# Patient Record
Sex: FEMALE | Race: ASIAN | NOT HISPANIC OR LATINO | Employment: FULL TIME | URBAN - METROPOLITAN AREA
[De-identification: names, ages, dates, MRNs, and addresses within clinical notes are randomized per-mention and may not be internally consistent; named-entity substitution may affect disease eponyms.]

---

## 2017-03-31 ENCOUNTER — APPOINTMENT (OUTPATIENT)
Dept: ULTRASOUND IMAGING | Facility: CLINIC | Age: 37
End: 2017-03-31
Attending: EMERGENCY MEDICINE
Payer: COMMERCIAL

## 2017-03-31 ENCOUNTER — HOSPITAL ENCOUNTER (OUTPATIENT)
Facility: CLINIC | Age: 37
Discharge: HOME | End: 2017-04-01
Attending: EMERGENCY MEDICINE | Admitting: OBSTETRICS & GYNECOLOGY
Payer: COMMERCIAL

## 2017-03-31 ENCOUNTER — ANESTHESIA (OUTPATIENT)
Dept: SURGERY | Facility: CLINIC | Age: 37
End: 2017-03-31
Payer: COMMERCIAL

## 2017-03-31 ENCOUNTER — APPOINTMENT (OUTPATIENT)
Dept: ULTRASOUND IMAGING | Facility: CLINIC | Age: 37
End: 2017-03-31
Attending: OBSTETRICS & GYNECOLOGY
Payer: COMMERCIAL

## 2017-03-31 ENCOUNTER — ANESTHESIA EVENT (OUTPATIENT)
Dept: SURGERY | Facility: CLINIC | Age: 37
End: 2017-03-31
Payer: COMMERCIAL

## 2017-03-31 DIAGNOSIS — Z98.890 S/P DILATION AND CURETTAGE: Primary | ICD-10-CM

## 2017-03-31 LAB
ABO + RH BLD: NORMAL
ABO + RH BLD: NORMAL
ANISOCYTOSIS BLD QL SMEAR: SLIGHT
BASOPHILS # BLD AUTO: 0.1 10E9/L (ref 0–0.2)
BASOPHILS NFR BLD AUTO: 0.6 %
BLD GP AB SCN SERPL QL: NORMAL
BLD PROD TYP BPU: NORMAL
BLD PROD TYP BPU: NORMAL
BLD UNIT ID BPU: 0
BLD UNIT ID BPU: 0
BLOOD BANK CMNT PATIENT-IMP: NORMAL
BLOOD PRODUCT CODE: NORMAL
BLOOD PRODUCT CODE: NORMAL
BPU ID: NORMAL
BPU ID: NORMAL
DIFFERENTIAL METHOD BLD: ABNORMAL
EOSINOPHIL # BLD AUTO: 0.5 10E9/L (ref 0–0.7)
EOSINOPHIL NFR BLD AUTO: 5.1 %
ERYTHROCYTE [DISTWIDTH] IN BLOOD BY AUTOMATED COUNT: 17.5 % (ref 10–15)
HCG SERPL QL: NEGATIVE
HCT VFR BLD AUTO: 29.8 % (ref 35–47)
HGB BLD-MCNC: 10.2 G/DL (ref 11.7–15.7)
HGB BLD-MCNC: 6.9 G/DL (ref 11.7–15.7)
HGB BLD-MCNC: 9.7 G/DL (ref 11.7–15.7)
IMM GRANULOCYTES # BLD: 0 10E9/L (ref 0–0.4)
IMM GRANULOCYTES NFR BLD: 0.4 %
LYMPHOCYTES # BLD AUTO: 1.6 10E9/L (ref 0.8–5.3)
LYMPHOCYTES NFR BLD AUTO: 17.2 %
MCH RBC QN AUTO: 18.7 PG (ref 26.5–33)
MCHC RBC AUTO-ENTMCNC: 32.6 G/DL (ref 31.5–36.5)
MCV RBC AUTO: 57 FL (ref 78–100)
MICROCYTES BLD QL SMEAR: PRESENT
MONOCYTES # BLD AUTO: 0.5 10E9/L (ref 0–1.3)
MONOCYTES NFR BLD AUTO: 5.7 %
NEUTROPHILS # BLD AUTO: 6.6 10E9/L (ref 1.6–8.3)
NEUTROPHILS NFR BLD AUTO: 71 %
NRBC # BLD AUTO: 0 10*3/UL
NRBC BLD AUTO-RTO: 0 /100
NUM BPU REQUESTED: 2
PLATELET # BLD AUTO: 224 10E9/L (ref 150–450)
PLATELET # BLD EST: NORMAL 10*3/UL
RBC # BLD AUTO: 5.19 10E12/L (ref 3.8–5.2)
SPECIMEN EXP DATE BLD: NORMAL
TARGETS BLD QL SMEAR: SLIGHT
TRANSFUSION STATUS PATIENT QL: NORMAL
WBC # BLD AUTO: 9.2 10E9/L (ref 4–11)

## 2017-03-31 PROCEDURE — 00000159 ZZHCL STATISTIC H-SEND OUTS PREP: Performed by: OBSTETRICS & GYNECOLOGY

## 2017-03-31 PROCEDURE — 37000009 ZZH ANESTHESIA TECHNICAL FEE, EACH ADDTL 15 MIN: Performed by: OBSTETRICS & GYNECOLOGY

## 2017-03-31 PROCEDURE — 25000128 H RX IP 250 OP 636: Performed by: EMERGENCY MEDICINE

## 2017-03-31 PROCEDURE — 40000935 ZZH STATISTIC OUTPATIENT (NON-OBS) EVE

## 2017-03-31 PROCEDURE — 36000052 ZZH SURGERY LEVEL 2 EA 15 ADDTL MIN: Performed by: OBSTETRICS & GYNECOLOGY

## 2017-03-31 PROCEDURE — 27210794 ZZH OR GENERAL SUPPLY STERILE: Performed by: OBSTETRICS & GYNECOLOGY

## 2017-03-31 PROCEDURE — 36000050 ZZH SURGERY LEVEL 2 1ST 30 MIN: Performed by: OBSTETRICS & GYNECOLOGY

## 2017-03-31 PROCEDURE — 85018 HEMOGLOBIN: CPT | Performed by: ANESTHESIOLOGY

## 2017-03-31 PROCEDURE — 25000132 ZZH RX MED GY IP 250 OP 250 PS 637: Performed by: OBSTETRICS & GYNECOLOGY

## 2017-03-31 PROCEDURE — 36430 TRANSFUSION BLD/BLD COMPNT: CPT

## 2017-03-31 PROCEDURE — 37000008 ZZH ANESTHESIA TECHNICAL FEE, 1ST 30 MIN: Performed by: OBSTETRICS & GYNECOLOGY

## 2017-03-31 PROCEDURE — 40000306 ZZH STATISTIC PRE PROC ASSESS II: Performed by: OBSTETRICS & GYNECOLOGY

## 2017-03-31 PROCEDURE — 88305 TISSUE EXAM BY PATHOLOGIST: CPT | Mod: 26 | Performed by: OBSTETRICS & GYNECOLOGY

## 2017-03-31 PROCEDURE — 36415 COLL VENOUS BLD VENIPUNCTURE: CPT | Performed by: OBSTETRICS & GYNECOLOGY

## 2017-03-31 PROCEDURE — 71000012 ZZH RECOVERY PHASE 1 LEVEL 1 FIRST HR: Performed by: OBSTETRICS & GYNECOLOGY

## 2017-03-31 PROCEDURE — 99285 EMERGENCY DEPT VISIT HI MDM: CPT | Mod: 25

## 2017-03-31 PROCEDURE — 84703 CHORIONIC GONADOTROPIN ASSAY: CPT | Performed by: EMERGENCY MEDICINE

## 2017-03-31 PROCEDURE — 86901 BLOOD TYPING SEROLOGIC RH(D): CPT | Performed by: OBSTETRICS & GYNECOLOGY

## 2017-03-31 PROCEDURE — 85025 COMPLETE CBC W/AUTO DIFF WBC: CPT | Performed by: EMERGENCY MEDICINE

## 2017-03-31 PROCEDURE — 86900 BLOOD TYPING SEROLOGIC ABO: CPT | Performed by: OBSTETRICS & GYNECOLOGY

## 2017-03-31 PROCEDURE — 96374 THER/PROPH/DIAG INJ IV PUSH: CPT

## 2017-03-31 PROCEDURE — P9016 RBC LEUKOCYTES REDUCED: HCPCS | Performed by: OBSTETRICS & GYNECOLOGY

## 2017-03-31 PROCEDURE — 86923 COMPATIBILITY TEST ELECTRIC: CPT | Performed by: OBSTETRICS & GYNECOLOGY

## 2017-03-31 PROCEDURE — 25800025 ZZH RX 258: Performed by: ANESTHESIOLOGY

## 2017-03-31 PROCEDURE — 96361 HYDRATE IV INFUSION ADD-ON: CPT

## 2017-03-31 PROCEDURE — 85018 HEMOGLOBIN: CPT | Performed by: OBSTETRICS & GYNECOLOGY

## 2017-03-31 PROCEDURE — 40000864 US INTRAOPERATIVE: Mod: TC

## 2017-03-31 PROCEDURE — 96375 TX/PRO/DX INJ NEW DRUG ADDON: CPT

## 2017-03-31 PROCEDURE — 40000936 ZZH STATISTIC OUTPATIENT (NON-OBS) NIGHT

## 2017-03-31 PROCEDURE — 25000125 ZZHC RX 250: Performed by: NURSE ANESTHETIST, CERTIFIED REGISTERED

## 2017-03-31 PROCEDURE — 25000566 ZZH SEVOFLURANE, EA 15 MIN: Performed by: OBSTETRICS & GYNECOLOGY

## 2017-03-31 PROCEDURE — 25000128 H RX IP 250 OP 636: Performed by: NURSE ANESTHETIST, CERTIFIED REGISTERED

## 2017-03-31 PROCEDURE — 76830 TRANSVAGINAL US NON-OB: CPT

## 2017-03-31 PROCEDURE — 25000125 ZZHC RX 250: Performed by: OBSTETRICS & GYNECOLOGY

## 2017-03-31 PROCEDURE — 25000132 ZZH RX MED GY IP 250 OP 250 PS 637: Performed by: NURSE ANESTHETIST, CERTIFIED REGISTERED

## 2017-03-31 PROCEDURE — 86850 RBC ANTIBODY SCREEN: CPT | Performed by: OBSTETRICS & GYNECOLOGY

## 2017-03-31 PROCEDURE — 88305 TISSUE EXAM BY PATHOLOGIST: CPT | Performed by: OBSTETRICS & GYNECOLOGY

## 2017-03-31 PROCEDURE — 25000132 ZZH RX MED GY IP 250 OP 250 PS 637: Performed by: ANESTHESIOLOGY

## 2017-03-31 PROCEDURE — 96409 CHEMO IV PUSH SNGL DRUG: CPT

## 2017-03-31 RX ORDER — HYDROMORPHONE HYDROCHLORIDE 1 MG/ML
.3-.5 INJECTION, SOLUTION INTRAMUSCULAR; INTRAVENOUS; SUBCUTANEOUS EVERY 10 MIN PRN
Status: DISCONTINUED | OUTPATIENT
Start: 2017-03-31 | End: 2017-03-31 | Stop reason: HOSPADM

## 2017-03-31 RX ORDER — PHENAZOPYRIDINE HYDROCHLORIDE 200 MG/1
200 TABLET, FILM COATED ORAL ONCE
Status: DISCONTINUED | OUTPATIENT
Start: 2017-03-31 | End: 2017-04-01 | Stop reason: HOSPADM

## 2017-03-31 RX ORDER — NALOXONE HYDROCHLORIDE 0.4 MG/ML
.1-.4 INJECTION, SOLUTION INTRAMUSCULAR; INTRAVENOUS; SUBCUTANEOUS
Status: DISCONTINUED | OUTPATIENT
Start: 2017-03-31 | End: 2017-03-31 | Stop reason: HOSPADM

## 2017-03-31 RX ORDER — KETOROLAC TROMETHAMINE 30 MG/ML
30 INJECTION, SOLUTION INTRAMUSCULAR; INTRAVENOUS ONCE
Status: DISCONTINUED | OUTPATIENT
Start: 2017-03-31 | End: 2017-03-31

## 2017-03-31 RX ORDER — LABETALOL HYDROCHLORIDE 5 MG/ML
10 INJECTION, SOLUTION INTRAVENOUS EVERY 5 MIN PRN
Status: DISCONTINUED | OUTPATIENT
Start: 2017-03-31 | End: 2017-03-31 | Stop reason: HOSPADM

## 2017-03-31 RX ORDER — SODIUM CHLORIDE 9 MG/ML
1000 INJECTION, SOLUTION INTRAVENOUS CONTINUOUS
Status: DISCONTINUED | OUTPATIENT
Start: 2017-03-31 | End: 2017-03-31

## 2017-03-31 RX ORDER — FENTANYL CITRATE 50 UG/ML
25-50 INJECTION, SOLUTION INTRAMUSCULAR; INTRAVENOUS
Status: DISCONTINUED | OUTPATIENT
Start: 2017-03-31 | End: 2017-03-31 | Stop reason: HOSPADM

## 2017-03-31 RX ORDER — OXYTOCIN 10 [USP'U]/ML
INJECTION, SOLUTION INTRAMUSCULAR; INTRAVENOUS PRN
Status: DISCONTINUED | OUTPATIENT
Start: 2017-03-31 | End: 2017-03-31

## 2017-03-31 RX ORDER — MEPERIDINE HYDROCHLORIDE 25 MG/ML
12.5 INJECTION INTRAMUSCULAR; INTRAVENOUS; SUBCUTANEOUS
Status: DISCONTINUED | OUTPATIENT
Start: 2017-03-31 | End: 2017-03-31 | Stop reason: HOSPADM

## 2017-03-31 RX ORDER — SODIUM CHLORIDE, SODIUM LACTATE, POTASSIUM CHLORIDE, CALCIUM CHLORIDE 600; 310; 30; 20 MG/100ML; MG/100ML; MG/100ML; MG/100ML
INJECTION, SOLUTION INTRAVENOUS CONTINUOUS
Status: DISCONTINUED | OUTPATIENT
Start: 2017-03-31 | End: 2017-03-31 | Stop reason: HOSPADM

## 2017-03-31 RX ORDER — ONDANSETRON 4 MG/1
4 TABLET, ORALLY DISINTEGRATING ORAL EVERY 30 MIN PRN
Status: DISCONTINUED | OUTPATIENT
Start: 2017-03-31 | End: 2017-03-31 | Stop reason: HOSPADM

## 2017-03-31 RX ORDER — ONDANSETRON 2 MG/ML
4 INJECTION INTRAMUSCULAR; INTRAVENOUS EVERY 30 MIN PRN
Status: DISCONTINUED | OUTPATIENT
Start: 2017-03-31 | End: 2017-03-31 | Stop reason: HOSPADM

## 2017-03-31 RX ORDER — LIDOCAINE 40 MG/G
CREAM TOPICAL
Status: DISCONTINUED | OUTPATIENT
Start: 2017-03-31 | End: 2017-03-31 | Stop reason: HOSPADM

## 2017-03-31 RX ORDER — ONDANSETRON 2 MG/ML
4 INJECTION INTRAMUSCULAR; INTRAVENOUS EVERY 6 HOURS PRN
Status: DISCONTINUED | OUTPATIENT
Start: 2017-03-31 | End: 2017-04-01 | Stop reason: HOSPADM

## 2017-03-31 RX ORDER — NALOXONE HYDROCHLORIDE 0.4 MG/ML
.1-.4 INJECTION, SOLUTION INTRAMUSCULAR; INTRAVENOUS; SUBCUTANEOUS
Status: DISCONTINUED | OUTPATIENT
Start: 2017-03-31 | End: 2017-04-01 | Stop reason: HOSPADM

## 2017-03-31 RX ORDER — ACETAMINOPHEN 500 MG
1000 TABLET ORAL ONCE
Status: COMPLETED | OUTPATIENT
Start: 2017-03-31 | End: 2017-03-31

## 2017-03-31 RX ORDER — DOXYCYCLINE 100 MG/10ML
100 INJECTION, POWDER, LYOPHILIZED, FOR SOLUTION INTRAVENOUS
Status: COMPLETED | OUTPATIENT
Start: 2017-03-31 | End: 2017-03-31

## 2017-03-31 RX ORDER — METHYLERGONOVINE MALEATE 0.2 MG/1
TABLET ORAL PRN
Status: DISCONTINUED | OUTPATIENT
Start: 2017-03-31 | End: 2017-03-31

## 2017-03-31 RX ORDER — ACETAMINOPHEN 325 MG/1
650 TABLET ORAL EVERY 6 HOURS PRN
Status: DISCONTINUED | OUTPATIENT
Start: 2017-03-31 | End: 2017-04-01 | Stop reason: HOSPADM

## 2017-03-31 RX ORDER — GLYCOPYRROLATE 0.2 MG/ML
INJECTION, SOLUTION INTRAMUSCULAR; INTRAVENOUS PRN
Status: DISCONTINUED | OUTPATIENT
Start: 2017-03-31 | End: 2017-03-31

## 2017-03-31 RX ORDER — METHYLERGONOVINE MALEATE 0.2 MG/1
200 TABLET ORAL 3 TIMES DAILY
Status: DISCONTINUED | OUTPATIENT
Start: 2017-03-31 | End: 2017-04-01 | Stop reason: HOSPADM

## 2017-03-31 RX ORDER — IBUPROFEN 600 MG/1
600 TABLET, FILM COATED ORAL EVERY 6 HOURS PRN
Status: DISCONTINUED | OUTPATIENT
Start: 2017-03-31 | End: 2017-04-01 | Stop reason: HOSPADM

## 2017-03-31 RX ORDER — KETOROLAC TROMETHAMINE 15 MG/ML
15 INJECTION, SOLUTION INTRAMUSCULAR; INTRAVENOUS ONCE
Status: COMPLETED | OUTPATIENT
Start: 2017-03-31 | End: 2017-03-31

## 2017-03-31 RX ORDER — FENTANYL CITRATE 50 UG/ML
INJECTION, SOLUTION INTRAMUSCULAR; INTRAVENOUS PRN
Status: DISCONTINUED | OUTPATIENT
Start: 2017-03-31 | End: 2017-03-31

## 2017-03-31 RX ORDER — LIDOCAINE HYDROCHLORIDE 10 MG/ML
INJECTION, SOLUTION INFILTRATION; PERINEURAL PRN
Status: DISCONTINUED | OUTPATIENT
Start: 2017-03-31 | End: 2017-03-31

## 2017-03-31 RX ORDER — PROPOFOL 10 MG/ML
INJECTION, EMULSION INTRAVENOUS PRN
Status: DISCONTINUED | OUTPATIENT
Start: 2017-03-31 | End: 2017-03-31

## 2017-03-31 RX ORDER — EPHEDRINE SULFATE 50 MG/ML
INJECTION, SOLUTION INTRAMUSCULAR; INTRAVENOUS; SUBCUTANEOUS PRN
Status: DISCONTINUED | OUTPATIENT
Start: 2017-03-31 | End: 2017-03-31

## 2017-03-31 RX ORDER — ONDANSETRON 4 MG/1
4 TABLET, ORALLY DISINTEGRATING ORAL EVERY 6 HOURS PRN
Status: DISCONTINUED | OUTPATIENT
Start: 2017-03-31 | End: 2017-04-01 | Stop reason: HOSPADM

## 2017-03-31 RX ADMIN — Medication 5 MG: at 14:53

## 2017-03-31 RX ADMIN — Medication 5 MG: at 14:45

## 2017-03-31 RX ADMIN — PROPOFOL 150 MG: 10 INJECTION, EMULSION INTRAVENOUS at 14:29

## 2017-03-31 RX ADMIN — DOXYCYCLINE HYCLATE 100 MG: 100 INJECTION, POWDER, LYOPHILIZED, FOR SOLUTION INTRAVENOUS at 14:25

## 2017-03-31 RX ADMIN — MIDAZOLAM HYDROCHLORIDE 1 MG: 1 INJECTION, SOLUTION INTRAMUSCULAR; INTRAVENOUS at 14:25

## 2017-03-31 RX ADMIN — CONJUGATED ESTROGENS 25 MG: 25 INJECTION, POWDER, LYOPHILIZED, FOR SOLUTION INTRAMUSCULAR; INTRAVENOUS at 12:20

## 2017-03-31 RX ADMIN — PHENYLEPHRINE HYDROCHLORIDE 100 MCG: 10 INJECTION, SOLUTION INTRAMUSCULAR; INTRAVENOUS; SUBCUTANEOUS at 14:53

## 2017-03-31 RX ADMIN — SODIUM CHLORIDE, POTASSIUM CHLORIDE, SODIUM LACTATE AND CALCIUM CHLORIDE: 600; 310; 30; 20 INJECTION, SOLUTION INTRAVENOUS at 15:14

## 2017-03-31 RX ADMIN — PHENYLEPHRINE HYDROCHLORIDE 150 MCG: 10 INJECTION, SOLUTION INTRAMUSCULAR; INTRAVENOUS; SUBCUTANEOUS at 14:49

## 2017-03-31 RX ADMIN — METHYLERGONOVINE MALEATE 200 MCG: 0.2 TABLET ORAL at 14:51

## 2017-03-31 RX ADMIN — METHYLERGONOVINE MALEATE 200 MCG: 0.2 TABLET ORAL at 20:07

## 2017-03-31 RX ADMIN — SODIUM CHLORIDE, POTASSIUM CHLORIDE, SODIUM LACTATE AND CALCIUM CHLORIDE: 600; 310; 30; 20 INJECTION, SOLUTION INTRAVENOUS at 14:25

## 2017-03-31 RX ADMIN — OXYTOCIN 10 UNITS: 10 INJECTION, SOLUTION INTRAMUSCULAR; INTRAVENOUS at 14:55

## 2017-03-31 RX ADMIN — SODIUM CHLORIDE 1000 ML: 9 INJECTION, SOLUTION INTRAVENOUS at 11:40

## 2017-03-31 RX ADMIN — FENTANYL CITRATE 50 MCG: 50 INJECTION, SOLUTION INTRAMUSCULAR; INTRAVENOUS at 14:43

## 2017-03-31 RX ADMIN — PHENYLEPHRINE HYDROCHLORIDE 50 MCG: 10 INJECTION, SOLUTION INTRAMUSCULAR; INTRAVENOUS; SUBCUTANEOUS at 14:45

## 2017-03-31 RX ADMIN — MIDAZOLAM HYDROCHLORIDE 1 MG: 1 INJECTION, SOLUTION INTRAMUSCULAR; INTRAVENOUS at 15:05

## 2017-03-31 RX ADMIN — GLYCOPYRROLATE 0.2 MG: 0.2 INJECTION, SOLUTION INTRAMUSCULAR; INTRAVENOUS at 14:43

## 2017-03-31 RX ADMIN — LIDOCAINE HYDROCHLORIDE 50 MG: 10 INJECTION, SOLUTION INFILTRATION; PERINEURAL at 14:29

## 2017-03-31 RX ADMIN — PHENYLEPHRINE HYDROCHLORIDE 100 MCG: 10 INJECTION, SOLUTION INTRAMUSCULAR; INTRAVENOUS; SUBCUTANEOUS at 14:57

## 2017-03-31 RX ADMIN — PHENYLEPHRINE HYDROCHLORIDE 100 MCG: 10 INJECTION, SOLUTION INTRAMUSCULAR; INTRAVENOUS; SUBCUTANEOUS at 14:54

## 2017-03-31 RX ADMIN — SUCCINYLCHOLINE CHLORIDE 60 MG: 20 INJECTION, SOLUTION INTRAMUSCULAR; INTRAVENOUS at 14:29

## 2017-03-31 RX ADMIN — PHENYLEPHRINE HYDROCHLORIDE 100 MCG: 10 INJECTION, SOLUTION INTRAMUSCULAR; INTRAVENOUS; SUBCUTANEOUS at 15:05

## 2017-03-31 RX ADMIN — KETOROLAC TROMETHAMINE 15 MG: 15 INJECTION, SOLUTION INTRAMUSCULAR; INTRAVENOUS at 11:39

## 2017-03-31 RX ADMIN — PHENYLEPHRINE HYDROCHLORIDE 100 MCG: 10 INJECTION, SOLUTION INTRAMUSCULAR; INTRAVENOUS; SUBCUTANEOUS at 14:50

## 2017-03-31 RX ADMIN — ACETAMINOPHEN 1000 MG: 500 TABLET, FILM COATED ORAL at 13:25

## 2017-03-31 ASSESSMENT — ENCOUNTER SYMPTOMS: LIGHT-HEADEDNESS: 1

## 2017-03-31 NOTE — H&P
GYN Admission Note    HPI:  Pt is a 37 year old No obstetric history on file. with LMP who presented to the ER c/o heavy vaginal bleeding.  Her symptoms began last night and has increased, passing clots and filling a pad in less than an hour..  She was seen in our office in December and had an ultrasound that showed a missed ab/blighted ovum.  At that time she was not bleeding.  She began having light spotting in January which continued until last night when she began bleeding heavily.    OBHx:      GynHx: No significant    Med Hx: History reviewed. No pertinent past medical history.    Surg Hx:  No past surgical history on file.    Meds:  none    Allergies: No Known Allergies    Soc Hx:   Social History     Social History     Marital status: Single     Spouse name: N/A     Number of children: N/A     Years of education: N/A     Occupational History     Not on file.     Social History Main Topics     Smoking status: Not on file     Smokeless tobacco: Not on file     Alcohol use Not on file     Drug use: Not on file     Sexual activity: Not on file     Other Topics Concern     Not on file     Social History Narrative     No narrative on file       Fam Hx: No family history on file.    PE:    VS:  BP (!) 162/93  Pulse 89  Temp 97.4  F (36.3  C) (Oral)  Resp 16  SpO2 100%  Gen:  A&O, NAD  Lungs:  CTAB  CV:  RRR  Abd:  Soft nontender  Pelvic: clots in vagina, cervix closed    Labs:  QUalitative HCG negative, hgb=9.4    Imaging: uterus filled with clots, debris, c/w retained POC, complex cyst ovary    A/P:  37 year old No obstetric history on file. with presumed retained POC  1. Suction D and C now.  Surgical procedure dicussed, Risk and benefits discussed.  2. Type and screen for blood type  3. Followup ovarian cyst as outpatient    Cary Ascencio  3/31/2017  12:47 PM

## 2017-03-31 NOTE — PROGRESS NOTES
ROOM #226    Living Situation (if not independent, order SW consult): with family  Facility name:    Activity level at baseline: Ind  Activity level on admit: SBA    Is patient a falls risk? Yes  Reason for falls risk: Other- post op  Falls armband on? YES  Within Arm's Reach? Yes   Bed alarm turned on?   Not applicable, calling appropriately  Personal alarm in place and turned on?   Not applicable,    Patient registered to observation; given Patient Bill of Rights; given the opportunity to ask questions about observation status and their plan of care.  Patient has been oriented to the observation room, bathroom and call light is in place.    : Olayinka hampton.  See demographics.

## 2017-03-31 NOTE — PLAN OF CARE
Problem: Discharge Planning  Goal: Discharge Planning (Adult, OB, Behavioral, Peds)  PRIMARY DIAGNOSIS/PROCEDURE: D & C, Acute Blood Loss  OUTPATIENT/OBSERVATION GOALS TO BE MET BEFORE DISCHARGE:     1. Stable vital signs Yes  2. Tolerating diet:Yes,full liquid  3. Pain controlled with oral pain medications:  Denies pain  4. Positive bowel sounds:  Yes  5. Voiding without difficulty:  Yes  6. Able to ambulate:  Yes  7. Provider specific discharge goals met:  No       A&O x 4.  Denies pain.  Received 2 units PRBC.  Recheck hgb at 6.9.  Denies dizziness/lightheadedness.  Up with SBA.  Voiding appropriately.  Scant drainage on peripad.  Tolerating full liquid diet.  VSS on RA.

## 2017-03-31 NOTE — BRIEF OP NOTE
[facility] Brief Operative Note    Pre-operative diagnosis: incomplete AB   Post-operative diagnosis same   Procedure: Procedure(s):  DILATION AND CURETTAGE SUCTION with ultrasound guidance  - Wound Class: II-Clean Contaminated   Surgeon(s): Surgeon(s) and Role:     * Cary Ascencio MD - Assiting     * Lise Stack MD - Primary   Estimated blood loss: 300 mL intraop, 300cc clot and blood in underwear upon arrival to OR    Specimens:   ID Type Source Tests Collected by Time Destination   A : Products of Conception  Products of Conception Placenta/ Fragments/ Fetus from Miscarriage or Termination SURGICAL PATHOLOGY EXAM Cary Ascencio MD 3/31/2017  3:21 PM       Findings: 8wk size uterus, initial return of small white calcific fragments and dark black tissue followed by brisk uterine bleeding. IV pitocin and IM methergine given with resolution. U/S guidance called to OR and cavity still appeared full of tissue/clot. Sharp and suction curettage performed under u/s guidance and slowly several large pieces of white/black necrotic tissue removed and uterine cavity then normal on u/s. Intraop hgb drawn at start of case before EBL was 6.9cc.  Two units PRBC ordered.  IVF:  1000cc LR  UOP 40cc  Anesthesia: [place] Felipe MD

## 2017-03-31 NOTE — ED NOTES
Patient reports heavy vaginal bleeding since yesterday. Patient states that she had a miscarriage in November, and since then has had a light amount of vaginal bleeding. Heavy bleeding, which patient reports is soaking more than one pad an hour, started last night. She reports some pelvic cramping, rates 5/10. ABCDs intact. Skin is pink and dry.

## 2017-03-31 NOTE — ANESTHESIA CARE TRANSFER NOTE
Patient: Mey Kim    Procedure(s):  DILATION AND CURETTAGE SUCTION with ultrasound guidance  - Wound Class: II-Clean Contaminated    Diagnosis: incomplete AB  Diagnosis Additional Information: No value filed.    Anesthesia Type:   General, ETT     Note:  Airway :Blow-by  Patient transferred to:PACU  Comments: Pt transferred to recovery with BBO2, spontaneous respirations, adequate ventilation/oxygenatioin during transfer, report shared.  2PRBC spike per anesthesia in recovery      Vitals: (Last set prior to Anesthesia Care Transfer)    CRNA VITALS  3/31/2017 1505 - 3/31/2017 1550      3/31/2017             Pulse: 86    SpO2: 100 %    Resp Rate (observed): 9                Electronically Signed By: EDIN Camilo CRNA  March 31, 2017  3:50 PM

## 2017-03-31 NOTE — PROGRESS NOTES
2nd unit of PRBC complete.  Was started in OR per report.  VSS.  No transfusion reaction.  Will recheck hgb at 2200.

## 2017-03-31 NOTE — IP AVS SNAPSHOT
[facility] Observation Department    [address]    [facility] [address]    Phone:  [phone]                                       After Visit Summary   3/31/2017    Mey Kim    MRN: [id number]           After Visit Summary Signature Page     I have received my discharge instructions, and my questions have been answered. I have discussed any challenges I see with this plan with the nurse or doctor.    ..........................................................................................................................................  Patient/Patient Representative Signature      ..........................................................................................................................................  Patient Representative Print Name and Relationship to Patient    ..................................................               ................................................  Date                                            Time    ..........................................................................................................................................  Reviewed by Signature/Title    ...................................................              ..............................................  Date                                                            Time

## 2017-03-31 NOTE — ED PROVIDER NOTES
History     Chief Complaint:    Vaginal Bleeding      HPI   Mey Kim is a 37 year old female who presents with vaginal bleeding. The patient had a spontaneous  in 2016 and has had continuous light vaginal bleeding since then. She has not followed with OBGYN. Yesterday she had onset of fluctuating heavy vaginal bleeding with clots, soaking through multiple pads. She has associated abdominal cramping and light headedness. No other vaginal discharge. No urinary symptoms. No recent trauma. She is not currently on birth control and was last using the Nuvaring last July. She was last seen by OBGYN last November.     Allergies:  No known drug allergies.      Medications:    The patient is not currently taking any prescribed medications.      Past Medical History:    History reviewed.  No significant past medical history.      Past Surgical History:    History reviewed. No pertinent past surgical history.     Family History:    History reviewed. No pertinent family history.     Social History:  Marital Status: single  Presents to the ED alone    Review of Systems   Genitourinary: Positive for vaginal bleeding. Negative for vaginal discharge.   Neurological: Positive for light-headedness.   All other systems reviewed and are negative.      Physical Exam   First Vitals:  BP: (!) 140/102  Pulse: 89  Temp: 97.4  F (36.3  C)  Resp: 16  SpO2: 100 %    Physical Exam  Constitutional: Patient is well appearing. No distress.  Head: Atraumatic.  Mouth/Throat: Oropharynx is clear and moist. No oropharyngeal exudate.  Eyes: Conjunctivae and EOM are normal. No scleral icterus.  Neck: Normal range of motion. Neck supple.   Cardiovascular: Normal rate, regular rhythm, normal heart sounds and intact distal pulses.   Pulmonary/Chest: Breath sounds normal. No respiratory distress.  Abdominal: Soft. Bowel sounds are normal. No distension. No tenderness. No rebound or guarding.   Musculoskeletal: Normal range of  motion. No edema or tenderness.   Neurological: Alert and orientated to person, place, and time. No observable focal neuro deficit  Skin: Warm and dry. No rash noted. Not diaphoretic.      Emergency Department Course     Imaging:  US Pelvic Complete w Transvaginal  Preliminary Result  IMPRESSION:  1. Prominently enlarged and heterogeneous endometrium measuring a  maximal thickness of 4 cm. This potentially represents retained  products of conception. A neoplastic etiology remains in the  differential.  2. A complex cystic lesion at the left ovary is identified, measuring  4.9 cm. It has internal lobulated nodularity and some peripheral  nodularity. This is considered an indeterminant complex cystic mass,  including an ovarian neoplasm. Recommend close clinical and imaging  follow-up. Recommend short interval repeat ultrasound in 6-8 weeks.  Hemorrhagic cyst with some contracted thrombus internally is also a  possibility.  3. Small free pelvic fluid.     Radiographic findings were communicated with the patient who voiced understanding of the findings.    Laboratory:  CBC:  WBC 9.2, HGB 9.7 (L),   HCG Qual serum: Negative     Interventions:  Normal Saline 1000 mL IV   Toradol 15 mg IV  Premarin 25 mg IV    Emergency Department Course:  Nursing notes and vitals reviewed.  I performed an exam of the patient as documented above.    The above workup was undertaken.    Today's H & H is in a range normal for patient by Care Everywhere.   (3229) I consulted with Dr. Stack at [facility] regarding the patient.   The patient was taken to the OR by OBGYN to undergo D&C.      Impression & Plan      Medical Decision Making:  reatined products need D&C obgyn consulted and took to or.    All questions and concerns were answered. The patient was recommended to follow up with her primary physician and return with any new or worsening symptoms.      Diagnosis:  Vaginal Bleeding with retained products of conception      Disposition:  Sent to OR with OBGYN.     Ruby Lopez  3/31/2017   [facility] EMERGENCY DEPARTMENT    I, Ruby Lopez, am serving as a scribe on 3/31/2017 at 9:09 AM to personally document services performed by Dr. Cook based on my observations and the provider's statements to me.       Ed Cook MD  03/31/17 1500

## 2017-03-31 NOTE — ANESTHESIA POSTPROCEDURE EVALUATION
Patient: Mey Kim    Procedure(s):  DILATION AND CURETTAGE SUCTION with ultrasound guidance  - Wound Class: II-Clean Contaminated    Diagnosis:incomplete AB  Diagnosis Additional Information: incomplete AB    Anesthesia Type:  General, ETT    Note:  Anesthesia Post Evaluation    Patient location during evaluation: PACU  Patient participation: Able to fully participate in evaluation  Level of consciousness: awake and alert  Pain management: adequate  Airway patency: patent  Cardiovascular status: acceptable  Respiratory status: acceptable  Hydration status: acceptable  PONV: none     Anesthetic complications: None          Last vitals:  Vitals:    03/31/17 1610 03/31/17 1615 03/31/17 1620   BP: 120/58 115/49 124/71   Pulse:      Resp: 16 20 16   Temp:      SpO2: 100% 100% 100%         Electronically Signed By: Agustin Garcia MD  March 31, 2017  4:28 PM

## 2017-03-31 NOTE — ANESTHESIA PREPROCEDURE EVALUATION
Anesthesia Evaluation     . Pt has had prior anesthetic. Type: General    No history of anesthetic complications          ROS/MED HX    ENT/Pulmonary:  - neg pulmonary ROS     Neurologic:  - neg neurologic ROS     Cardiovascular:  - neg cardiovascular ROS       METS/Exercise Tolerance:     Hematologic:  - neg hematologic  ROS       Musculoskeletal:  - neg musculoskeletal ROS       GI/Hepatic:  - neg GI/hepatic ROS       Renal/Genitourinary:     (+) Other Renal/ Genitourinary, missed       Endo:  - neg endo ROS       Psychiatric:  - neg psychiatric ROS       Infectious Disease:  - neg infectious disease ROS       Malignancy:      - no malignancy   Other:    - neg other ROS                 Physical Exam  Normal systems: cardiovascular, pulmonary and dental    Airway   Mallampati: II  TM distance: >3 FB  Neck ROM: full    Dental     Cardiovascular       Pulmonary                     Anesthesia Plan      History & Physical Review  History and physical reviewed and following examination; no interval change.    ASA Status:  1 .    NPO Status:  > 8 hours    Plan for General and ETT with Intravenous and Propofol induction. Maintenance will be Balanced.    PONV prophylaxis:  Ondansetron (or other 5HT-3) and Dexamethasone or Solumedrol       Postoperative Care  Postoperative pain management:  IV analgesics and Oral pain medications.      Consents  Anesthetic plan, risks, benefits and alternatives discussed with:  Patient or representative and Patient..                          .

## 2017-03-31 NOTE — IP AVS SNAPSHOT
MRN:[id number]                      After Visit Summary   3/31/2017    Mey Kim    MRN: [id number]           Thank you!     Thank you for choosing [facility] for your care. Our goal is always to provide you with excellent care. Hearing back from our patients is one way we can continue to improve our services. Please take a few minutes to complete the written survey that you may receive in the mail after you visit. If you would like to speak to someone directly about your visit please contact Patient Relations at [phone]. Thank you!          Patient Information     Date Of Birth          1980        About your hospital stay     You were admitted on:  March 31, 2017 You last received care in the:  [facility] Observation Department    You were discharged on:  April 1, 2017        Reason for your hospital stay       POD#1 s/p emergency vacuum D&C under u/s guidance for incomplete AB (8wk blighted ovum diagnosed in 12/16 with no follow up until heavy bleeding developed and ER visit yesterday).  Received 2 u PRBC with stable hgb.                  Who to Call     For medical emergencies, please call 911.  For non-urgent questions about your medical care, please call your primary care provider or clinic, None  For questions related to your surgery, please call your surgery clinic        Attending Provider     Provider Specialty    Ed Cook MD Emergency Medicine    Lise Stack MD OB/Gyn       Primary Care Provider    Physician No Ref-Primary       No address on file         When to contact your care team       Call if vaginal bleeding > 1 maxi pad soaked per hour or if temp > 101.                  After Care Instructions     Activity       Your activity upon discharge: pelvic rest with nothing in vagina until post op visit. No sex, tampons or douching.            Diet       Follow this diet upon discharge: Regular                  Follow-up  "Appointments     Follow-up and recommended labs and tests        [facility] ob[facility] in 2wks for post op visit and to discuss management for left ovarian cyst seen on hospital u/s in ER.                             Pending Results     Date and Time Order Name Status Description    3/31/2017 1522 Surgical pathology exam In process             Statement of Approval     Ordered          17 0949  I have reviewed and agree with all the recommendations and orders detailed in this document.  EFFECTIVE NOW     Approved and electronically signed by:  Lise Stack MD             Admission Information     Date & Time Provider Department Dept. Phone    3/31/2017 Lise Stack MD [facility] Observation Department [phone]      Your Vitals Were     Blood Pressure Pulse Temperature Respirations Pulse Oximetry       118/42 (BP Location: Right arm) 89 97.7  F (36.5  C) (Oral) 14 100%       MyChart Information     [email or web address] lets you send messages to your doctor, view your test results, renew your prescriptions, schedule appointments and more. To sign up, go to [email or web address] . Click on \"Log in\" on the left side of the screen, which will take you to the Welcome page. Then click on \"Sign up Now\" on the right side of the page.     You will be asked to enter the access code listed below, as well as some personal information. Please follow the directions to create your username and password.     Your access code is: [email or web address]  Expires: 2017  8:33 AM     Your access code will  in 90 days. If you need help or a new code, please call your [facility] clinic or [phone].        Care EveryWhere ID     This is your Care EveryWhere ID. This could be used by other organizations to access your [facility] medical records  [id number]           Review of your medicines      START taking        Dose / Directions    methylergonovine 0.2 MG tablet   Commonly known as:  METHERGINE        Dose:  0.2 " mg   Take 1 tablet (200 mcg) by mouth 3 times daily for 8 doses   Quantity:  8 tablet   Refills:  0            Where to get your medicines      These medications were sent to [facility] [place], [place] - [address] [address]  [address] [facility] [address]     Phone:  [phone]     methylergonovine 0.2 MG tablet                Protect others around you: Learn how to safely use, store and throw away your medicines at www.disposemymeds.org.             Medication List: This is a list of all your medications and when to take them. Check marks below indicate your daily home schedule. Keep this list as a reference.      Medications           Morning Afternoon Evening Bedtime As Needed    methylergonovine 0.2 MG tablet   Commonly known as:  METHERGINE   Take 1 tablet (200 mcg) by mouth 3 times daily for 8 doses   Last time this was given:  200 mcg on 4/1/2017  8:55 AM

## 2017-03-31 NOTE — PROGRESS NOTES
I received sign out from my partner Dr. Ascencio about this pt's course to date. She had blighted ovum 8wk size gestational sac in our office on 12/6/16. She chose expectant management but did not follow up. She never bled heavily or passed tissue. She had light bleeding beginning on and off since January that got heavy last night. She came into the ER today and was found to be filling a pad in < 1 hr with clots, hgb 9.7, HCG negative, u/s with 4cm heterogeneous intracavitary material that likely represents incomplete Ab/POC.  She also has a complex ovarian cyst that we discussed.     I reviewed the diagnosis and emergent indications for procedure with the pt and her daughter in detail including the R/B/alternatives and anticipated post op recovery from vacuum curettage. Possible risk of heavy bleeding requiring blood transfusion discussed. Absolute need for follow up in our office 10-14 days post op discussed.  Pt states she has our office # and will make an appt.  All questions answered and consent form signed by both of us.    MD Matteo

## 2017-04-01 VITALS
HEART RATE: 89 BPM | SYSTOLIC BLOOD PRESSURE: 118 MMHG | DIASTOLIC BLOOD PRESSURE: 42 MMHG | OXYGEN SATURATION: 100 % | TEMPERATURE: 97.7 F | RESPIRATION RATE: 14 BRPM

## 2017-04-01 PROCEDURE — 25000132 ZZH RX MED GY IP 250 OP 250 PS 637: Performed by: OBSTETRICS & GYNECOLOGY

## 2017-04-01 PROCEDURE — 40000934 ZZH STATISTIC OUTPATIENT (NON-OBS) DAY

## 2017-04-01 RX ORDER — DOCUSATE SODIUM 100 MG/1
100 CAPSULE, LIQUID FILLED ORAL 2 TIMES DAILY
Status: DISCONTINUED | OUTPATIENT
Start: 2017-04-01 | End: 2017-04-01 | Stop reason: HOSPADM

## 2017-04-01 RX ORDER — METHYLERGONOVINE MALEATE 0.2 MG/1
0.2 TABLET ORAL 3 TIMES DAILY
Qty: 8 TABLET | Refills: 0 | Status: SHIPPED | OUTPATIENT
Start: 2017-04-01 | End: 2017-04-04

## 2017-04-01 RX ADMIN — METHYLERGONOVINE MALEATE 200 MCG: 0.2 TABLET ORAL at 08:55

## 2017-04-01 RX ADMIN — DOCUSATE SODIUM 100 MG: 100 CAPSULE, LIQUID FILLED ORAL at 08:55

## 2017-04-01 NOTE — PLAN OF CARE
Problem: Discharge Planning  Goal: Discharge Planning (Adult, OB, Behavioral, Peds)  Outcome: Improving  PRIMARY DIAGNOSIS/PROCEDURE: D & C, Acute Blood Loss  OUTPATIENT/OBSERVATION GOALS TO BE MET BEFORE DISCHARGE:      1. Stable vital signs Yes  2. Tolerating diet:Yes  3. Pain controlled with oral pain medications: Denies pain  4. Positive bowel sounds: Yes  5. Voiding without difficulty: Yes  6. Able to ambulate: Yes  7. Provider specific discharge goals met: Yes    Pt alert and oriented x4. Denies any pain. Pt reports minimal bleeding. Pt has met post-surgical goals.

## 2017-04-01 NOTE — OP NOTE
DATE OF PROCEDURE:  03/31/2017      PATIENT IDENTIFICATION:  Mey Kim is a 37-year-old, para 2-0-2-2 female who came into the emergency room this morning with heavy vaginal bleeding on and off since yesterday, passing clots.  She has a history of a blighted ovum and was seen in our office for 1 visit on 12/6/2016.  At that time, an ultrasound showed an 8-week gestational sac with the yolk sac present and no fetal pole.  She elected for expectant management over medical management or surgical management and went home.  However, she never did follow up in our office after that.  Today in the ER, she tells me that she never had heavy bleeding after that miscarriage, but began having light bleeding in January and has had light bleeding since until yesterday at which time her bleeding became heavy overnight passing clots and filling a pad in less than an hour.  In the emergency room, her hemoglobin was 9.7 at 9:00 a.m. and her blood type is O positive.  An ultrasound was performed, which showed a 4 cm endometrial thickness heterogeneous with solid areas.  The right ovary was normal.  The left ovary had a complex 4.9 cm complex cystic lesion with a nodule which could be a hemorrhagic cyst versus trace free pelvic fluid.  The patient was evaluated in the emergency room by my partner, Dr. Ascencio and the decision was made that the patient should undergo an emergent vacuum D&C this afternoon for heavy bleeding, anemia and probable retained products of conception from an incomplete miscarriage.  Dr. Ascencio discussed the procedure with the patient and then signed the patient out to me.  I went to the preoperative holding area, and met the patient and discussed the procedure and the indications for procedure in detail with her including the risks, benefits and alternatives to vacuum curettage.  All of her questions were answered.  We specifically discussed the risk of possible heavy bleeding requiring blood transfusion.   Just after the consent form was signed, the patient became lightheaded and nauseous and had a large emesis.  At that time she was evaluated by anesthesia and taken back to the operating room.  I asked for a stat hemoglobin be drawn prior to starting the case and it was.      PREOPERATIVE DIAGNOSIS:  Incomplete miscarriage.      POSTOPERATIVE DIAGNOSIS:  Incomplete miscarriage.      PROCEDURE:  Vacuum dilation and curettage under ultrasound guidance.      SURGEON:  Lise Stack MD      ASSISTANT:   Cary Ascencio MD      ESTIMATED BLOOD LOSS:  600 mL total, 300 mL was intraoperative blood loss, another 300 mL was clot and blood the patient had in her underwear upon arrival into the operating room.      FINDINGS:  There is an 8 week size uterus and with initial vacuum procedure, there was return of small white calcific fragments and dark black tissue fragments followed by very brisk uterine bleeding, IV Pitocin and intramuscular Methergine were given with improvement.  I called for Radiology for guidance as I felt that that quantity of tissue removed with suction did not nearly represent the 4 cm of thickened uterine cavity seen on previous ultrasound.  Upon arrival of the ultrasound tech and under ultrasound guidance, the cavity still appeared distended and full of tissue and clot.  Under direct visualization I continued to perform a sharp curettage of the entire uterine cavity and suction curettage, then slowly several large pieces of white and black necrotic tissue were removed and the uterine cavity then appeared normal on ultrasound.  Intraoperative hemoglobin drawn at the start of the case before estimated blood loss was back at 6.9, 2 units of packed red blood cells ordered.      IV FLUIDS:  1000 mL lactated Ringer's.      URINE OUTPUT:  40 mL.      ANESTHESIA:  General endotracheal.      DESCRIPTION OF PROCEDURE:  After informed consent was obtained, the patient was taken to the operating room where general  endotracheal anesthetic was placed, she was positioned in the dorsal lithotomy position with the Yellofin stirrups.  Underwear and pad were removed which were saturated with blood and approximately 300 mL of clot.  The patient was prepped and draped in the usual sterile fashion.  I performed a timeout, an exam under anesthesia was then performed.  The patient was receiving IV doxycycline.  Speculum was placed into the vagina to visualize the cervix.  The anterior lip was grasped with a single-tooth tenaculum.  The cervix was then serially dilated to a size 8 with Hegar dilators.  A size 8 curved suction curet was placed through the cervix into the uterine cavity.  Two passes were performed with return of small fragments of hard white fragments of tissue and black necrotic-appearing tissue.  After this, there was very brisk uterine bleeding.  Intravenous Pitocin was administered as well as intramuscular Methergine.  I performed a sharp curettage of the entire uterine cavity and felt that the tissue that had been removed did not represent 4 cm of endometrial thickness, which was seen on previous ultrasound.  Therefore, I called for Radiology to have ultrasound come to the operating room, which they did and intraoperative ultrasound did in fact show that the cavity was still full of tissue and clot.  Therefore, under direct ultrasound visualization, a sharp curettage was performed of the entire uterine lining and I called for my partner, Dr. Ascencio to come in for an intraoperative consultation with her.  The cervix was further dilated to a size 10 Hegar dilator and the size 10 curved suction curet was then used and scant tissue was removed.  The uterine bleeding at this point was minimal.  Dr. Ascencio then gowned and gloved and performed a sharp curettage under ultrasound guidance and felt there was some tissue in the left cornua, which she was able to remove with 2 passes of the suction curet.  Intraoperative ultrasound  then showed that the uterine cavity appeared normal.  At this point, the hemoglobin was drawn at the start of the case came back at 6.9, and I gave an order to turn her type and screen into a type and cross for 2 units and to transfuse them.  At this point, the procedure was terminated.  The tenaculum was removed from the anterior lip of the cervix.  The tenaculum sites were noted to be hemostatic.  Uterine bleeding was scant.  Speculum was removed from the vagina.  There were no complications.  Sponge, needle and instrument counts were reported to me as correct at the completion of the case.  The patient was extubated and transferred to the recovery room in satisfactory condition where she will get her 2 units of packed red blood cells.         ORIN CLAY MD             D: 2017 15:52   T: 2017 21:54   MT: [id number]      Name:     OMAYRA ECHEVARRIA   MRN:      [id number]        Account:        [id number]   :      1980           Procedure Date: 2017      Document: [id number]       cc: Orin Clay MD

## 2017-04-01 NOTE — PROGRESS NOTES
Gynecology Service Post-operative Progress Note    S:  Cramping pain has been under good control with ibuprofen and tylenol which she has at home. Vaginal bleeding is light to scant.  Ambulating in hallways. Voiding spontaneously, Passing flatus, no BM yet.  Tolerating regular diet, no nausea or vomiting.  Plans discharge to home today.  Findings at surgery yesterday reviewed in detail. Discussed presence of complex left ovarian cyst seen on u/s that will need outpt follow up in our office. Questions answered. Pt denies s/sx of anemia.    O:  Vitals:    03/31/17 2038 04/01/17 0014 04/01/17 0436 04/01/17 0817   BP: 138/88 139/65 109/53 118/42   BP Location:   Right arm Right arm   Pulse:       Resp: 16 20 16 14   Temp: 97.6  F (36.4  C) 97.6  F (36.4  C) 97.8  F (36.6  C) 97.7  F (36.5  C)   TempSrc: Oral Oral Oral Oral   SpO2: 100% 99% 100% 100%     Gen - NAD  CV - RRR  Pulm - CTA bilaterally, no wheezes or crackles  Abd - soft, nontender, nondistended  Ext -  no edema, nontender      Labs:  Hemoglobin   Date Value Ref Range Status   03/31/2017 10.2 (L) 11.7 - 15.7 g/dL Final   03/31/2017 6.9 (LL) 11.7 - 15.7 g/dL Final     Comment:     This result has been called to HELDER TEJEDA(OR) by Sourav Ford on 03 31 2017 at   1512,   and has been read back.     03/31/2017 9.7 (L) 11.7 - 15.7 g/dL Final       A/P:  Mey Kim is a 37 year old who is POD#1 s/p emergency vacuum curettage under u/s guidance for incomplete AB (dx with 8wk blighted ovum in 12/16 with no follow up until heavy bleeding and ER visit yesterday). Path pending,  - Pain: continue oral meds as tolerated. Ok for ibuprofen and tylenol at home.   - Diet: continue regular diet as tolerated  - : voiding spontaneoulsy  - GI: normal bowel function, started stool softener per pt request this morning,  - Heme: Hgb stable after 2 u PRBC transfusion yesterday (ER hgb 9.7, preop hgb 6.9 and EBL was additional 300cc) for acute blood loss anemia. Erx for  methergine 0.2mg TID for 2 more days.  - Disposition: plan discharge to home today. RTC in 2 wks for post op visit and to discuss complex left ovarian cyst. Pt has our office # and states she will call to make follow up appt. Discussed need for contraception and she will abstain or use condoms until her visit.    Lise Stack MD  [facility]  [phone]

## 2017-04-01 NOTE — PLAN OF CARE
Problem: Discharge Planning  Goal: Discharge Planning (Adult, OB, Behavioral, Peds)  Outcome: Improving  PRIMARY DIAGNOSIS/PROCEDURE: D & C, Acute Blood Loss  OUTPATIENT/OBSERVATION GOALS TO BE MET BEFORE DISCHARGE:      1. Stable vital signs Yes  2. Tolerating diet:Yes  3. Pain controlled with oral pain medications: Denies pain  4. Positive bowel sounds: Yes  5. Voiding without difficulty: Yes  6. Able to ambulate: Yes  7. Provider specific discharge goals met: Yes

## 2017-04-01 NOTE — PHARMACY-ADMISSION MEDICATION HISTORY
Admission medication history interview status for this patient is complete. See [facility] admission navigator for allergy information, prior to admission medications and immunization status.     Medication history interview source(s):Patient  Medication history resources (including written lists, pill bottles, clinic record):[facility] list  Primary pharmacy:    Changes made to PTA medication list:  Added: none  Deleted: none  Changed: none    Actions taken by pharmacist (provider contacted, etc):None     Additional medication history information:None    Medication reconciliation/reorder completed by provider prior to medication history? No    For patients on insulin therapy: NO   Lantus/levemir/NPH/Mix 70/30 dose: _____ in AM/PM or twice daily   Sliding scale Novolog Y/N   If Yes, do you have a baseline novolog pre-meal dose: ______units with meals   Patients eat three meals a day: Y/N   Any Barriers to therapy: cost of medications/comfortable with giving injections (if applicable)/ comfortable and confident with current diabetes regimen     Prior to Admission medications    Not on File

## 2017-04-01 NOTE — PLAN OF CARE
Problem: Discharge Planning  Goal: Discharge Planning (Adult, OB, Behavioral, Peds)  Outcome: Improving  PRIMARY DIAGNOSIS/PROCEDURE: D & C, Acute Blood Loss  OUTPATIENT/OBSERVATION GOALS TO BE MET BEFORE DISCHARGE:      1. Stable vital signs Yes  2. Tolerating diet:Yes  3. Pain controlled with oral pain medications: Denies pain  4. Positive bowel sounds: Yes  5. Voiding without difficulty: Yes  6. Able to ambulate: Yes  7. Provider specific discharge goals met: Yes  Alert and oriented. Family at bedside. Denies pain. Scant vaginal bleeding

## 2017-04-03 LAB — COPATH REPORT: NORMAL

## 2018-06-02 ENCOUNTER — APPOINTMENT (OUTPATIENT)
Dept: MRI IMAGING | Facility: CLINIC | Age: 38
End: 2018-06-02
Attending: EMERGENCY MEDICINE
Payer: COMMERCIAL

## 2018-06-02 ENCOUNTER — APPOINTMENT (OUTPATIENT)
Dept: CT IMAGING | Facility: CLINIC | Age: 38
DRG: 065 | End: 2018-06-02
Attending: INTERNAL MEDICINE
Payer: COMMERCIAL

## 2018-06-02 ENCOUNTER — HOSPITAL ENCOUNTER (INPATIENT)
Facility: CLINIC | Age: 38
LOS: 2 days | Discharge: HOME | DRG: 065 | End: 2018-06-04
Attending: INTERNAL MEDICINE | Admitting: INTERNAL MEDICINE
Payer: COMMERCIAL

## 2018-06-02 ENCOUNTER — HOSPITAL ENCOUNTER (EMERGENCY)
Facility: CLINIC | Age: 38
Discharge: ANOTHER HOSPITAL | End: 2018-06-02
Attending: EMERGENCY MEDICINE | Admitting: EMERGENCY MEDICINE
Payer: COMMERCIAL

## 2018-06-02 VITALS
TEMPERATURE: 98.7 F | WEIGHT: 120 LBS | HEIGHT: 60 IN | RESPIRATION RATE: 18 BRPM | HEART RATE: 77 BPM | BODY MASS INDEX: 23.56 KG/M2 | SYSTOLIC BLOOD PRESSURE: 178 MMHG | DIASTOLIC BLOOD PRESSURE: 96 MMHG | OXYGEN SATURATION: 100 %

## 2018-06-02 DIAGNOSIS — I63.9 ACUTE ISCHEMIC STROKE (H): ICD-10-CM

## 2018-06-02 DIAGNOSIS — I10 BENIGN ESSENTIAL HYPERTENSION: ICD-10-CM

## 2018-06-02 DIAGNOSIS — I10 HYPERTENSION, UNSPECIFIED TYPE: ICD-10-CM

## 2018-06-02 DIAGNOSIS — I63.81 LACUNAR INFARCT, ACUTE (H): ICD-10-CM

## 2018-06-02 DIAGNOSIS — Z98.890 S/P DILATION AND CURETTAGE: Primary | ICD-10-CM

## 2018-06-02 LAB
ANION GAP SERPL CALCULATED.3IONS-SCNC: 8 MMOL/L (ref 3–14)
ANISOCYTOSIS BLD QL SMEAR: SLIGHT
APTT PPP: 30 SEC (ref 22–37)
B-HCG SERPL-ACNC: <1 IU/L (ref 0–5)
BASOPHILS # BLD AUTO: 0.1 10E9/L (ref 0–0.2)
BASOPHILS NFR BLD AUTO: 1.1 %
BUN SERPL-MCNC: 10 MG/DL (ref 7–30)
CALCIUM SERPL-MCNC: 8.1 MG/DL (ref 8.5–10.1)
CHLORIDE SERPL-SCNC: 109 MMOL/L (ref 94–109)
CHOLEST SERPL-MCNC: 162 MG/DL
CO2 SERPL-SCNC: 26 MMOL/L (ref 20–32)
CREAT SERPL-MCNC: 0.79 MG/DL (ref 0.52–1.04)
DIFFERENTIAL METHOD BLD: ABNORMAL
EOSINOPHIL # BLD AUTO: 1.2 10E9/L (ref 0–0.7)
EOSINOPHIL NFR BLD AUTO: 15.8 %
ERYTHROCYTE [DISTWIDTH] IN BLOOD BY AUTOMATED COUNT: 17.2 % (ref 10–15)
ERYTHROCYTE [SEDIMENTATION RATE] IN BLOOD BY WESTERGREN METHOD: 7 MM/H (ref 0–20)
ERYTHROCYTE [SEDIMENTATION RATE] IN BLOOD BY WESTERGREN METHOD: 7 MM/H (ref 0–20)
GFR SERPL CREATININE-BSD FRML MDRD: 82 ML/MIN/1.7M2
GLUCOSE SERPL-MCNC: 98 MG/DL (ref 70–99)
HBA1C MFR BLD: 4.3 % (ref 0–5.6)
HCT VFR BLD AUTO: 33.5 % (ref 35–47)
HDLC SERPL-MCNC: 58 MG/DL
HGB BLD-MCNC: 11.2 G/DL (ref 11.7–15.7)
IMM GRANULOCYTES # BLD: 0 10E9/L (ref 0–0.4)
IMM GRANULOCYTES NFR BLD: 0.3 %
INR PPP: 1.08 (ref 0.86–1.14)
LDLC SERPL CALC-MCNC: 84 MG/DL
LYMPHOCYTES # BLD AUTO: 1.1 10E9/L (ref 0.8–5.3)
LYMPHOCYTES NFR BLD AUTO: 14.3 %
MCH RBC QN AUTO: 19.4 PG (ref 26.5–33)
MCHC RBC AUTO-ENTMCNC: 33.4 G/DL (ref 31.5–36.5)
MCV RBC AUTO: 58 FL (ref 78–100)
MONOCYTES # BLD AUTO: 0.4 10E9/L (ref 0–1.3)
MONOCYTES NFR BLD AUTO: 5.2 %
NEUTROPHILS # BLD AUTO: 4.8 10E9/L (ref 1.6–8.3)
NEUTROPHILS NFR BLD AUTO: 63.3 %
NONHDLC SERPL-MCNC: 104 MG/DL
NRBC # BLD AUTO: 0 10*3/UL
NRBC BLD AUTO-RTO: 0 /100
PLATELET # BLD AUTO: 199 10E9/L (ref 150–450)
PLATELET # BLD EST: ABNORMAL 10*3/UL
POTASSIUM SERPL-SCNC: 3.5 MMOL/L (ref 3.4–5.3)
RBC # BLD AUTO: 5.77 10E12/L (ref 3.8–5.2)
SODIUM SERPL-SCNC: 143 MMOL/L (ref 133–144)
TARGETS BLD QL SMEAR: SLIGHT
TRIGL SERPL-MCNC: 101 MG/DL
TROPONIN I SERPL-MCNC: 0.02 UG/L (ref 0–0.04)
WBC # BLD AUTO: 7.6 10E9/L (ref 4–11)

## 2018-06-02 PROCEDURE — 83036 HEMOGLOBIN GLYCOSYLATED A1C: CPT | Performed by: PSYCHIATRY & NEUROLOGY

## 2018-06-02 PROCEDURE — 70553 MRI BRAIN STEM W/O & W/DYE: CPT

## 2018-06-02 PROCEDURE — 84484 ASSAY OF TROPONIN QUANT: CPT | Performed by: PSYCHIATRY & NEUROLOGY

## 2018-06-02 PROCEDURE — 93005 ELECTROCARDIOGRAM TRACING: CPT

## 2018-06-02 PROCEDURE — 00000401 ZZHCL STATISTIC THROMBIN TIME NC: Performed by: PSYCHIATRY & NEUROLOGY

## 2018-06-02 PROCEDURE — 85652 RBC SED RATE AUTOMATED: CPT | Performed by: EMERGENCY MEDICINE

## 2018-06-02 PROCEDURE — 85652 RBC SED RATE AUTOMATED: CPT | Performed by: PSYCHIATRY & NEUROLOGY

## 2018-06-02 PROCEDURE — 85306 CLOT INHIBIT PROT S FREE: CPT | Performed by: PSYCHIATRY & NEUROLOGY

## 2018-06-02 PROCEDURE — 85613 RUSSELL VIPER VENOM DILUTED: CPT | Performed by: PSYCHIATRY & NEUROLOGY

## 2018-06-02 PROCEDURE — 25000128 H RX IP 250 OP 636: Performed by: INTERNAL MEDICINE

## 2018-06-02 PROCEDURE — 25000128 H RX IP 250 OP 636: Performed by: EMERGENCY MEDICINE

## 2018-06-02 PROCEDURE — 00000146 ZZHCL STATISTIC GLUCOSE BY METER IP

## 2018-06-02 PROCEDURE — A9585 GADOBUTROL INJECTION: HCPCS | Performed by: EMERGENCY MEDICINE

## 2018-06-02 PROCEDURE — 99285 EMERGENCY DEPT VISIT HI MDM: CPT | Mod: 25

## 2018-06-02 PROCEDURE — 99223 1ST HOSP IP/OBS HIGH 75: CPT | Mod: AI | Performed by: INTERNAL MEDICINE

## 2018-06-02 PROCEDURE — 80061 LIPID PANEL: CPT | Performed by: PSYCHIATRY & NEUROLOGY

## 2018-06-02 PROCEDURE — 72141 MRI NECK SPINE W/O DYE: CPT

## 2018-06-02 PROCEDURE — 80048 BASIC METABOLIC PNL TOTAL CA: CPT | Performed by: EMERGENCY MEDICINE

## 2018-06-02 PROCEDURE — 25000132 ZZH RX MED GY IP 250 OP 250 PS 637: Performed by: EMERGENCY MEDICINE

## 2018-06-02 PROCEDURE — 81241 F5 GENE: CPT | Performed by: PSYCHIATRY & NEUROLOGY

## 2018-06-02 PROCEDURE — 81240 F2 GENE: CPT | Performed by: PSYCHIATRY & NEUROLOGY

## 2018-06-02 PROCEDURE — 86146 BETA-2 GLYCOPROTEIN ANTIBODY: CPT | Performed by: PSYCHIATRY & NEUROLOGY

## 2018-06-02 PROCEDURE — 85025 COMPLETE CBC W/AUTO DIFF WBC: CPT | Performed by: EMERGENCY MEDICINE

## 2018-06-02 PROCEDURE — 84702 CHORIONIC GONADOTROPIN TEST: CPT | Performed by: INTERNAL MEDICINE

## 2018-06-02 PROCEDURE — 12000000 ZZH R&B MED SURG/OB

## 2018-06-02 PROCEDURE — 25000132 ZZH RX MED GY IP 250 OP 250 PS 637: Performed by: INTERNAL MEDICINE

## 2018-06-02 PROCEDURE — 36415 COLL VENOUS BLD VENIPUNCTURE: CPT | Performed by: PSYCHIATRY & NEUROLOGY

## 2018-06-02 PROCEDURE — 86147 CARDIOLIPIN ANTIBODY EA IG: CPT | Performed by: PSYCHIATRY & NEUROLOGY

## 2018-06-02 PROCEDURE — 85730 THROMBOPLASTIN TIME PARTIAL: CPT | Performed by: PSYCHIATRY & NEUROLOGY

## 2018-06-02 PROCEDURE — 85300 ANTITHROMBIN III ACTIVITY: CPT | Performed by: PSYCHIATRY & NEUROLOGY

## 2018-06-02 PROCEDURE — 84702 CHORIONIC GONADOTROPIN TEST: CPT | Performed by: PSYCHIATRY & NEUROLOGY

## 2018-06-02 PROCEDURE — 70498 CT ANGIOGRAPHY NECK: CPT

## 2018-06-02 PROCEDURE — 85303 CLOT INHIBIT PROT C ACTIVITY: CPT | Performed by: PSYCHIATRY & NEUROLOGY

## 2018-06-02 PROCEDURE — 99223 1ST HOSP IP/OBS HIGH 75: CPT | Performed by: PSYCHIATRY & NEUROLOGY

## 2018-06-02 PROCEDURE — 85610 PROTHROMBIN TIME: CPT | Performed by: PSYCHIATRY & NEUROLOGY

## 2018-06-02 RX ORDER — ONDANSETRON 4 MG/1
4 TABLET, ORALLY DISINTEGRATING ORAL EVERY 6 HOURS PRN
Status: DISCONTINUED | OUTPATIENT
Start: 2018-06-02 | End: 2018-06-04 | Stop reason: HOSPADM

## 2018-06-02 RX ORDER — SODIUM CHLORIDE 9 MG/ML
INJECTION, SOLUTION INTRAVENOUS CONTINUOUS
Status: DISCONTINUED | OUTPATIENT
Start: 2018-06-02 | End: 2018-06-04 | Stop reason: HOSPADM

## 2018-06-02 RX ORDER — LABETALOL HYDROCHLORIDE 5 MG/ML
10-40 INJECTION, SOLUTION INTRAVENOUS EVERY 10 MIN PRN
Status: DISCONTINUED | OUTPATIENT
Start: 2018-06-02 | End: 2018-06-04 | Stop reason: HOSPADM

## 2018-06-02 RX ORDER — POTASSIUM CHLORIDE 7.45 MG/ML
10 INJECTION INTRAVENOUS
Status: DISCONTINUED | OUTPATIENT
Start: 2018-06-02 | End: 2018-06-04 | Stop reason: HOSPADM

## 2018-06-02 RX ORDER — POTASSIUM CL/LIDO/0.9 % NACL 10MEQ/0.1L
10 INTRAVENOUS SOLUTION, PIGGYBACK (ML) INTRAVENOUS
Status: DISCONTINUED | OUTPATIENT
Start: 2018-06-02 | End: 2018-06-04 | Stop reason: HOSPADM

## 2018-06-02 RX ORDER — POTASSIUM CHLORIDE 29.8 MG/ML
20 INJECTION INTRAVENOUS
Status: DISCONTINUED | OUTPATIENT
Start: 2018-06-02 | End: 2018-06-04 | Stop reason: HOSPADM

## 2018-06-02 RX ORDER — POTASSIUM CHLORIDE 1.5 G/1.58G
20-40 POWDER, FOR SOLUTION ORAL
Status: DISCONTINUED | OUTPATIENT
Start: 2018-06-02 | End: 2018-06-04 | Stop reason: HOSPADM

## 2018-06-02 RX ORDER — ASPIRIN 81 MG/1
81 TABLET ORAL DAILY
Status: DISCONTINUED | OUTPATIENT
Start: 2018-06-03 | End: 2018-06-04 | Stop reason: HOSPADM

## 2018-06-02 RX ORDER — ACETAMINOPHEN 650 MG/1
650 SUPPOSITORY RECTAL EVERY 4 HOURS PRN
Status: DISCONTINUED | OUTPATIENT
Start: 2018-06-02 | End: 2018-06-04 | Stop reason: HOSPADM

## 2018-06-02 RX ORDER — ACETAMINOPHEN 325 MG/1
650 TABLET ORAL EVERY 4 HOURS PRN
Status: DISCONTINUED | OUTPATIENT
Start: 2018-06-02 | End: 2018-06-04 | Stop reason: HOSPADM

## 2018-06-02 RX ORDER — LIDOCAINE 40 MG/G
CREAM TOPICAL
Status: DISCONTINUED | OUTPATIENT
Start: 2018-06-02 | End: 2018-06-04 | Stop reason: HOSPADM

## 2018-06-02 RX ORDER — ONDANSETRON 2 MG/ML
4 INJECTION INTRAMUSCULAR; INTRAVENOUS EVERY 6 HOURS PRN
Status: DISCONTINUED | OUTPATIENT
Start: 2018-06-02 | End: 2018-06-04 | Stop reason: HOSPADM

## 2018-06-02 RX ORDER — GADOBUTROL 604.72 MG/ML
7.5 INJECTION INTRAVENOUS ONCE
Status: COMPLETED | OUTPATIENT
Start: 2018-06-02 | End: 2018-06-02

## 2018-06-02 RX ORDER — ROSUVASTATIN CALCIUM 20 MG/1
20 TABLET, COATED ORAL DAILY
Status: DISCONTINUED | OUTPATIENT
Start: 2018-06-02 | End: 2018-06-04 | Stop reason: HOSPADM

## 2018-06-02 RX ORDER — HYDRALAZINE HYDROCHLORIDE 20 MG/ML
10-20 INJECTION INTRAMUSCULAR; INTRAVENOUS
Status: DISCONTINUED | OUTPATIENT
Start: 2018-06-02 | End: 2018-06-04 | Stop reason: HOSPADM

## 2018-06-02 RX ORDER — ASPIRIN 325 MG
325 TABLET ORAL ONCE
Status: COMPLETED | OUTPATIENT
Start: 2018-06-02 | End: 2018-06-02

## 2018-06-02 RX ORDER — POTASSIUM CHLORIDE 1500 MG/1
20-40 TABLET, EXTENDED RELEASE ORAL
Status: DISCONTINUED | OUTPATIENT
Start: 2018-06-02 | End: 2018-06-04 | Stop reason: HOSPADM

## 2018-06-02 RX ORDER — AMOXICILLIN 250 MG
1 CAPSULE ORAL 2 TIMES DAILY PRN
Status: DISCONTINUED | OUTPATIENT
Start: 2018-06-02 | End: 2018-06-04 | Stop reason: HOSPADM

## 2018-06-02 RX ORDER — IOPAMIDOL 755 MG/ML
70 INJECTION, SOLUTION INTRAVASCULAR ONCE
Status: COMPLETED | OUTPATIENT
Start: 2018-06-02 | End: 2018-06-02

## 2018-06-02 RX ORDER — AMOXICILLIN 250 MG
2 CAPSULE ORAL 2 TIMES DAILY PRN
Status: DISCONTINUED | OUTPATIENT
Start: 2018-06-02 | End: 2018-06-04 | Stop reason: HOSPADM

## 2018-06-02 RX ORDER — IOPAMIDOL 755 MG/ML
70 INJECTION, SOLUTION INTRAVASCULAR ONCE
Status: DISCONTINUED | OUTPATIENT
Start: 2018-06-02 | End: 2018-06-02

## 2018-06-02 RX ORDER — NALOXONE HYDROCHLORIDE 0.4 MG/ML
.1-.4 INJECTION, SOLUTION INTRAMUSCULAR; INTRAVENOUS; SUBCUTANEOUS
Status: DISCONTINUED | OUTPATIENT
Start: 2018-06-02 | End: 2018-06-04 | Stop reason: HOSPADM

## 2018-06-02 RX ADMIN — GADOBUTROL 5 ML: 604.72 INJECTION INTRAVENOUS at 16:05

## 2018-06-02 RX ADMIN — IOPAMIDOL 70 ML: 755 INJECTION, SOLUTION INTRAVENOUS at 21:46

## 2018-06-02 RX ADMIN — ASPIRIN 325 MG ORAL TABLET 325 MG: 325 PILL ORAL at 16:47

## 2018-06-02 RX ADMIN — SODIUM CHLORIDE: 9 INJECTION, SOLUTION INTRAVENOUS at 21:59

## 2018-06-02 RX ADMIN — ROSUVASTATIN CALCIUM 20 MG: 20 TABLET, FILM COATED ORAL at 21:16

## 2018-06-02 RX ADMIN — ACETAMINOPHEN 650 MG: 325 TABLET, FILM COATED ORAL at 22:05

## 2018-06-02 ASSESSMENT — ENCOUNTER SYMPTOMS
MYALGIAS: 0
ARTHRALGIAS: 0
ABDOMINAL PAIN: 0
WEAKNESS: 1

## 2018-06-02 ASSESSMENT — VISUAL ACUITY: OU: NORMAL ACUITY

## 2018-06-02 ASSESSMENT — ACTIVITIES OF DAILY LIVING (ADL): ADLS_ACUITY_SCORE: 13

## 2018-06-02 NOTE — IP AVS SNAPSHOT
MRN:[id number]                      After Visit Summary   6/2/2018    Mey Kim    MRN: [id number]           Thank you!     Thank you for choosing [facility] for your care. Our goal is always to provide you with excellent care. Hearing back from our patients is one way we can continue to improve our services. Please take a few minutes to complete the written survey that you may receive in the mail after you visit with us. Thank you!        Patient Information     Date Of Birth          1980        Designated Caregiver       Most Recent Value    Caregiver    Will someone help with your care after discharge? yes    Name of designated caregiver see chart    Phone number of caregiver see chart    Caregiver address see chart      About your hospital stay     You were admitted on:  June 2, 2018 You last received care in the:  [place] [facility] [address] [facility]    You were discharged on:  June 4, 2018        Reason for your hospital stay       stroke                  Who to Call     For medical emergencies, please call 911.  For non-urgent questions about your medical care, please call your primary care provider or clinic, [phone]          Attending Provider     Provider Specialty    Nolvia Andino MD Internal Medicine       Primary Care Provider Office Phone # Fax #    Eribertomarkiekirandain BRETT Martines [phone]      After Care Instructions     Activity       Your activity upon discharge: activity as tolerated            Diet       Follow this diet upon discharge:       Combination Diet Low Saturated Fat Na <2400mg Diet                  Follow-up Appointments     Follow-up and recommended labs and tests        You have a Neurology Follow Up Appointment:  Thursday July 5. 1:45 pm  [facility]   [facility] [address] [facility], [address]   [place] [address]   [phone]            Follow-up and recommended labs and tests        Follow up with primary  "care provider, Aidee Martines, within 7 days to evaluate medication change.  No follow up labs or test are needed.                  Your next 10 appointments already scheduled     Jun 11, 2018  1:00 PM CDT   Office Visit with Aidee Martines MD   [facility]    [address]  [facility] [address]   [phone]           Bring a current list of meds and any records pertaining to this visit. For Physicals, please bring immunization records and any forms needing to be filled out. Please arrive 10 minutes early to complete paperwork.              Additional Services     Physical Therapy Referral       *This therapy referral will be filtered to a centralized scheduling office at [facility] and the patient will receive a call to schedule an appointment at a [facility] location most convenient for them. *     [facility] provides Physical Therapy evaluation and treatment and many specialty services across the [facility] system.  If requesting a specialty program, please choose from the list below.    If you have not heard from the scheduling office within 2 business days, please call [phone] for all locations, with the exception of [place], please call [phone] and [facility], please call [phone]  Treatment: Evaluation & Treatment  Special Instructions/Modalities: none  Special Programs: None    Please be aware that coverage of these services is subject to the terms and limitations of your health insurance plan.  Call member services at your health plan with any benefit or coverage questions.      **Note to Provider:  If you are referring outside of [facility] for the therapy appointment, please list the name of the location in the \"special instructions\" above, print the referral and give to the patient to schedule the appointment.                  Further instructions from your care team     "      Your risk factors for stroke or TIA (transient ischemic attack):    Your Risk Factors Your Results Normal Ranges   High blood pressure BP Readings from Last 1 Encounters:   06/04/18 138/82    Less than 120/80   Cholesterol              Total Lab Results   Component Value Date    CHOL 144 06/03/2018      Less than 150    Triglycerides   Lab Results   Component Value Date    TRIG 81 06/03/2018    Less than 150   LDL Lab Results   Component Value Date    LDL 70 06/03/2018       Less than 70   HDL Lab Results   Component Value Date    HDL 58 06/03/2018            Greater than 40 (men)  Greater than 50 (women)   Diabetes   Recent Labs  Lab 06/03/18  0410   *    Fasting blood glucose    Smoking/tobacco use  Quit smoking and tobacco   Overweight  Lose 1-2 pounds a week   Lack of exercise  30 minutes moderate activity each day   Other risk factors include carotid (neck) artery disease, atrial fibrillation and stress. You may be on new medicine to treat high blood pressure, cholesterol, diabetes or atrial fibrillation.    Understanding Stroke Booklet given to patient. Please refer to booklet for further information.    Stroke warning signs and symptoms - CALL 911 right away for:  - Sudden numbness or weakness in the face, arm or leg (often on one side of the body).  - Sudden confusion or trouble understanding what is going on.  - Sudden blurred or decreased vision in one or both eyes.  - Sudden trouble speaking, loss of balance, dizziness or problems with coordination.  - Sudden, severe headache for no reason.  - Fainting or seizures.  - Symptoms may go away then come back suddenly.      Pending Results     Date and Time Order Name Status Description    6/2/2018 1951 EKG 12-lead, tracing only Preliminary     6/2/2018 1858 Factor 2 and 5 mutation analysis In process     6/2/2018 1858 Protein S Antigen Free In process     6/2/2018 1858 Lupus Anticoagulant Panel In process     6/2/2018 1858 Protein C  "chromogenic In process     2018 1858 Factor 5 leiden mutation analysis In process     2018 1858 F2 prothrombin [id number] Mut Anal In process             Statement of Approval     Ordered          18 1305  I have reviewed and agree with all the recommendations and orders detailed in this document.  EFFECTIVE NOW     Approved and electronically signed by:  Mila Jones MD             Admission Information     Date & Time Provider Department Dept. Phone    2018 Nolvia Andino MD [place] [facility] [address] [facility] [phone]      Your Vitals Were     Blood Pressure Pulse Temperature Respirations Weight Pulse Oximetry    138/82 (BP Location: Left arm) 75 98.1  F (36.7  C) (Oral) 16 57.2 kg (126 lb 3.2 oz) 99%    BMI (Body Mass Index)                   24.65 kg/m2           MyChart Information     [employer] lets you send messages to your doctor, view your test results, renew your prescriptions, schedule appointments and more. To sign up, go to [email or web address] . Click on \"Log in\" on the left side of the screen, which will take you to the Welcome page. Then click on \"Sign up Now\" on the right side of the page.     You will be asked to enter the access code listed below, as well as some personal information. Please follow the directions to create your username and password.     Your access code is: [email or web address]  Expires: 2018  9:47 AM     Your access code will  in 90 days. If you need help or a new code, please call your [place] clinic or [phone].        Care EveryWhere ID     This is your Care EveryWhere ID. This could be used by other organizations to access your [place] medical records  [id number]        Equal Access to Services     ED PAULINO : Maria Elena Roberson, bronwyn pavon, milad lee. So [facility] [phone].    ATENCIÓN: Si habla español, tiene a miguel disposición servicios " dulce de asistencia lingüística. Carrie cruz [phone].    We comply with applicable federal civil rights laws and [place] laws. We do not discriminate on the basis of race, color, national origin, age, disability, sex, sexual orientation, or gender identity.               Review of your medicines      START taking        Dose / Directions    aspirin 81 MG EC tablet   Used for:  Acute ischemic stroke (H)        Dose:  81 mg   Start taking on:  6/5/2018   Take 1 tablet (81 mg) by mouth daily   Quantity:  90 tablet   Refills:  3       lisinopril 2.5 MG tablet   Commonly known as:  PRINIVIL/Zestril   Used for:  Benign essential hypertension        Dose:  2.5 mg   Start taking on:  6/5/2018   Take 1 tablet (2.5 mg) by mouth daily   Quantity:  30 tablet   Refills:  3       order for DME   Used for:  S/P dilation and curettage        Equipment being ordered: Walker Wheels () and Walker () Treatment Diagnosis: Impaired gait.   Quantity:  1 each   Refills:  0       rosuvastatin 20 MG tablet   Commonly known as:  CRESTOR   Used for:  Acute ischemic stroke (H)        Dose:  20 mg   Take 1 tablet (20 mg) by mouth daily   Quantity:  30 tablet   Refills:  3         CONTINUE these medicines which have NOT CHANGED        Dose / Directions    ACETAMINOPHEN PO        Dose:  500 mg   Take 500 mg by mouth every 6 hours as needed for pain   Refills:  0       hypromellose 0.5 % Soln ophthalmic solution   Commonly known as:  ARTIFICIAL TEARS        Dose:  1 drop   1 drop every hour as needed for dry eyes   Refills:  0         STOP taking     IBUPROFEN PO                Where to get your medicines      These medications were sent to [facility] - [address]  [address] [place] [address] [address]     Phone:  [phone]     aspirin 81 MG EC tablet    lisinopril 2.5 MG tablet    rosuvastatin 20 MG tablet         Some of these will need a paper prescription and others can be bought  over the counter. Ask your nurse if you have questions.     Bring a paper prescription for each of these medications     order for DME                Protect others around you: Learn how to safely use, store and throw away your medicines at www.disposemymeds.org.             Medication List: This is a list of all your medications and when to take them. Check marks below indicate your daily home schedule. Keep this list as a reference.      Medications           Morning Afternoon Evening Bedtime As Needed    ACETAMINOPHEN PO   Take 500 mg by mouth every 6 hours as needed for pain   Last time this was given:  650 mg on 6/4/2018  3:48 AM                                aspirin 81 MG EC tablet   Take 1 tablet (81 mg) by mouth daily   Start taking on:  6/5/2018   Last time this was given:  81 mg on 6/4/2018  8:50 AM                                hypromellose 0.5 % Soln ophthalmic solution   Commonly known as:  ARTIFICIAL TEARS   1 drop every hour as needed for dry eyes                                lisinopril 2.5 MG tablet   Commonly known as:  PRINIVIL/Zestril   Take 1 tablet (2.5 mg) by mouth daily   Start taking on:  6/5/2018   Last time this was given:  2.5 mg on 6/4/2018 10:05 AM                                order for DME   Equipment being ordered: Walker Wheels () and Walker () Treatment Diagnosis: Impaired gait.                                rosuvastatin 20 MG tablet   Commonly known as:  CRESTOR   Take 1 tablet (20 mg) by mouth daily   Last time this was given:  20 mg on 6/3/2018  7:26 PM                                          More Information        Lisinopril Oral tablet  What is this medicine?  LISINOPRIL (lyse IN oh pril) is an ACE inhibitor. This medicine is used to treat high blood pressure and heart failure. It is also used to protect the heart immediately after a heart attack.  This medicine may be used for other purposes; ask your health care provider or pharmacist if you have  questions.  What should I tell my health care provider before I take this medicine?  They need to know if you have any of these conditions:    diabetes    heart or blood vessel disease    immune system disease like lupus or scleroderma    kidney disease    low blood pressure    previous swelling of the tongue, face, or lips with difficulty breathing, difficulty swallowing, hoarseness, or tightening of the throat    an unusual or allergic reaction to lisinopril, other ACE inhibitors, insect venom, foods, dyes, or preservatives    pregnant or trying to get pregnant    breast-feeding  How should I use this medicine?  Take this medicine by mouth with a glass of water. Follow the directions on your prescription label. You may take this medicine with or without food. Take your medicine at regular intervals. Do not stop taking this medicine except on the advice of your doctor or health care professional.  Talk to your pediatrician regarding the use of this medicine in children. Special care may be needed. While this drug may be prescribed for children as young as 6 years of age for selected conditions, precautions do apply.  Overdosage: If you think you have taken too much of this medicine contact a poison control center or emergency room at once.  NOTE: This medicine is only for you. Do not share this medicine with others.  What if I miss a dose?  If you miss a dose, take it as soon as you can. If it is almost time for your next dose, take only that dose. Do not take double or extra doses.  What may interact with this medicine?    diuretics    lithium    NSAIDs, medicines for pain and inflammation, like ibuprofen or naproxen    over-the-counter herbal supplements like hawthorn    potassium salts or potassium supplements    salt substitutes  This list may not describe all possible interactions. Give your health care provider a list of all the medicines, herbs, non-prescription drugs, or dietary supplements you use. Also  tell them if you smoke, drink alcohol, or use illegal drugs. Some items may interact with your medicine.  What should I watch for while using this medicine?  Visit your doctor or health care professional for regular check ups. Check your blood pressure as directed. Ask your doctor what your blood pressure should be, and when you should contact him or her. Call your doctor or health care professional if you notice an irregular or fast heart beat.  Women should inform their doctor if they wish to become pregnant or think they might be pregnant. There is a potential for serious side effects to an unborn child. Talk to your health care professional or pharmacist for more information.  Check with your doctor or health care professional if you get an attack of severe diarrhea, nausea and vomiting, or if you sweat a lot. The loss of too much body fluid can make it dangerous for you to take this medicine.  You may get drowsy or dizzy. Do not drive, use machinery, or do anything that needs mental alertness until you know how this drug affects you. Do not stand or sit up quickly, especially if you are an older patient. This reduces the risk of dizzy or fainting spells. Alcohol can make you more drowsy and dizzy. Avoid alcoholic drinks.  Avoid salt substitutes unless you are told otherwise by your doctor or health care professional.  Do not treat yourself for coughs, colds, or pain while you are taking this medicine without asking your doctor or health care professional for advice. Some ingredients may increase your blood pressure.  What side effects may I notice from receiving this medicine?  Side effects that you should report to your doctor or health care professional as soon as possible:    abdominal pain with or without nausea or vomiting    allergic reactions like skin rash or hives, swelling of the hands, feet, face, lips, throat, or tongue    dark urine    difficulty breathing    dizzy, lightheaded or fainting  spell    fever or sore throat    irregular heart beat, chest pain    pain or difficulty passing urine    redness, blistering, peeling or loosening of the skin, including inside the mouth    unusually weak    yellowing of the eyes or skin  Side effects that usually do not require medical attention (report to your doctor or health care professional if they continue or are bothersome):    change in taste    cough    decreased sexual function or desire    headache    sun sensitivity    tiredness  This list may not describe all possible side effects. Call your doctor for medical advice about side effects. You may report side effects to FDA at 8-993-FDA-8592.  Where should I keep my medicine?  Keep out of the reach of children.  Store at room temperature between 15 and 30 degrees C (59 and 86 degrees F). Protect from moisture. Keep container tightly closed. Throw away any unused medicine after the expiration date.  NOTE:This sheet is a summary. It may not cover all possible information. If you have questions about this medicine, talk to your doctor, pharmacist, or health care provider. Copyright  2016 [employer]

## 2018-06-02 NOTE — ED PROVIDER NOTES
History     Chief Complaint:  One-sided Weakness    HPI   Mey Kim is a 38 year old female with a history of hypertension who presents with one-sided weakness. The patient notes that starting two days ago she began feeling tingling and weakness in her left arm and leg. She states that her left arm is slow and clumsy, and she has difficulty grasping objects. She notes that she has reduced strength in her left leg, and feels as though she has to swing it and that her left foot is dragging when she walks. The symptoms have remained the same since they began, and she denies having similar symptoms in the past, vision problems, any pain, and neck or head injuries. She has no history of heart problems and no family history of stroke.    Allergies:  No known drug allergies    Medications:    The patient is currently on no regular medications.    Past Medical History:    Hypertension    Past Surgical History:    D & C    Family History:    History reviewed. No pertinent family history.     Social History:  Smoking status: Current every day smoker  Marital Status:  Single [1]    Review of Systems   Cardiovascular: Negative for chest pain.   Gastrointestinal: Negative for abdominal pain.   Musculoskeletal: Negative for arthralgias and myalgias.   Neurological: Positive for weakness (left side).        Left sided tingling   All other systems reviewed and are negative.    Physical Exam     Patient Vitals for the past 24 hrs:   BP Temp Temp src Pulse Resp SpO2 Height Weight   06/02/18 1630 - - - - - 100 % - -   06/02/18 1624 - - - - - 100 % - -   06/02/18 1500 (!) 154/98 - - - - - - -   06/02/18 1400 161/81 - - - - 100 % - -   06/02/18 1341 - - - - - 100 % - -   06/02/18 1340 (!) 158/97 - - - - 100 % - -   06/02/18 1222 (!) 172/93 98.7  F (37.1  C) Temporal 77 18 100 % 1.524 m (5') 54.4 kg (120 lb)     Physical Exam  Vital signs and nursing notes reviewed.     Constitutional: laying on gurney appears  comfortable  HENT: Oropharynx is clear and moist  Eyes: Conjunctivae are normal bilaterally. Pupils equal, normal EOM's  Neck: normal range of motion  Cardiovascular: Normal rate, regular rhythm, normal heart sounds. Normal distal pulses  Pulmonary/Chest: Effort normal and breath sounds normal. No respiratory distress.   Abdominal: Soft. Bowel sounds are normal. No tenderness to palpation. No rebound or guarding.   Musculoskeletal: No joint swelling or edema.   Neurological: Alert and oriented.  Normal  strength, no drift in left arm or leg, denies paresthesias to light touch, no facial motor weakness.  Subtle weakness of left upper and lower weakness with strength testing.  Mild left upper extremity limb ataxia.  Normal speech  Skin: Skin is warm and dry. No rash noted.   Psych: normal affect      Emergency Department Course   ECG (13:14:44):  Rate 61 bpm. NE interval 154. QRS duration 86. QT/QTc 466/469. P-R-T axes 9 67 36. Normal sinus rhythm. Minimal voltage criteria for LVH, may be normal variant. Borderline ECG.  Interpreted at 1314 by Gabo Givens MD.    Imaging:  Radiographic findings were communicated with the patient who voiced understanding of the findings.    MR Brain w/o and w Contrast  Study is consistent with an acute lacunar type infarct in  the right corona radiata extending into the region of the posterior  limb of the right internal capsule.  As read by Radiology.      Cervical Spine MRI w/o Contrast  1. Right-sided disc protrusions at C4-5 and C5-6. The right-sided disc  at C5-6 extends to the level of the right C5-6 neural foramen. This  could affect the exiting right C6 nerve root.  2. No left-sided disc herniations are seen.  As read by Radiology.    Laboratory:  CBC: HGB 11.2 (L), o/w WNL (WBC 7.6, )   BMP: Calcium 8.1 (L), o/w WNL (Creatinine 0.79)  Erythrocyte sedimentation rate: SED Rate 7    Interventions:  1647: Aspirin 325 mg PO    Emergency Department Course:  Past  medical records, nursing notes, and vitals reviewed.  1252: I performed an exam of the patient and obtained history, as documented above.  IV inserted and blood drawn.  The patient was sent for a brain MRI while in the emergency department, findings above.  EKG obtained, results above.    1614: I rechecked the patient. Explained findings to the patient.    1619: I spoke to Dr. Lara of stroke neurology regarding the MRI findings. He recommended transfer to [facility] and giving aspirin.  Continuous cardiac monitoring initiated.    1633: I spoke to Dr. Andino of the hospitalist service at [facility] who accepts the patient for admission.      Findings and plan explained to the patient.    Patient will be transferred to [facility] via EMS. Discussed the case with Dr. Andino, who will admit the patient to a monitored bed for further monitoring, evaluation, and treatment.      Impression & Plan    CMS Diagnoses: The patient has stroke symptoms:           ED Stroke specific documentation           NIHSS PDF          Protocol PDF     Patient last known well time: 5/31, evening  ED Provider first to bedside at: 1252  MR Results received at: 1624  Patient was not treated with TPA due to the following reason(s):  Out of the treatment time window    National Institutes of Health Stroke Scale (Baseline)  Time Performed: 1252      Score    Level of consciousness: (0)   Alert, keenly responsive    LOC questions: (0)   Answers both questions correctly    LOC commands: (0)   Performs both tasks correctly    Best gaze: (0)   Normal    Visual: (0)   No visual loss    Facial palsy: (0)   Normal symmetrical movements    Motor arm (left): (0)   No drift    Motor arm (right): (0)   No drift    Motor leg (left): (0)   No drift    Motor leg (right): (0)   No drift    Limb ataxia: (1)   Present in one limb    Sensory: (0)   Normal- no sensory loss    Best language: (0)   Normal- no aphasia    Dysarthria:  (0)   Normal    Extinction and inattention: (0)   No abnormality        Total Score:  1        Stroke Mimics were considered (including migraine headache, seizure disorder, hypoglycemia (or hyperglycemia), head or spinal trauma, CNS infection, Toxin ingestion and shock state (e.g. sepsis) .    Medical Decision Making:  Mey Kim is a 38 year old female who presents with two days of symptoms of left leg and left arm weakness. She describes symptoms where the left leg is heavy and it is somewhat difficult to walk as well as problems with gripping and grabbing things in her left upper extremity. She has no associated pain in her head and neck or vision disturbance. On examination she had very subtle findings of left-sided weakness but mostly slight ataxia of the left upper extremity compared to the right. She has no confusion or disorientation. Based on the duration of the symptoms she did not meet criteria for TPA as stated above. MRI of the brain and the neck were obtained due to the localizing findings and persistent symptoms she has experienced over the past two days. This did show findings of an acute right-sided lacunar stroke, there is no hemorrhage or mass noted. Cervical spine did not show any suggestion of anything that would cause left-side symptoms. I reviewed the results with the patient, there is still unclear etiology what would have triggered stroke at the patient's young age. There is consideration of coagulopathy, ventricle or septal defect which is the most likely cause. She does not have any risk factors other than hypertension for stroke. I discussed this with Dr. Lara who is on call for stroke neurology. She will be transferred to [facility] for further evaluation and treatment due to her young age versus admitting here to [facility] at this time. I spoke with the hospitalist service who agrees to accept the patient. Dr. Lara recommended starting her on aspirin and didn't  recommend anticoagulation at this time pending further testing. Patient agrees with plan to transfer and was transferred.     Diagnosis:    ICD-10-CM   1. Lacunar infarct, acute (H) I63.9   2. Hypertension, unspecified type I10       Disposition:  Transferred to [facility] in the care of Dr. Andino.    Sixto Roach  6/2/2018   [facility] EMERGENCY DEPARTMENT    I, Sixto Roach, am serving as a scribe at 12:52 PM on 6/2/2018 to document services personally performed by Gabo Givens MD based on my observations and the provider's statements to me.        Gabo Givens MD  06/02/18 1807       Gabo Givens MD  06/02/18 1809

## 2018-06-02 NOTE — ED TRIAGE NOTES
Patient states that on Thursday evening she noticed weakness in left arm and leg. Neuros intact, able to answer questions appropriately. States that she is having a hard time moving her arm, gripping on to things and walking is difficult. Denies pain.

## 2018-06-02 NOTE — IP AVS SNAPSHOT
[address] [facility]    Formerly named [facility] [address]    [place] [address]    Phone:  [phone]                                       After Visit Summary   6/2/2018    Mey Kim    MRN: [id number]           After Visit Summary Signature Page     I have received my discharge instructions, and my questions have been answered. I have discussed any challenges I see with this plan with the nurse or doctor.    ..........................................................................................................................................  Patient/Patient Representative Signature      ..........................................................................................................................................  Patient Representative Print Name and Relationship to Patient    ..................................................               ................................................  Date                                            Time    ..........................................................................................................................................  Reviewed by Signature/Title    ...................................................              ..............................................  Date                                                            Time

## 2018-06-03 ENCOUNTER — APPOINTMENT (OUTPATIENT)
Dept: OCCUPATIONAL THERAPY | Facility: CLINIC | Age: 38
DRG: 065 | End: 2018-06-03
Attending: INTERNAL MEDICINE
Payer: COMMERCIAL

## 2018-06-03 ENCOUNTER — APPOINTMENT (OUTPATIENT)
Dept: PHYSICAL THERAPY | Facility: CLINIC | Age: 38
DRG: 065 | End: 2018-06-03
Attending: INTERNAL MEDICINE
Payer: COMMERCIAL

## 2018-06-03 ENCOUNTER — APPOINTMENT (OUTPATIENT)
Dept: CT IMAGING | Facility: CLINIC | Age: 38
DRG: 065 | End: 2018-06-03
Attending: INTERNAL MEDICINE
Payer: COMMERCIAL

## 2018-06-03 ENCOUNTER — APPOINTMENT (OUTPATIENT)
Dept: CARDIOLOGY | Facility: CLINIC | Age: 38
DRG: 065 | End: 2018-06-03
Attending: INTERNAL MEDICINE
Payer: COMMERCIAL

## 2018-06-03 LAB
ALBUMIN SERPL-MCNC: 3.1 G/DL (ref 3.4–5)
ALP SERPL-CCNC: 44 U/L (ref 40–150)
ALT SERPL W P-5'-P-CCNC: 16 U/L (ref 0–50)
ANION GAP SERPL CALCULATED.3IONS-SCNC: 7 MMOL/L (ref 3–14)
AST SERPL W P-5'-P-CCNC: 12 U/L (ref 0–45)
BILIRUB DIRECT SERPL-MCNC: <0.1 MG/DL (ref 0–0.2)
BILIRUB SERPL-MCNC: 0.3 MG/DL (ref 0.2–1.3)
BUN SERPL-MCNC: 8 MG/DL (ref 7–30)
CALCIUM SERPL-MCNC: 7.7 MG/DL (ref 8.5–10.1)
CHLORIDE SERPL-SCNC: 109 MMOL/L (ref 94–109)
CHOLEST SERPL-MCNC: 144 MG/DL
CO2 SERPL-SCNC: 24 MMOL/L (ref 20–32)
CREAT SERPL-MCNC: 0.76 MG/DL (ref 0.52–1.04)
ERYTHROCYTE [DISTWIDTH] IN BLOOD BY AUTOMATED COUNT: 16.6 % (ref 10–15)
FOLATE SERPL-MCNC: 18.5 NG/ML
GFR SERPL CREATININE-BSD FRML MDRD: 85 ML/MIN/1.7M2
GLUCOSE BLDC GLUCOMTR-MCNC: 94 MG/DL (ref 70–99)
GLUCOSE BLDC GLUCOMTR-MCNC: 97 MG/DL (ref 70–99)
GLUCOSE SERPL-MCNC: 102 MG/DL (ref 70–99)
HCT VFR BLD AUTO: 31.1 % (ref 35–47)
HDLC SERPL-MCNC: 58 MG/DL
HGB BLD-MCNC: 10.8 G/DL (ref 11.7–15.7)
IRON SATN MFR SERPL: 15 % (ref 15–46)
IRON SERPL-MCNC: 48 UG/DL (ref 35–180)
LDLC SERPL CALC-MCNC: 70 MG/DL
MCH RBC QN AUTO: 19.4 PG (ref 26.5–33)
MCHC RBC AUTO-ENTMCNC: 34.7 G/DL (ref 31.5–36.5)
MCV RBC AUTO: 56 FL (ref 78–100)
NONHDLC SERPL-MCNC: 86 MG/DL
PLATELET # BLD AUTO: 216 10E9/L (ref 150–450)
POTASSIUM SERPL-SCNC: 3.7 MMOL/L (ref 3.4–5.3)
PROT SERPL-MCNC: 6.3 G/DL (ref 6.8–8.8)
RBC # BLD AUTO: 5.57 10E12/L (ref 3.8–5.2)
SODIUM SERPL-SCNC: 140 MMOL/L (ref 133–144)
TIBC SERPL-MCNC: 314 UG/DL (ref 240–430)
TRIGL SERPL-MCNC: 81 MG/DL
TROPONIN I SERPL-MCNC: 0.02 UG/L (ref 0–0.04)
VIT B12 SERPL-MCNC: 278 PG/ML (ref 193–986)
WBC # BLD AUTO: 8.9 10E9/L (ref 4–11)

## 2018-06-03 PROCEDURE — 25000132 ZZH RX MED GY IP 250 OP 250 PS 637: Performed by: INTERNAL MEDICINE

## 2018-06-03 PROCEDURE — 40000895 ZZH STATISTIC SLP IP EVAL DEFER: Performed by: SPEECH-LANGUAGE PATHOLOGIST

## 2018-06-03 PROCEDURE — 83550 IRON BINDING TEST: CPT | Performed by: INTERNAL MEDICINE

## 2018-06-03 PROCEDURE — 40000133 ZZH STATISTIC OT WARD VISIT: Performed by: OCCUPATIONAL THERAPIST

## 2018-06-03 PROCEDURE — 93306 TTE W/DOPPLER COMPLETE: CPT | Mod: 26 | Performed by: INTERNAL MEDICINE

## 2018-06-03 PROCEDURE — 25000125 ZZHC RX 250: Performed by: INTERNAL MEDICINE

## 2018-06-03 PROCEDURE — 82607 VITAMIN B-12: CPT | Performed by: INTERNAL MEDICINE

## 2018-06-03 PROCEDURE — 97165 OT EVAL LOW COMPLEX 30 MIN: CPT | Mod: GO

## 2018-06-03 PROCEDURE — 25000128 H RX IP 250 OP 636: Performed by: INTERNAL MEDICINE

## 2018-06-03 PROCEDURE — 86038 ANTINUCLEAR ANTIBODIES: CPT | Performed by: INTERNAL MEDICINE

## 2018-06-03 PROCEDURE — 71275 CT ANGIOGRAPHY CHEST: CPT

## 2018-06-03 PROCEDURE — 40000133 ZZH STATISTIC OT WARD VISIT

## 2018-06-03 PROCEDURE — 97116 GAIT TRAINING THERAPY: CPT | Mod: GP | Performed by: PHYSICAL THERAPIST

## 2018-06-03 PROCEDURE — 85027 COMPLETE CBC AUTOMATED: CPT | Performed by: INTERNAL MEDICINE

## 2018-06-03 PROCEDURE — 80048 BASIC METABOLIC PNL TOTAL CA: CPT | Performed by: INTERNAL MEDICINE

## 2018-06-03 PROCEDURE — 86235 NUCLEAR ANTIGEN ANTIBODY: CPT | Performed by: INTERNAL MEDICINE

## 2018-06-03 PROCEDURE — 00000146 ZZHCL STATISTIC GLUCOSE BY METER IP

## 2018-06-03 PROCEDURE — 80061 LIPID PANEL: CPT | Performed by: INTERNAL MEDICINE

## 2018-06-03 PROCEDURE — 93005 ELECTROCARDIOGRAM TRACING: CPT

## 2018-06-03 PROCEDURE — 93010 ELECTROCARDIOGRAM REPORT: CPT | Performed by: INTERNAL MEDICINE

## 2018-06-03 PROCEDURE — 97161 PT EVAL LOW COMPLEX 20 MIN: CPT | Mod: GP | Performed by: PHYSICAL THERAPIST

## 2018-06-03 PROCEDURE — 99232 SBSQ HOSP IP/OBS MODERATE 35: CPT | Performed by: PSYCHIATRY & NEUROLOGY

## 2018-06-03 PROCEDURE — 80076 HEPATIC FUNCTION PANEL: CPT | Performed by: INTERNAL MEDICINE

## 2018-06-03 PROCEDURE — 40000193 ZZH STATISTIC PT WARD VISIT: Performed by: PHYSICAL THERAPIST

## 2018-06-03 PROCEDURE — 12000000 ZZH R&B MED SURG/OB

## 2018-06-03 PROCEDURE — 97535 SELF CARE MNGMENT TRAINING: CPT | Mod: GO

## 2018-06-03 PROCEDURE — 84484 ASSAY OF TROPONIN QUANT: CPT | Performed by: INTERNAL MEDICINE

## 2018-06-03 PROCEDURE — 40000264 ECHO COMPLETE BUBBLE

## 2018-06-03 PROCEDURE — 82746 ASSAY OF FOLIC ACID SERUM: CPT | Performed by: INTERNAL MEDICINE

## 2018-06-03 PROCEDURE — 97530 THERAPEUTIC ACTIVITIES: CPT | Mod: GP | Performed by: PHYSICAL THERAPIST

## 2018-06-03 PROCEDURE — 99407 BEHAV CHNG SMOKING > 10 MIN: CPT | Performed by: OCCUPATIONAL THERAPIST

## 2018-06-03 PROCEDURE — 36415 COLL VENOUS BLD VENIPUNCTURE: CPT | Performed by: INTERNAL MEDICINE

## 2018-06-03 PROCEDURE — 83540 ASSAY OF IRON: CPT | Performed by: INTERNAL MEDICINE

## 2018-06-03 PROCEDURE — 99232 SBSQ HOSP IP/OBS MODERATE 35: CPT | Performed by: INTERNAL MEDICINE

## 2018-06-03 RX ORDER — IOPAMIDOL 755 MG/ML
80 INJECTION, SOLUTION INTRAVASCULAR ONCE
Status: COMPLETED | OUTPATIENT
Start: 2018-06-03 | End: 2018-06-03

## 2018-06-03 RX ADMIN — SODIUM CHLORIDE: 9 INJECTION, SOLUTION INTRAVENOUS at 07:47

## 2018-06-03 RX ADMIN — ROSUVASTATIN CALCIUM 20 MG: 20 TABLET, FILM COATED ORAL at 19:26

## 2018-06-03 RX ADMIN — ASPIRIN 81 MG: 81 TABLET, COATED ORAL at 11:53

## 2018-06-03 RX ADMIN — IOPAMIDOL 80 ML: 755 INJECTION, SOLUTION INTRAVENOUS at 12:58

## 2018-06-03 RX ADMIN — SODIUM CHLORIDE 80 ML: 9 INJECTION, SOLUTION INTRAVENOUS at 12:58

## 2018-06-03 RX ADMIN — SODIUM CHLORIDE: 9 INJECTION, SOLUTION INTRAVENOUS at 19:05

## 2018-06-03 ASSESSMENT — ACTIVITIES OF DAILY LIVING (ADL)
ADLS_ACUITY_SCORE: 9
ADLS_ACUITY_SCORE: 9
ADLS_ACUITY_SCORE: 8
ADLS_ACUITY_SCORE: 9
PREVIOUS_RESPONSIBILITIES: MEAL PREP;HOUSEKEEPING;LAUNDRY;SHOPPING;YARDWORK;FINANCES;DRIVING

## 2018-06-03 ASSESSMENT — VISUAL ACUITY
OU: NORMAL ACUITY

## 2018-06-03 NOTE — PLAN OF CARE
Problem: Patient Care Overview  Goal: Plan of Care/Patient Progress Review  Outcome: No Change  A&Ox4. Transferred from [facility] at 1840. NIH score 6. Slurred speech, L facial droop, L extension of tongue. LUE/LLE drift. Slight LUE weakness. Ataxic with LUE for finger to nose and LLE for heel to shin. N/T LUE/LLE. NS in RUE at 100 ml/hr. Denies headahce. VSS on RA. Tele NSR.  Passed nursing bedside swallow, ordered by Dr. Lara. Speech will still need to see her in the morning. Cardiac diet. Up with SBA. Denies pain. Plan pending CTA results.

## 2018-06-03 NOTE — CONSULTS
[facility]      Neurology Stroke Consult    Patient Name: Mey Kim  : 1980 MRN#: [id number]    STROKE DATA    Stroke Code:  Stroke code not activated.  Time patient seen:  2018 1900  Last known normal (pt's baseline):  18    TPA treatment:  Not given due to outside the time window.     National Institutes of Health Stroke Scale (at presentation)  NIHSS done at:  time patient seen      Score    Level of consciousness:  (0)   Alert, keenly responsive     LOC questions:  (0)   Answers both questions correctly    LOC commands:  (0)   Performs both tasks correctly    Best gaze:  (0)   Normal    Visual:  (0)   No visual loss    Facial palsy:  (0)   Normal symmetrical movements    Motor arm (left):  (0)   No drift    Motor arm (right):  (0)   No drift    Motor leg (left):  (0)   No drift    Motor leg (right):  (0)   No drift    Limb ataxia:  (2)   Present in two limbs    Sensory:  (1)   Mild to moderate sensory loss    Best language:  (0)   Normal- no aphasia    Dysarthria:  (0)   Normal    Extinction and inattention:  (0)   No abnormality        NIHSS Total Score:  3           ASSESSMENT & RECOMMENDATIONS     The patient was seen for left sided weakness, ataxia, and numbness.  The differential diagnosis includes stroke.     Impression:   38-year-old woman presenting to the hospital from home with an acute onset of left-sided weakness/imbalance.  MRI demonstrated a right thalamocapsular infarct, suspect anterior choroidal artery.  With the anterior choroidal artery expect more weakness in the lateral street arteries may be those that are actually infected.  Etiology is not clear at this point in time.  She will have an echocardiogram, CT/CTA to examine head and neck blood vessels and have a hypercoagulable, panel sent.    Recommendations:  Acute Ischemic Stroke (without tPA) Plan  - Admit to Neurology  - Neurochecks  - Permissive HTN; labetalol PRN for SBP > 220  - Euthermia,  Euglycemia  - Daily aspirin for secondary stroke prevention  - Statin  - MRI/MRA Stroke Protocol  - TTE with Bubble Study  - Telemetry, EKG  - Bedside Glucose Monitoring  - A1c, Lipid Panel, Troponin x 3  - PT/OT/SLP  - PM&R  - Stroke Education  - Depression Screen  - Apnea Screen  - hypercoaguable panel, A1c, lipid panel    Prophylaxis          For VTE Prevention:  - pneumatic compression device     The patient will be managed by the hospitalist team and  followed by the Stroke Consult service.    GLADIS Kim is a 38 year old female with acute onset of left sided weakness and ataxia.  This past Thursday she was out smoking a cigarette when she came back into the house and noted that her left side started to become extremely weak.  She went to bed that evening and had difficulty sleeping.  During the night she noted that she had difficulty moving her left arm and left leg.  She will the next day and noted some difficulty with walking and some increased difficulty in keeping her left leg from dragging.  She presented to the hospital with these complaints today.    Pertinent Past Medical/Surgical History  Past Medical History:   Diagnosis Date     Hypertension        Past Surgical History:   Procedure Laterality Date     DILATION AND CURETTAGE SUCTION WITH ULTRASOUND GUIDANCE N/A 3/31/2017    Procedure: DILATION AND CURETTAGE SUCTION WITH ULTRASOUND GUIDANCE;  Surgeon: Cary Ascencio MD;  Location:  OR       Medications: None.    Allergies: None.    Family History: Mother's healthy with no acute medical issues.  Father is  from liver and lung cancer.  She has one brother and one sister both of whom are healthy.  She has 2 children both of whom are healthy with no medical issues.  There is no family history of stroke, coronary disease, or any issues with bleeding or clotting.    Social History: Current smoker, occasional alcohol, occasional marijuana, no other illicit substances (cocaine,  methamphetamine).    Tobacco use: Current smoker    ROS:  The 10 point Review of Systems is negative other than noted in the HPI.    PHYSICAL EXAMINATION  Vital Signs:  B/P: 179/108,  T: 98.7,  P: Data Unavailable,  R: 16    General:  patient lying in bed without any acute distress    HEENT:  normocephalic/atraumatic  Cardio:  RRR  Pulmonary:  no respiratory distress  Abdomen:  soft, non-tender, non-distended  Extremities:  no edema  Skin:  intact     Neurologic  Mental Status:  fully alert, attentive and oriented, follows commands, speech clear and fluent  Cranial Nerves:  visual fields intact, PERRL, EOMI with normal smooth pursuit, facial sensation intact and symmetric, facial movements symmetric, hearing not formally tested but intact to conversation, palate elevation symmetric and uvula midline, no dysarthria, shoulder shrug strong bilaterally, tongue protrusion midline  Motor:  no abnormal movements, normal tone throughout, normal muscle bulk, Very subtle decreased strength of left upper extremity to extension, left lower extremity with very subtle hip flexor weakness.  Reflexes:  2+ and symmetric throughout  Sensory:  Complaint of tingling of left upper extremity, subtle differences in sensation to light touch.  Coordination:  Finger to nose and heel to shin on left upper and left lower extremity respectively with ataxia.  Station/Gait:  Difficulty maintaining balance    Labs  Labs and Imaging reviewed and used in developing the plan; pertinent results included.     Lab Results   Component Value Date    GLC 98 06/02/2018       Dayton Lara MD  Vascular Neurology/Neurocritical Care  Text Page - [id number]

## 2018-06-03 NOTE — PHARMACY-ADMISSION MEDICATION HISTORY
Admission medication history interview status for the 6/2/2018  admission is complete. See EPIC admission navigator for prior to admission medications     Medication history source reliability:Good    Actions taken by pharmacist (provider contacted, etc):None     Additional medication history information not noted on PTA med list :None    Medication reconciliation/reorder completed by provider prior to medication history? No    Time spent in this activity: 15min, interview, patient oriented x4, not on any rx meds.  Went through [facility] and [facility] medlist and patient not taking any of the meds.  Recently returned from trip to [facility].      Prior to Admission medications    Medication Sig Last Dose Taking? Auth Provider   ACETAMINOPHEN PO Take 500 mg by mouth every 6 hours as needed for pain Past Month at Unknown time Yes Unknown, Entered By History   hypromellose (ARTIFICIAL TEARS) 0.5 % SOLN ophthalmic solution 1 drop every hour as needed for dry eyes Past Month at Unknown time Yes Unknown, Entered By History   IBUPROFEN PO Take 400 mg by mouth every 6 hours as needed for moderate pain Past Week at Unknown time Yes Unknown, Entered By History

## 2018-06-03 NOTE — PLAN OF CARE
Problem: Patient Care Overview  Goal: Plan of Care/Patient Progress Review    Discharge Planner SLP   Patient plan for discharge: Did not state  Current status: EPIC notes were reviewed and nursing/patient were consulted this pm.  Nursing and patient report patient has had no difficulty with speech, language, or swallow function.  Patient passed the nursing swallow screen, and no issues have been reported.  Plan to defer SLP eval and Tx given no needs identified at this time.  Please re-consult if issues are observed.  Barriers to return to prior living situation: No SLP barriers  Recommendations for discharge: Per OT/PT notes  Rationale for recommendations: Per OT.PT notes       Entered by: Hillary Serna 06/03/2018 1:38 PM

## 2018-06-03 NOTE — PLAN OF CARE
Problem: Patient Care Overview  Goal: Plan of Care/Patient Progress Review  Outcome: No Change  A&Ox4. Neuros unchanged. L droop, sensation on L side of face not intact, L side weakness and numbness and tingling LUE and LLE. VSS. Tele NSR. Cardiac diet. Up with SBA. Voiding adequately. Denies pain. Plan for echo today.

## 2018-06-03 NOTE — PROGRESS NOTES
[facility]  Neurology Progress Note  06/03/18    Patient Name: Mey Kim    Interval events:  No interval events.    Objective:  B/P: 137/78, T: 98, P: 71, R: 16    GENERAL: NAD, lying on bed  HEENT: NC/AT. Mucous membranes moist.  CARDIAC: RRR without  Murmur.  RESPIRATORY: Good effort. CTAB. No w/r/r appreciated.  ABDOMINAL: Soft, non tender, non distended. + BS.   EXTREMITIES: Warm, dry.     NEUROLOGIC:  MS: Alert and oriented x 4  CN:    II - visual fields intact  III, IV, VI - EOMI, PERRL, no nystagmus noted  V - facial sensation intact and equal   VII - facial movement symmetrical  VIII - hearing intact to conversation  IX, X - uvula midline  XII - tongue midline with full ROM  MOTOR: normal tone throughout, no abnormal movements, strength Left hemiparesis.  SENSORY: intact and symmetric to light touch throughout upper and lower extremities  REFLEXES: 2+ and symmetric in patellar, biceps, and brachioradialis  COORDINATION: Left hemiataxia.    National Institutes of Health Stroke Scale  Exam Interval: 24 hours post onset of symptom +/- 20 minutes   Score    Level of consciousness: (0)   Alert, keenly responsive    LOC questions: (0)   Answers both questions correctly    LOC commands: (0)   Performs both tasks correctly    Best gaze: (0)   Normal    Visual: (0)   No visual loss    Facial palsy: (0)   Normal symmetrical movements    Motor arm (left): (0)   No drift    Motor arm (right): (0)   No drift    Motor leg (left): (0)   No drift    Motor leg (right): (0)   No drift    Limb ataxia: (2)   Present in two limbs    Sensory: (1)   Mild to moderate sensory loss    Best language: (0)   Normal- no aphasia    Dysarthria: (0)   Normal    Extinction and inattention: (0)   No abnormality        Total Score:  3     Imaging  Reviewed MRI  Brain.  CTA showed venous contamination, but arteries are open without stenosis.    Pertinent Studies    Resulted labs:  LDL: , LDL 84  A1C  4.3  ESR : 7  Troponins 0.024    Pending labs:  CANDY  PAUL  Folate and Vit B12  PTT  Factor 2 and 5  Protein S antigen  Protein C  Lupus anticoagulant  Cardiolipin antibodies  B2 glycoprotein  Factor 2 and 5 mutation analysis.  Antithromboin 3    Assessment & Plan:    Mey Kim is a 38 year old woman with hx of miscarriage, recent travel to [facility], Hx of HTN and smoking presented with left sided weakness and numbness.     ECHO pending. No stenosis noted in major vessels.  Hypercoagulable work up pending.  Continue ASA and Statin.   PT/OT/.    Omer Carlson MD  8:45 AM Jenna 3, 2018  Vascular Neurology Fellow

## 2018-06-03 NOTE — PROGRESS NOTES
06/03/18 0930   Quick Adds   Type of Visit Initial Occupational Therapy Evaluation   Living Environment   Lives With other (see comments)  (family (mom, brother, son-13 years old))   Living Arrangements mobile home   Home Accessibility bed and bath on same level;tub/shower is not walk in   Transportation Available car;family or friend will provide   Living Environment Comment family able to asist as needed    Self-Care   Dominant Hand right   Usual Activity Tolerance excellent   Current Activity Tolerance good   Regular Exercise yes   Activity/Exercise Type walking;biking   Exercise Amount/Frequency 2 times/wk   Equipment Currently Used at Home none   Functional Level Prior   Ambulation 0-->independent   Transferring 0-->independent   Toileting 0-->independent   Bathing 0-->independent   Dressing 0-->independent   Fall history within last six months no   Prior Functional Level Comment Pt independent in all I/ADLs. Pt reported pinched nerve at baseline.    General Information   Onset of Illness/Injury or Date of Surgery - Date 06/02/18   Referring Physician Nistor    Patient/Family Goals Statement Home   Additional Occupational Profile Info/Pertinent History of Current Problem Per chart: Mrs. Mey Kim is a very pleasant 38-year-old female with past medical history of hypertension (not on any medications) and tobacco use disorder who presented to [facility] ER initially for evaluation of left-sided weakness.     Precautions/Limitations fall precautions   Cognitive Status Examination   Orientation orientation to person, place and time   Level of Consciousness alert   Visual Perception   Visual Perception Wears glasses   Sensory Examination   Sensory Quick Adds (tingling in LUE and LLE )   Pain Assessment   Patient Currently in Pain Yes, see Vital Sign flowsheet  (pinched nerve on left side-baseline )   Range of Motion (ROM)   ROM Quick Adds No deficits were identified   Strength   Manual Muscle Testing Quick Adds  No deficits were identified   Hand Strength   Hand Strength Comments Pt reported decreased strength in LUE, however therapist did not observe any overt decrease in strength in LUE   Mobility   Bed Mobility Bed mobility skill: Sit to supine;Bed mobility skill: Supine to sit   Bed Mobility Skill: Sit to Supine   Level of [place]: Sit/Supine stand-by assist   Bed Mobility Skill: Supine to Sit   Level of [place]: Supine/Sit stand-by assist   Transfer Skills   Transfer Transfer Safety Analysis Bed/Chair;Transfer Skill: Stand to Sit;Transfer Safety Analysis Sit/Stand   Transfer Skill: Bed to Chair/Chair to Bed   Level of [place]: Bed to Chair contact guard   Transfer Skill: Sit to Stand   Level of [place]: Sit/Stand stand-by assist   Toilet Transfer   Toilet Transfer Toilet Transfer Skill;Toilet Transfer Safety Analysis   Transfer Skill: Toilet Transfer   Level of [place]: Toilet (modified independence)   Physical Assist/Nonphysical Assist: Toilet supervision   Lower Body Dressing   Level of [place]: Dress Lower Body stand-by assist   Instrumental Activities of Daily Living (IADL)   Previous Responsibilities meal prep;housekeeping;laundry;shopping;yardwork;finances;driving   General Therapy Interventions   Planned Therapy Interventions ADL retraining;IADL retraining;transfer training   Clinical Impression   Criteria for Skilled Therapeutic Interventions Met yes, treatment indicated   OT Diagnosis Decreased endurance for I/ADls   Influenced by the following impairments Decreased endurance for I/ADls   Assessment of Occupational Performance 1-3 Performance Deficits   Identified Performance Deficits Decreased endurance for I/ADls (dressing, bathing, toileting)   Clinical Decision Making (Complexity) Low complexity   Therapy Frequency daily   Predicted Duration of Therapy Intervention (days/wks) 3 days   Anticipated Discharge Disposition Home with Assist   Risks and Benefits of Treatment have  "been explained. Yes   Patient, Family & other staff in agreement with plan of care Yes   [facility] TM \"6 Clicks\"   2016, Trustees of [facility], under license to [employer].  All rights reserved.   6 Clicks Short Forms Daily Activity Inpatient Short Form   [facility] AM-PAC  \"6 Clicks\" Daily Activity Inpatient Short Form   1. Putting on and taking off regular lower body clothing? 3 - A Little   2. Bathing (including washing, rinsing, drying)? 3 - A Little   3. Toileting, which includes using toilet, bedpan or urinal? 3 - A Little   4. Putting on and taking off regular upper body clothing? 4 - None   5. Taking care of personal grooming such as brushing teeth? 3 - A Little   6. Eating meals? 4 - None   Daily Activity Raw Score (Score out of 24.Lower scores equate to lower levels of function) 20   Total Evaluation Time   Total Evaluation Time (Minutes) 8     "

## 2018-06-03 NOTE — PLAN OF CARE
Problem: Patient Care Overview  Goal: Plan of Care/Patient Progress Review  OT: Evaluation and treatment initaited. Pt lives in a mobile home with her mother, brother and 13 year old son. Prior pt independent in all I/ADLs.   Discharge Planner OT   Patient plan for discharge: Home  Current status: Pt ambulated to the bathroom with SBA/CGA and transferred to the toilet with modified independence. Pt demonstrated some unsteadiness while ambulating, pt reported increase in pain due to pinched nerve. Pt completed 2 groooming and hygiene tasks while standing at the sink with set up. While seated/ standing pt completed lower body dressing with SBA.   Barriers to return to prior living situation: none anticipated   Recommendations for discharge: Home with assist for I/ADLs as needed  Rationale for recommendations:  Pt reported supportive family at home that will be able to assist as needed            Entered by: Nicolle Lyon 06/03/2018 10:10 AM

## 2018-06-03 NOTE — PLAN OF CARE
"Problem: PT General Care Plan  Goal: PT Goal 1  PT Goal 1   Discharge Planner PT   Patient plan for discharge: Home  Current status: Evaluation completed, treatment initiated. 39 yo female adm with L sided UE and LE numbness, tingling and weakness for >24 hrs.  Found to have an acute lacunar infarct in the right corona radiata. Lives in a mobile home with her 14 yo son, brother and mother. Stairs to enter. States she doesn't work, no regular exercise routine.   Independent with bed mobility and transfers. CGA at gait belt, occasional Min A for balance with gait x 200' no AD. Pt demonstrates L foot drop, uses hip abduction and flexion on the L to clear the foot. Strength of the L hip ins 3+/5 vs the R 5/5. Demonstrates decreased hip extension on the L during gait as well. Able to negotiate 3 stairs with CGA, use of rail and step to pattern. C/o pain in the L hip and glute \"like a pinched nerve.\"  Barriers to return to prior living situation: Fall risk, L foot drop.  Recommendations for discharge: Home with OP PT  Rationale for recommendations: Pt will benefit from continued PT services to progress independence with gait and improve dynamic balance in the setting of LLE weakness and pain.        Entered by: Niecy Bird 06/03/2018 12:49 PM           "

## 2018-06-03 NOTE — H&P
"Admitted:     06/02/2018      DATE OF ADMISSION: 06/02/2018      PRIMARY CARE PHYSICIAN:  She does not have a primary care physician.      CHIEF COMPLAINT:  Left-sided weakness.      HISTORY OF PRESENT ILLNESS:  History of present illness has been obtained from the patient who is a good historian. I did discuss with the ER attending at [facility] also.      Mrs. Mey Kim is a very pleasant 38-year-old female with past medical history of hypertension (not on any medications) and tobacco use disorder who presented to [facility] ER initially for evaluation of left-sided weakness.  The patient stated that she was in her usual state of health until Friday morning when she woke up having some weakness on the left side of her body, both left upper and left lower extremity.  She also reports some tingling sensation in left upper and left lower extremity, described as \"her arm is sleeping.\"  She stated that she never had similar symptoms before.  She stated that the symptoms remained constant since Friday morning.  She feels that her left arm is a little bit clumsy and she is dragging her foot when she walks.  She denies any recent fevers, no headaches, no blurry vision, no slurred speech, no facial droop.  She denies any nausea, no vomiting, no chest pain, no shortness of breath, no abdominal pain, no diarrhea, no constipation, no dysuria, no leg swelling.  She did travel to [facility] recently where she stayed for 6 weeks. I think she returned back on 05/16/2018.      At [facility], she had basic blood work which showed unremarkable BMP.  CBC with mildly decreased hemoglobin of 11.2, hematocrit 33.5, normal white blood cells and platelet count, MCV was low at 58.  She had an MRI of the brain with and without contrast which showed an acute lacunar type infarct in the right corona radiata extending into the region of the posterior limb of the right internal capsule.  She also had MR of the C-spine which showed some " right-sided disk protrusion at C4 to C5 and C5 to C6.  The right-sided disk at C5 to C6 extends to the level of the right C5 to C6 neural foramen.  No left-sided disk herniation noted.  Her EKG showed normal sinus rhythm at a rate of 60 beats per minute with no ischemic changes.  Dr. Givens, the ER attending, discussed with Dr. Lara, Neurology on-call at [facility], who recommended the patient to be given aspirin and transferred to [facility] for further workup of a stroke given the patient's young age.      I saw the patient after she arrived [place] [address].  She reported feeling okay, but she was still reporting having mild weakness and tingling of her left upper and left lower extremity.      PAST MEDICAL HISTORY:   1.  Hypertension.   2.  History of anemia.   3.  Tobacco use disorder.   4.  Marijuana use.      PAST SURGICAL HISTORY:  D&C.      SOCIAL HISTORY:  The patient is a current smoker.  She reports smoking 1 pack of cigarettes every 4 or 5 days.  She reports that she smokes marijuana 4 times a week, last time was on Friday.  She also reports alcohol drinking; usually once a week, she drinks 3 drinks.  She denies other illicit drug abuse.      FAMILY HISTORY:  Reviewed with the patient. It seems that her mother seems to be in good state of health.  Her father has liver and lung cancer.  She has a brother and 1 sister, no major medical problems.  She has 2 children of 18 and 13 years of age, respectively, no major medical problems.      PRIOR TO ADMISSION MEDICATIONS: She is not taking any prescribed medications.  Apparently she is taking Tylenol p.r.n., ibuprofen p.r.n. and Artificial Tears.      ALLERGIES:  NO KNOWN DRUG ALLERGIES.      REVIEW OF SYSTEMS:  The 10-point review of systems was conducted and it was negative except for pertinent positives mentioned in history of present illness.      PHYSICAL EXAMINATION:   VITAL SIGNS:  Blood pressure is 175/101, heart  rate 80, respiratory rate 16, oxygen saturation 98% on room air and temperature 98.7.   GENERAL:  The patient is awake, alert, no acute distress at the time of my examination.   HEENT:  Normocephalic, atraumatic.  Pupils are equally round and reactive to light.  Oral mucosa is moist.   NECK:  Supple.  No cervical lymphadenopathy, no thyromegaly.   CHEST:  There is bilateral air entry, no wheezing, no rales, no crackles.   CARDIOVASCULAR:  There is normal S1, S2, regular rate and rhythm. There is a systolic murmur 2/6 intensity in precordial area.  No rubs.   ABDOMEN:  Soft, nontender, nondistended.  Bowel sounds are present.   EXTREMITIES:  There is no leg swelling, no calf tenderness, 2+ peripheral pulses are palpable.   SKIN:  Intact, no rashes, no cyanosis.   NEUROLOGIC:  The patient is awake, alert, oriented to self, place and time.  There is no facial droop.  There is very mild decreased motor strength of left upper and left lower extremity.  There is mild reported decreased sensation of left upper and lower extremity.   PSYCHIATRIC:  Normal mood, normal affect.   MUSCULOSKELETAL:  She moves all extremities freely. There are no obvious joint deformities.      LABORATORY DATA:  BMP with sodium 143, potassium 3.5, chloride 109, bicarbonate 26, BUN 10, creatinine 0.79, calcium 8.1, anion gap of 8.  Troponin 0.016.  Glucose of 90.  White blood cells of 7.6, hemoglobin 11.2, hematocrit 33.5, platelet count 299.      IMAGING:  MRI of the brain with and without contrast showed an acute lacunar type infarct in the right corona radiata extending into the region of the posterior limb of the right internal capsule.      ASSESSMENT:  Mrs. Mey Kim is a very pleasant 38-year-old female with past medical history of hypertension, tobacco use disorder and marijuana use who presented to [facility] ER for evaluation of left-sided weakness.  She was found to have an acute lacunar infarct in the right corona radiata.      PLAN:    1.  Acute lacunar infarct in the right corona radiata extending into the region of the posterior limb of the right internal capsule.  She presented with left upper and left lower extremity tingling and weakness since Friday.  She woke up with these symptoms.  She never had similar symptoms before.  She denies any other neurological deficits.  No facial droop, no slurred speech, no vision changes, no dizziness.  She does have history of hypertension, not on any medications prior to this admission.  She also is a smoker and smokes marijuana as well.  The patient was given aspirin 325 mg p.o. in [facility] and transferred to [facility] for further stroke workup given her young age.  The patient will be monitored on telemetry.  She will have frequent neuro checks q.4h.  Formal Neuro consult was requested. Will do an echocardiogram with bubble study in the morning and hypercoagulable state workup has been already ordered.  The plan is to have a CT angiogram of the head and neck tonight.  Will check fasting lipid profile, TSH, hemoglobin A1c.  Her blood pressure is elevated at this time, but will allow for permissive hypertension.  Hydralazine and labetalol IV p.r.n. has been ordered as per stroke protocol.  She will be seen by PT, OT and speech evaluation in the morning.  I will continue with aspirin 81 mg p.o. daily and I started her on Crestor.  Long-term, she will need better blood pressure control and quit smoking.   2.  Iron deficiency anemia.  Her hemoglobin is 11.2, hematocrit 33.5.  Her MCV is low at 58.  Apparently she does have a history of iron deficiency anemia, likely related to heavy vaginal bleeding.  She had a D and C in the past. Plan for now is to check iron studies as well as vitamin B12 and folic acid.   3.  C-spine disk protrusion at the level of C4 to C5 and C5 to C6 on the right side.  This is an incidental finding on MR of the C-spine.  She does not have any complaints of right  arm numbness or weakness.  She denies any neck pain.  This eventually needs to be followed up in case that she started developing symptoms.   4.  Polysubstance abuse.  She reports smoking cigarettes and marijuana. I strongly encouraged her to quit smoking both substances.  I offered nicotine patch, she deferred it at this time.   5.  DVT prophylaxis:  Pneumatic compression device.      CODE STATUS:  DISCUSSED WITH THE PATIENT AND PATIENT IS FULL CODE.      DISPOSITION:  Admit to inpatient on neuro floor as I anticipate at least 2 days of hospitalization.         JESSICA MCCORMACK MD             D: 2018   T: 2018   MT:       Name:     OMAYRA ECHEVARRIA   MRN:      [id number]        Account:      [id number]   :      1980        Admitted:     2018                   Document: [id number]

## 2018-06-03 NOTE — PLAN OF CARE
Problem: Patient Care Overview  Goal: Plan of Care/Patient Progress Review  Outcome: No Change  A&O. Neuros - left hemiparesis on upper and lower extremity, decreased sensation on left upper, grasp slightly weaker on left, no droop or tongue deviation noted, communicating her needs well, following directions. VSS. Tele NSR. Low fat/NA diet/appetite good. Voiding fine. Up with 1 assist/gait belt to bathroom. Discomfort in left groin/thigh area for past few weeks, declining tylenol. CT results pending.

## 2018-06-03 NOTE — PROGRESS NOTES
[facility]    Hospitalist Progress Note    Date of Service (when I saw the patient): 06/03/2018    Assessment & Plan   Mey Kim is a 38 year old female who was admitted on 6/2/2018 with an acute lacunar infarction in the right corona radiata extending into posterior limb of right internal capsule.    1. Acute ischemic stroke.  Neurology consult appreciated.  MRI demonstrates an acute lacunar infarct.  CTA head/neck demonstrates no dissection or stenosis.  Continue aspirin and rosuvastatin.  Echo demonstrates normal cardiac valves and function, but shadowing in aortic root, concerning for dissection.  CT angiogram of chest/abd/pelvis w/o evidence for dissection.  Hypercoaguable panel pending.  Continue PT/OT/SLP.      2. R groin pain.  Possible meralgia paresthetica.    # Pain Assessment:  Current Pain Score 6/3/2018   Patient currently in pain? yes   Pain score (0-10) -   Pain location (No Data)   Pain descriptors -   Mey carl pain level was assessed and she currently denies pain.        DVT Prophylaxis: Pneumatic Compression Devices  Code Status: Full Code    Disposition: Expected discharge in 1-2 days.    Jordan Coleman  Text Page (7 am to 6 pm)    Interval History   The patient states right arm weakness improved.  She complains of right groin pain for pain 3 weeks.    -Data reviewed today: I reviewed all new labs and imaging results over the last 24 hours. I personally reviewed no images or EKG's today.    Physical Exam   Temp: 98  F (36.7  C) Temp src: Oral BP: 137/78 Pulse: 71   Resp: 16 SpO2: 100 % O2 Device: None (Room air)    There were no vitals filed for this visit.  Vital Signs with Ranges  Temp:  [97.9  F (36.6  C)-98.7  F (37.1  C)] 98  F (36.7  C)  Pulse:  [68-71] 71  Resp:  [16] 16  BP: (135-179)/() 137/78  SpO2:  [99 %-100 %] 100 %  I/O last 3 completed shifts:  In: 900 [P.O.:100; I.V.:800]  Out: -     Gen: Well nourished, well developed, alert and oriented x 3, no  acute distressed  HEENT: Atraumatic, normocephalic  Lungs: Clear to ausculation without wheezes, rhonchi, or rales  Heart: Regular rate and rhythm, no murmurs, gallops, or rubs  GI: Bowel sound normal, no hepatosplenomegaly or masses  Lymph: No lymphadenopathy or edema  Skin: No rashes     Medications     - MEDICATION INSTRUCTIONS -       sodium chloride 100 mL/hr at 06/03/18 0818       aspirin  81 mg Oral Daily     rosuvastatin  20 mg Oral or NG Tube Daily     sodium chloride (PF)  3 mL Intracatheter Q8H       Data     Recent Labs  Lab 06/03/18  0410 06/02/18  1958 06/02/18  1950 06/02/18  1305   WBC 8.9  --   --  7.6   HGB 10.8*  --   --  11.2*   MCV 56*  --   --  58*     --   --  199   INR  --   --  1.08  --      --   --  143   POTASSIUM 3.7  --   --  3.5   CHLORIDE 109  --   --  109   CO2 24  --   --  26   BUN 8  --   --  10   CR 0.76  --   --  0.79   ANIONGAP 7  --   --  8   REMY 7.7*  --   --  8.1*   *  --   --  98   ALBUMIN 3.1*  --   --   --    PROTTOTAL 6.3*  --   --   --    BILITOTAL 0.3  --   --   --    ALKPHOS 44  --   --   --    ALT 16  --   --   --    AST 12  --   --   --    TROPI 0.024 0.016  --   --        Recent Results (from the past 24 hour(s))   MR Brain w/o & w Contrast    Narrative    MRI BRAIN WITHOUT AND WITH CONTRAST  6/2/2018 4:08 PM    HISTORY:  left sided weakness;      TECHNIQUE:  Multiplanar, multisequence MRI of the brain without and  with 5 mL Gadavist    COMPARISON: None.    FINDINGS:  Diffusion-weighted images reveal an area of restricted  diffusion in the right corona radiata. This extends into the region of  the posterior limb of the right internal capsule. This is consistent  with an acute lacunar type infarct. This measures approximately 1.4 cm  in AP dimension. It extends about 2.2 cm in cephalocaudad dimension.  No hemorrhage or mass effect  There are no gadolinium enhancing  lesions.   There is a 2 cm polyp or retention cyst in the left  maxillary sinus.  The arteries at the base of the brain and the dural  venous sinuses appear patent.       Impression    IMPRESSION:  Study is consistent with an acute lacunar type infarct in  the right corona radiata extending into the region of the posterior  limb of the right internal capsule.      BERNARD MASSEY MD   Cervical spine MRI w/o contrast    Narrative    MRI CERVICAL SPINE WITHOUT CONTRAST 6/2/2018 4:09 PM     HISTORY: left arm, leg weakness;     TECHNIQUE: Multiplanar, multisequence MRI of the cervical spine  without contrast.     COMPARISON: None.    FINDINGS: The cervical spinal cord and visualized portions of the  thoracic spinal cord appear normal.  The visualized portions of the  brainstem and cerebellum appear normal.  Alignment is normal.  The  paraspinal soft tissues appear normal. The bone marrow has normal  signal intensity.     Craniocervical Junction and C1-C2:  Normal.    C2-C3:  Normal disc, facet joints, spinal canal and neural foramina.    C3-C4:  Minimal annular disc bulge. Central canal and neural foramina  normal.    C4-C5:  Small right central disc osteophyte complex causing slight  mass effect on the dural sac. Central canal and neural foramen are  patent    C5-C6:  Small right-sided disc herniation extending into the region of  the right C5-6 neural foramen. This could potentially affect the right  C6 nerve root. This is best seen on axial image 27 of series 7.    C6-C7:  Central canal and neural foramen are patent.    C7-T1: Normal disc, facet joints, spinal canal and neural foramina.    T1-T2:  Normal disc, facet joints, spinal canal and neural foramina.       Impression    IMPRESSION:    1. Right-sided disc protrusions at C4-5 and C5-6. The right-sided disc  at C5-6 extends to the level of the right C5-6 neural foramen. This  could affect the exiting right C6 nerve root.  2. No left-sided disc herniations are seen.    BERNARD MASSEY MD   CT Head Neck Angio w/o & w Contrast    Narrative    CTA ANGIOGRAM  HEAD NECK  6/2/2018 10:07 PM     HISTORY: patient with right anterior choroidal ischemia on MRI, vessel  evaluation;     TECHNIQUE:  Precontrast localizing scans were followed by CT  angiography with an injection of 70mL [id number]-370 IV contrast with  scans through the head and neck.  Images were transferred to a  separate 3-D workstation where multiplanar reformations and 3-D images  were created.  Estimates of carotid stenoses are made relative to the  distal internal carotid artery diameters except as noted. Radiation  dose for this scan was reduced using automated exposure control,  adjustment of the mA and/or kV according to patient size, or iterative  reconstruction technique.    COMPARISON: None.    FINDINGS: Estimates of stenosis at the carotid bifurcations are  relative to the distal internal carotids.    Arch: [ Normal Anatomy]    Neck:  Right Carotid:  The right common carotid artery is normal.  The right  carotid bifurcation appears normal.  The right internal carotid artery  is negative.     Left Carotid:  The left common carotid artery is unremarkable.   The  left carotid bifurcation appears normal.  The left internal carotid is  negative.      Vertebrals:  The vertebral arteries are normal.    Head:  Right Carotid:No occluded vessels are seen. .    Left Carotid:  No occluded vessels are identified. .    Basilar:  The basilar artery and its branches appear normal. Both  posterior cerebral arteries arise from the distal internal carotid  arteries. This is a normal variant.    Miscellaneous: The venous sinuses appear normal..      Impression    IMPRESSION:   1. No stenosis is seen at either carotid bifurcation.  2. No arterial dissection is identified.  3. No high-grade intracranial vascular stenosis is identified.  4. Venous sinuses appear patent    BERNARD MASSEY MD

## 2018-06-03 NOTE — PROGRESS NOTES
" 06/03/18 1154   Quick Adds   Type of Visit Initial PT Evaluation   Living Environment   Lives With child(emilia), dependent;other relative(s) (specify)  (Mother, son and brother)   Living Arrangements mobile home   Home Accessibility bed and bath on same level;tub/shower is not walk in   Number of Stairs to Enter Home 4   Number of Stairs Within Home 0   Transportation Available car;family or friend will provide   Living Environment Comment Pt states she currently does not work.   Self-Care   Dominant Hand right   Usual Activity Tolerance good   Current Activity Tolerance moderate   Regular Exercise (\"sometimes.\")   Equipment Currently Used at Home none   Functional Level Prior   Ambulation 0-->independent   Transferring 0-->independent   Toileting 0-->independent   Bathing 0-->independent   Dressing 0-->independent   Eating 0-->independent   Communication 0-->understands/communicates without difficulty   Fall history within last six months no   Which of the above functional risks had a recent onset or change? ambulation   Prior Functional Level Comment Independent with all mobiltiy and ADLs at baseline   General Information   Onset of Illness/Injury or Date of Surgery - Date 06/02/18   Referring Physician Nolvia Andino MD    Patient/Family Goals Statement None stated   Pertinent History of Current Problem (include personal factors and/or comorbidities that impact the POC) 39 yo female adm with L sided UE and LE numbness, tingling and weakness for >24 hrs.  found to have an acute lacunar infarct in the right corona radiata.    Precautions/Limitations fall precautions   Cognitive Status Examination   Orientation orientation to person, place and time   Level of Consciousness alert   Follows Commands and Answers Questions 100% of the time;able to follow multistep instructions   Personal Safety and Judgment intact   Cognitive Comment Flat affect   Pain Assessment   Patient Currently in Pain Yes, see Vital Sign " "flowsheet  (L hip and glute pt reports pain and discomfort)   Range of Motion (ROM)   ROM Comment B LEs WFL, L ankle slightly less DF vs the R. When feet are flat unable to lift the ankle, just the toes, however, when foot is in the air, there is a better range of motion (L).   Strength   Strength Comments Demonstrates R hip flexion weakness 3+/5, note pt with gait decreased L hip extension.   Bed Mobility   Bed Mobility Comments Independent   Transfer Skills   Transfer Comments Sit<>stand independent   Gait   Gait Comments CGA, L foot drop, abduction and flexion of the L hipused to componsate and clear the L foot.   Balance   Balance Comments Static stance no increase in sway, dynamic mobility pt occasionally reaches for wall for support   Sensory Examination   Sensory Perception Comments Reports n/t in L UE, L LE    Coordination   Coordination Comments Difficutly wtih placement of the LLE with walking, impaired strength and/or proproreception.   General Therapy Interventions   Planned Therapy Interventions balance training;bed mobility training;gait training;neuromuscular re-education;ROM;strengthening;stretching;transfer training;progressive activity/exercise   Clinical Impression   Criteria for Skilled Therapeutic Intervention yes, treatment indicated   PT Diagnosis Impaired gait   Influenced by the following impairments Decreased balance, impaired L DF strength   Functional limitations due to impairments Decreased funcitonal independence   Clinical Presentation Stable/Uncomplicated   Clinical Presentation Rationale see MR   Clinical Decision Making (Complexity) Low complexity   Therapy Frequency` daily   Predicted Duration of Therapy Intervention (days/wks) 5 days   Anticipated Discharge Disposition Home with Outpatient Therapy   Risk & Benefits of therapy have been explained Yes   Patient, Family & other staff in agreement with plan of care Yes   [facility] AM-PAC  \"6 Clicks\" V.2 Basic Mobility " Inpatient Short Form   1. Turning from your back to your side while in a flat bed without using bedrails? 4 - None   2. Moving from lying on your back to sitting on the side of a flat bed without using bedrails? 4 - None   3. Moving to and from a bed to a chair (including a wheelchair)? 4 - None   4. Standing up from a chair using your arms (e.g., wheelchair, or bedside chair)? 4 - None   5. To walk in hospital room? 3 - A Little   6. Climbing 3-5 steps with a railing? 3 - A Little   Basic Mobility Raw Score (Score out of 24.Lower scores equate to lower levels of function) 22   Total Evaluation Time   Total Evaluation Time (Minutes) 8

## 2018-06-04 ENCOUNTER — APPOINTMENT (OUTPATIENT)
Dept: PHYSICAL THERAPY | Facility: CLINIC | Age: 38
DRG: 065 | End: 2018-06-04
Attending: INTERNAL MEDICINE
Payer: COMMERCIAL

## 2018-06-04 ENCOUNTER — APPOINTMENT (OUTPATIENT)
Dept: OCCUPATIONAL THERAPY | Facility: CLINIC | Age: 38
DRG: 065 | End: 2018-06-04
Attending: INTERNAL MEDICINE
Payer: COMMERCIAL

## 2018-06-04 VITALS
DIASTOLIC BLOOD PRESSURE: 82 MMHG | BODY MASS INDEX: 24.65 KG/M2 | OXYGEN SATURATION: 99 % | TEMPERATURE: 98.1 F | HEART RATE: 75 BPM | WEIGHT: 126.2 LBS | RESPIRATION RATE: 16 BRPM | SYSTOLIC BLOOD PRESSURE: 138 MMHG

## 2018-06-04 LAB
ACETAMINOPHEN QUAL: ABNORMAL
AMANTADINE: ABNORMAL
AMITRIPTYLINE QUAL: ABNORMAL
AMOXAPINE: ABNORMAL
AMPHETAMINES QUAL: ABNORMAL
AMPHETAMINES UR QL SCN: NEGATIVE
ANA SER QL IF: NEGATIVE
AT III ACT/NOR PPP CHRO: 97 % (ref 85–135)
ATROPINE: ABNORMAL
B2 GLYCOPROT1 IGG SERPL IA-ACNC: 0.7 U/ML
B2 GLYCOPROT1 IGM SERPL IA-ACNC: 1.1 U/ML
BARBITURATES UR QL: NEGATIVE
BENZODIAZ UR QL: ABNORMAL
BENZODIAZ UR QL: NEGATIVE
CAFFEINE QUAL: ABNORMAL
CANNABINOIDS UR QL SCN: ABNORMAL
CANNABINOIDS UR QL SCN: POSITIVE
CARBAMAZEPINE QUAL: ABNORMAL
CARDIOLIPIN ANTIBODY IGG: <1.6 GPL-U/ML (ref 0–19.9)
CARDIOLIPIN ANTIBODY IGM: 0.2 MPL-U/ML (ref 0–19.9)
CHLORPHENIRAMINE: ABNORMAL
CHLORPROMAZINE: ABNORMAL
CITALOPRAM QUAL: ABNORMAL
CLOMIPRAMINE QUAL: ABNORMAL
COCAINE QUAL: ABNORMAL
COCAINE UR QL: ABNORMAL
COCAINE UR QL: NEGATIVE
CODEINE QUAL: ABNORMAL
DESIPRAMINE QUAL: ABNORMAL
DEXTROMETHORPHAN: ABNORMAL
DIPHENHYDRAMINE: ABNORMAL
DOXEPIN/METABOLITE: ABNORMAL
DOXYLAMINE: ABNORMAL
ENA RNP IGG SER IA-ACNC: <0.2 AI (ref 0–0.9)
ENA SCL70 IGG SER IA-ACNC: <0.2 AI (ref 0–0.9)
ENA SM IGG SER-ACNC: <0.2 AI (ref 0–0.9)
ENA SS-A IGG SER IA-ACNC: <0.2 AI (ref 0–0.9)
ENA SS-B IGG SER IA-ACNC: <0.2 AI (ref 0–0.9)
EPHEDRINE OR PSEUDO: ABNORMAL
FENTANYL QUAL: ABNORMAL
FLUOXETINE AND METAB: ABNORMAL
HYDROCODONE QUAL: ABNORMAL
HYDROMORPHONE QUAL: ABNORMAL
IBUPROFEN QUAL: ABNORMAL
IMIPRAMINE QUAL: ABNORMAL
INTERPRETATION ECG - MUSE: NORMAL
LAMOTRIGINE QUAL: ABNORMAL
LOXAPINE: ABNORMAL
MAPROTYLINE: ABNORMAL
MDMA QUAL: ABNORMAL
MEPERIDINE QUAL: ABNORMAL
METHAMPHETAMINE: ABNORMAL
METHODONE QUAL: ABNORMAL
MORPHINE QUAL: ABNORMAL
NICOTINE: ABNORMAL
NORTRIPTYLINE QUAL: ABNORMAL
OLANZAPINE QUAL: ABNORMAL
OPIATES UR QL SCN: ABNORMAL
OPIATES UR QL SCN: NEGATIVE
OXYCODONE QUAL: ABNORMAL
PCP UR QL SCN: NEGATIVE
PENTAZOCINE: ABNORMAL
PHENCYCLIDINE QUAL: ABNORMAL
PHENMETRAZINE: ABNORMAL
PHENTERMINE: ABNORMAL
PHENYLBUTAZONE: ABNORMAL
PHENYLPROPANOLAMINE: ABNORMAL
PROPOXPHENE QUAL: ABNORMAL
PROPRANOLOL QUAL: ABNORMAL
PYRILAMINE: ABNORMAL
SALICYLATE QUAL: ABNORMAL
THEOBROMINE: ABNORMAL
TOPIRAMATE QUAL: ABNORMAL
TRIMIPRAMINE QUAL: ABNORMAL
VENLAFAXINE QUAL: ABNORMAL

## 2018-06-04 PROCEDURE — 25000132 ZZH RX MED GY IP 250 OP 250 PS 637

## 2018-06-04 PROCEDURE — 97116 GAIT TRAINING THERAPY: CPT | Mod: GP | Performed by: PHYSICAL THERAPIST

## 2018-06-04 PROCEDURE — 40000133 ZZH STATISTIC OT WARD VISIT: Performed by: OCCUPATIONAL THERAPY ASSISTANT

## 2018-06-04 PROCEDURE — 99232 SBSQ HOSP IP/OBS MODERATE 35: CPT | Performed by: PSYCHIATRY & NEUROLOGY

## 2018-06-04 PROCEDURE — 25000128 H RX IP 250 OP 636: Performed by: INTERNAL MEDICINE

## 2018-06-04 PROCEDURE — 99239 HOSP IP/OBS DSCHRG MGMT >30: CPT | Performed by: INTERNAL MEDICINE

## 2018-06-04 PROCEDURE — 97116 GAIT TRAINING THERAPY: CPT | Mod: GP

## 2018-06-04 PROCEDURE — 97110 THERAPEUTIC EXERCISES: CPT | Mod: GO | Performed by: OCCUPATIONAL THERAPY ASSISTANT

## 2018-06-04 PROCEDURE — 97535 SELF CARE MNGMENT TRAINING: CPT | Mod: GO | Performed by: OCCUPATIONAL THERAPY ASSISTANT

## 2018-06-04 PROCEDURE — 40000193 ZZH STATISTIC PT WARD VISIT

## 2018-06-04 PROCEDURE — 25000132 ZZH RX MED GY IP 250 OP 250 PS 637: Performed by: INTERNAL MEDICINE

## 2018-06-04 PROCEDURE — 40000193 ZZH STATISTIC PT WARD VISIT: Performed by: PHYSICAL THERAPIST

## 2018-06-04 PROCEDURE — 80307 DRUG TEST PRSMV CHEM ANLYZR: CPT | Performed by: INTERNAL MEDICINE

## 2018-06-04 PROCEDURE — 97110 THERAPEUTIC EXERCISES: CPT | Mod: GP | Performed by: PHYSICAL THERAPIST

## 2018-06-04 RX ORDER — LISINOPRIL 2.5 MG/1
2.5 TABLET ORAL DAILY
Qty: 30 TABLET | Refills: 3 | Status: ON HOLD | OUTPATIENT
Start: 2018-06-05 | End: 2019-01-14

## 2018-06-04 RX ORDER — ROSUVASTATIN CALCIUM 20 MG/1
20 TABLET, COATED ORAL DAILY
Qty: 30 TABLET | Refills: 3 | Status: ON HOLD | OUTPATIENT
Start: 2018-06-04 | End: 2020-03-28

## 2018-06-04 RX ORDER — LISINOPRIL 2.5 MG/1
2.5 TABLET ORAL DAILY
Status: DISCONTINUED | OUTPATIENT
Start: 2018-06-04 | End: 2018-06-04 | Stop reason: HOSPADM

## 2018-06-04 RX ADMIN — SODIUM CHLORIDE: 9 INJECTION, SOLUTION INTRAVENOUS at 03:48

## 2018-06-04 RX ADMIN — SENNOSIDES AND DOCUSATE SODIUM 1 TABLET: 8.6; 5 TABLET ORAL at 00:18

## 2018-06-04 RX ADMIN — LISINOPRIL 2.5 MG: 2.5 TABLET ORAL at 10:05

## 2018-06-04 RX ADMIN — ACETAMINOPHEN 650 MG: 325 TABLET, FILM COATED ORAL at 03:48

## 2018-06-04 RX ADMIN — ASPIRIN 81 MG: 81 TABLET, COATED ORAL at 08:50

## 2018-06-04 ASSESSMENT — VISUAL ACUITY
OU: NORMAL ACUITY

## 2018-06-04 ASSESSMENT — ACTIVITIES OF DAILY LIVING (ADL)
ADLS_ACUITY_SCORE: 7
ADLS_ACUITY_SCORE: 7
ADLS_ACUITY_SCORE: 8
ADLS_ACUITY_SCORE: 7

## 2018-06-04 NOTE — DISCHARGE INSTRUCTIONS
Your risk factors for stroke or TIA (transient ischemic attack):    Your Risk Factors Your Results Normal Ranges   High blood pressure BP Readings from Last 1 Encounters:   06/04/18 138/82    Less than 120/80   Cholesterol              Total Lab Results   Component Value Date    CHOL 144 06/03/2018      Less than 150    Triglycerides   Lab Results   Component Value Date    TRIG 81 06/03/2018    Less than 150   LDL Lab Results   Component Value Date    LDL 70 06/03/2018       Less than 70   HDL Lab Results   Component Value Date    HDL 58 06/03/2018            Greater than 40 (men)  Greater than 50 (women)   Diabetes   Recent Labs  Lab 06/03/18  0410   *    Fasting blood glucose    Smoking/tobacco use  Quit smoking and tobacco   Overweight  Lose 1-2 pounds a week   Lack of exercise  30 minutes moderate activity each day   Other risk factors include carotid (neck) artery disease, atrial fibrillation and stress. You may be on new medicine to treat high blood pressure, cholesterol, diabetes or atrial fibrillation.    Understanding Stroke Booklet given to patient. Please refer to booklet for further information.    Stroke warning signs and symptoms - CALL 911 right away for:  - Sudden numbness or weakness in the face, arm or leg (often on one side of the body).  - Sudden confusion or trouble understanding what is going on.  - Sudden blurred or decreased vision in one or both eyes.  - Sudden trouble speaking, loss of balance, dizziness or problems with coordination.  - Sudden, severe headache for no reason.  - Fainting or seizures.  - Symptoms may go away then come back suddenly.

## 2018-06-04 NOTE — PLAN OF CARE
"Problem: Patient Care Overview  Goal: Plan of Care/Patient Progress Review  Outcome: No Change  A&Ox4. Neuros - LUE/LLE hemiparesis, L droop, LUE/LLE ataxia, L pronator drift, LUE/LLE/L facial tingling. VSS. Tele NSR. LSLF/cardiac diet. Up with A1, GB. Voiding adequately. C/O 5/10 lateral L theigh \"pinching\" pain decreased with PRN Tylenol. C/O constipation, Senna x1 given. Plan for possible JOSH today. DC plan pending neuro recommendations.        "

## 2018-06-04 NOTE — PLAN OF CARE
Problem: Patient Care Overview  Goal: Plan of Care/Patient Progress Review  Physical Therapy Discharge Summary    Reason for therapy discharge:    Discharged to home with outpatient therapy.    Progress towards therapy goal(s). See goals on Care Plan in [facility] electronic health record for goal details.  Goals partially met.  Barriers to achieving goals:   discharge from facility.    Therapy recommendation(s):    Continued therapy is recommended.  Rationale/Recommendations:  Per previous PT: Patient with weakness left LE which impairs safety and balance. Would benefit from OPPT to progress her strength, independence and balance to progress her back to baseline without an AD. .

## 2018-06-04 NOTE — PLAN OF CARE
Problem: Patient Care Overview  Goal: Plan of Care/Patient Progress Review  Occupational Therapy Discharge Summary    Reason for therapy discharge:    Discharged to home.    Progress towards therapy goal(s). See goals on Care Plan in [facility] electronic health record for goal details.  Goals met    Therapy recommendation(s):    No further therapy is recommended.  Recommend home with assist for I/ADLs as needed

## 2018-06-04 NOTE — PROGRESS NOTES
Plan on discharge today with OPPT ordered. Patient requested assist in finding new PCP. PCP and Neuro appointments scheduled. DC summary and Neuro consult faxed to [facility] @ [phone]

## 2018-06-04 NOTE — PLAN OF CARE
Problem: Patient Care Overview  Goal: Plan of Care/Patient Progress Review  Outcome: Adequate for Discharge Date Met: 06/04/18  A&Ox4 . Neuros LUE/LLE hemiparesis, 4/5, ataxia, slight Left facial droop, tingling present in LUE/LLE no numbness noted. VSS. Tele NS. Cardiac, low sodium-low fat diet. Up with SBA. Denies pain. Plan to discharge home with OP PT. Medications discussed and sent home with pt, verbalized understanding and questions answered.

## 2018-06-04 NOTE — PLAN OF CARE
Problem: Patient Care Overview  Goal: Plan of Care/Patient Progress Review  Discharge Planner OT   Patient plan for discharge: Home  Current status:  Pt completed bed mobility, amb to/from bathroom,toileting, standing at sink ADLS and UE dressing independent. Educated on and provided with LUE ROM and FMC strengthening exercises which pt verbalized and demonstrated good understanding with completing.   Barriers to return to prior living situation: none anticipated   Recommendations for discharge: Home with assist for I/ADLs as needed  Rationale for recommendations:  Pt reported supportive family at home that will be able to assist as needed        Entered by: Roselia Mandujano 06/04/2018 9:37 AM     OT GOALS MET, see discharge summary

## 2018-06-04 NOTE — DISCHARGE SUMMARY
[facility]  Discharge Summary  Hospitalist    Date of Admission:  6/2/2018  Date of Discharge:  6/4/2018  Discharging Provider: Mila Jones MD    Discharge Diagnoses   Acute ischemic stroke, right corona radiata     History of Present Illness   Mey Kim is a 38 year old female who was admitted on 6/2/2018 with an acute lacunar infarction in the right corona radiata extending into posterior limb of right internal capsule. Please see admission H&P for complete details.     Hospital Course   Mey Kim was admitted on 6/2/2018.  The following problems were addressed during her hospitalization:    Acute ischemic stroke  Neurology consult obtained.  MRI demonstrates an acute lacunar infarct.  CTA head/neck demonstrates no dissection or stenosis. Echo demonstrates normal cardiac valves and function, but shadowing in aortic root, concerning for dissection.  CT angiogram of chest/abd/pelvis w/o evidence for dissection.    -Initiated on aspirin 81 mg daily  -Initiated on Rosuvastatin. Continue at discharge.  -started on low dose lisinopril 2.5 mg daily at discharge.   -outpatient PT ordered.  -Follow up in neurology clinic in 4-6 weeks.  -Hypercoaguable panel pending at discharge.     # Pain Assessment:  Current Pain Score 6/4/2018   Patient currently in pain? denies   Pain score (0-10) -   Pain location -   Pain descriptors -   Mey s pain level was assessed and she currently denies pain.      Active Problems:    CVA (cerebral vascular accident) (H)    Mila Jones MD  ~~~~~~~~~~~~~~~~~~~~~~~~~~~~~~~~~~~~~~~~~~~~~~~~~~~~~~~~~~~    Pending Results   These results will be followed up by Hospitalist.  Unresulted Labs Ordered in the Past 30 Days of this Admission     Date and Time Order Name Status Description    6/4/2018 1213 Drug abuse screen 77 urine In process     6/3/2018 0410 Anti Nuclear Namita IgG by IFA with Reflex In process     6/2/2018 1850 Factor 2 and 5 mutation analysis  In process     6/2/2018 1858 Protein S Antigen Free In process     6/2/2018 1858 Lupus Anticoagulant Panel In process     6/2/2018 1858 Beta 2 Glycoprotein 1 Antibody IgG In process     6/2/2018 1858 Beta 2 Glycoprotein 1 Antibody IgM In process     6/2/2018 1858 Protein C chromogenic In process     6/2/2018 1858 Factor 5 leiden mutation analysis In process     6/2/2018 1858 F2 prothrombin [id number] Mut Anal In process         Code Status   Full Code       Primary Care Physician   Aidee Martines    Physical Exam   Temp: 98.1  F (36.7  C) Temp src: Oral BP: 138/82 Pulse: 75   Resp: 16 SpO2: 99 % O2 Device: None (Room air)    Vitals:    06/04/18 0634   Weight: 57.2 kg (126 lb 3.2 oz)     Vital Signs with Ranges  Temp:  [97.7  F (36.5  C)-98.1  F (36.7  C)] 98.1  F (36.7  C)  Pulse:  [61-75] 75  Resp:  [16] 16  BP: (125-152)/(80-89) 138/82  SpO2:  [99 %-100 %] 99 %  I/O last 3 completed shifts:  In: 900 [P.O.:900]  Out: -     Constitutional: Alert, NAD, pleasant and cooperative    Discharge Disposition   Discharged to home  Condition at discharge: Stable    Consultations This Hospital Stay   NEUROLOGY IP CONSULT  PHYSICAL THERAPY ADULT IP CONSULT  OCCUPATIONAL THERAPY ADULT IP CONSULT  SPEECH LANGUAGE PATH ADULT IP CONSULT  SWALLOW EVAL SPEECH PATH AT BEDSIDE IP CONSULT  SMOKING CESSATION PROGRAM IP CONSULT    Time Spent on this Encounter   I, Mila Jones, personally saw the patient today and spent 35 minutes discharging this patient.    Discharge Orders     Physical Therapy Referral     Follow-up and recommended labs and tests    You have a Neurology Follow Up Appointment:  Thursday July 5. 1:45 pm  [facility]  [facility] [address] [facility], [address]  [place], [place] [id number] [phone]     Reason for your hospital stay   stroke     Follow-up and recommended labs and tests    Follow up with primary care provider, Aidee Martines, within 7 days to evaluate medication change.  No follow  up labs or test are needed.     Activity   Your activity upon discharge: activity as tolerated     Full Code     Diet   Follow this diet upon discharge:      Combination Diet Low Saturated Fat Na <2400mg Diet       Discharge Medications   Current Discharge Medication List      START taking these medications    Details   aspirin 81 MG EC tablet Take 1 tablet (81 mg) by mouth daily  Qty: 90 tablet, Refills: 3    Associated Diagnoses: Acute ischemic stroke (H)      lisinopril (PRINIVIL/ZESTRIL) 2.5 MG tablet Take 1 tablet (2.5 mg) by mouth daily  Qty: 30 tablet, Refills: 3    Associated Diagnoses: Benign essential hypertension      order for DME Equipment being ordered: Walker Wheels () and Walker ()  Treatment Diagnosis: Impaired gait.  Qty: 1 each, Refills: 0    Associated Diagnoses: S/P dilation and curettage      rosuvastatin (CRESTOR) 20 MG tablet Take 1 tablet (20 mg) by mouth daily  Qty: 30 tablet, Refills: 3    Associated Diagnoses: Acute ischemic stroke (H)         CONTINUE these medications which have NOT CHANGED    Details   ACETAMINOPHEN PO Take 500 mg by mouth every 6 hours as needed for pain      hypromellose (ARTIFICIAL TEARS) 0.5 % SOLN ophthalmic solution 1 drop every hour as needed for dry eyes         STOP taking these medications       IBUPROFEN PO Comments:   Reason for Stopping:             Allergies   No Known Allergies  Data

## 2018-06-04 NOTE — PLAN OF CARE
Problem: Patient Care Overview  Goal: Plan of Care/Patient Progress Review  Discharge Planner PT   Patient plan for discharge: Home today  Current status: Patient transfers sup to/from sit and sit to stand independently. Patient can amb without AD but tends to catch left toes which causes imbalance. Patient amb with ww and balance and safety improved with this although she does still deviate from path at times. Recommended a ww for home and patient agreeable.   Barriers to return to prior living situation: Steps to enter (able to do with just supervision with bilateral rails), decreased balance  Recommendations for discharge: Home with a ww and outpatient PT  Rationale for recommendations: Patient with weakness left LE which impairs safety and balance. Would benefit from OPPT to progress her strength, independence and balance to progress her back to baseline without an AD.        Entered by: Loreto Raphael 06/04/2018 12:33 PM

## 2018-06-04 NOTE — PROGRESS NOTES
[facility]  Neurology Progress Note  06/04/18    Patient Name: Mey Kim    Interval events:  No interval events.    Objective:  B/P: 125/80, T: 98.1, P: 61, R: 16    GENERAL: NAD, lying on bed  HEENT: NC/AT. Mucous membranes moist.  CARDIAC: RRR without  Murmur.  RESPIRATORY: Good effort. CTAB. No w/r/r appreciated.  ABDOMINAL: Soft, non tender, non distended. + BS.   EXTREMITIES: Warm, dry.     NEUROLOGIC:  MS: Alert and oriented x 4.  CN:    II - visual fields intact  III, IV, VI - EOMI, PERRL, no nystagmus noted  V - facial sensation intact and equal   VII - facial movement symmetrical  VIII - hearing intact to conversation  IX, X - uvula midline  XII - tongue midline with full ROM  MOTOR: normal tone throughout, no abnormal movements, strength 4/5 in Left UE and LE  SENSORY:dec sensation in the left UE and LE.  REFLEXES: 2+ and symmetric in patellar, biceps, and brachioradialis  COORDINATION: Left hemiataxia    [employer] Stroke Scale  Exam Interval: 6/4/18   Score    Level of consciousness: (0)   Alert, keenly responsive    LOC questions: (0)   Answers both questions correctly    LOC commands: (0)   Performs both tasks correctly    Best gaze: (0)   Normal    Visual: (0)   No visual loss    Facial palsy: (0)   Normal symmetrical movements    Motor arm (left): (0)   No drift    Motor arm (right): (0)   No drift    Motor leg (left): (0)   No drift    Motor leg (right): (0)   No drift    Limb ataxia: (2)   Present in two limbs    Sensory: (1)   Mild to moderate sensory loss    Best language: (0)   Normal- no aphasia    Dysarthria: (0)   Normal    Extinction and inattention: (0)   No abnormality        Total Score:  3     Imaging      Pertinent Studies  Resulted labs:  LDL:70, , LDL 84  A1C 4.3  ESR : 7  Troponins 0.024  b12 278  Folate 18.5    Pending labs:  CANDY  PAUL  PTT  Factor 2 and 5  Protein S antigen  Protein C  Lupus anticoagulant  Cardiolipin  antibodies  B2 glycoprotein  Factor 2 and 5 mutation analysis.  Antithromboin 3  Urine tox screen    Assessment & Plan:    Mey Kim is a 38 year old woman with hx of miscarriages, hx of HTN and smoking presented with left sided weakness and numbness.    MRI Brain showed the right striatocapsular stroke. CTA - no stenosis or occlusions in major arteries of head and neck.  ECHO did not show any PFO or other valvular abnormalities. Normal sized atria. A flap like structure in the ascending aorta which was not shown to be a dissection or aneurysm on the CT chest.  A1C 4.3, LDL 70  Continue ASA and Statin.  BP goal  < 130/85. Her blood pressure was elevated yesterday.She says that she has had HTN for 2 years and is not on any medications. Discussed with her about home blood pressure monitoring and bringing back the BP diary in her next clinic visit. Will start her on small dose of lisinopril till then.  Telemetry while inpatient.  PT/OT.  Okay to be d/c'd from Neurology stand point. Follow up in Neurology clinic in 4-6 weeks. Her pending hypercoagulable workup will be followed during that clinic visit.    Omer Carlson MD  8:55 AM June 4, 2018  Vascular Neurology Fellow.

## 2018-06-04 NOTE — PLAN OF CARE
Problem: Patient Care Overview  Goal: Plan of Care/Patient Progress Review  Outcome: No Change  A&Ox4. L facial droop. LUE/LLE hemiparesis, ataxia, and drift. N/T in LUE/LLE. Denies headache. VSS. LSLF/cardiac diet, good appetite. Teaching given for diet, pt. Brought chips and cookies from home.  Up with 1/GB, unsteady at times. NaCl at 100ml/hr in R peripheral IV. Voiding frequently and adequately. Denies pain. Plan possible JOSH tomorrow and pending neuro recommendations.

## 2018-06-05 LAB
LA PPP-IMP: NEGATIVE
PROT C ACT/NOR PPP CHRO: 75 % (ref 70–170)
PROT S FREE AG ACT/NOR PPP IA: 58 % (ref 55–125)

## 2018-06-07 LAB — COPATH REPORT: NORMAL

## 2018-06-08 LAB — INTERPRETATION ECG - MUSE: NORMAL

## 2018-06-10 ENCOUNTER — HEALTH MAINTENANCE LETTER (OUTPATIENT)
Age: 38
End: 2018-06-10

## 2018-09-13 ENCOUNTER — CARE COORDINATION (OUTPATIENT)
Dept: MEDSURG UNIT | Facility: CLINIC | Age: 38
End: 2018-09-13

## 2018-09-13 NOTE — PROGRESS NOTES
Writer attempted to reach pt via telephone x3 for 90 day post CVA mRS check in. No answer x3, therefore mRS is a 7.

## 2019-01-13 ENCOUNTER — APPOINTMENT (OUTPATIENT)
Dept: CT IMAGING | Facility: CLINIC | Age: 39
End: 2019-01-13
Payer: COMMERCIAL

## 2019-01-13 ENCOUNTER — HOSPITAL ENCOUNTER (INPATIENT)
Facility: CLINIC | Age: 39
LOS: 1 days | Discharge: HOME | End: 2019-01-14
Attending: EMERGENCY MEDICINE | Admitting: INTERNAL MEDICINE
Payer: COMMERCIAL

## 2019-01-13 DIAGNOSIS — I10 BENIGN ESSENTIAL HYPERTENSION: ICD-10-CM

## 2019-01-13 DIAGNOSIS — I63.9 ACUTE ISCHEMIC STROKE (H): ICD-10-CM

## 2019-01-13 DIAGNOSIS — K22.6 MALLORY-WEISS TEAR: Primary | ICD-10-CM

## 2019-01-13 DIAGNOSIS — K92.2 GASTROINTESTINAL HEMORRHAGE, UNSPECIFIED GASTROINTESTINAL HEMORRHAGE TYPE: ICD-10-CM

## 2019-01-13 LAB
ALBUMIN SERPL-MCNC: 3.3 G/DL (ref 3.4–5)
ALP SERPL-CCNC: 36 U/L (ref 40–150)
ALT SERPL W P-5'-P-CCNC: 25 U/L (ref 0–50)
ANION GAP SERPL CALCULATED.3IONS-SCNC: 9 MMOL/L (ref 3–14)
ANISOCYTOSIS BLD QL SMEAR: SLIGHT
AST SERPL W P-5'-P-CCNC: 16 U/L (ref 0–45)
BASOPHILS # BLD AUTO: 0 10E9/L (ref 0–0.2)
BASOPHILS NFR BLD AUTO: 0 %
BILIRUB SERPL-MCNC: 0.6 MG/DL (ref 0.2–1.3)
BUN SERPL-MCNC: 42 MG/DL (ref 7–30)
CALCIUM SERPL-MCNC: 8 MG/DL (ref 8.5–10.1)
CHLORIDE SERPL-SCNC: 111 MMOL/L (ref 94–109)
CO2 SERPL-SCNC: 22 MMOL/L (ref 20–32)
CREAT SERPL-MCNC: 0.78 MG/DL (ref 0.52–1.04)
DIFFERENTIAL METHOD BLD: ABNORMAL
EOSINOPHIL # BLD AUTO: 0 10E9/L (ref 0–0.7)
EOSINOPHIL NFR BLD AUTO: 0 %
ERYTHROCYTE [DISTWIDTH] IN BLOOD BY AUTOMATED COUNT: 16.5 % (ref 10–15)
GFR SERPL CREATININE-BSD FRML MDRD: >90 ML/MIN/{1.73_M2}
GLUCOSE SERPL-MCNC: 117 MG/DL (ref 70–99)
HCT VFR BLD AUTO: 28 % (ref 35–47)
HGB BLD-MCNC: 6.2 G/DL (ref 11.7–15.7)
HGB BLD-MCNC: 7.1 G/DL (ref 11.7–15.7)
HGB BLD-MCNC: 9.3 G/DL (ref 11.7–15.7)
HYPOCHROMIA BLD QL: PRESENT
INR PPP: 1.21 (ref 0.86–1.14)
LACTATE BLD-SCNC: 2.1 MMOL/L (ref 0.7–2)
LYMPHOCYTES # BLD AUTO: 0.6 10E9/L (ref 0.8–5.3)
LYMPHOCYTES NFR BLD AUTO: 3 %
MCH RBC QN AUTO: 19.3 PG (ref 26.5–33)
MCHC RBC AUTO-ENTMCNC: 33.2 G/DL (ref 31.5–36.5)
MCV RBC AUTO: 58 FL (ref 78–100)
MICROCYTES BLD QL SMEAR: PRESENT
MONOCYTES # BLD AUTO: 0.2 10E9/L (ref 0–1.3)
MONOCYTES NFR BLD AUTO: 1 %
NEUTROPHILS # BLD AUTO: 20.5 10E9/L (ref 1.6–8.3)
NEUTROPHILS NFR BLD AUTO: 96 %
OVALOCYTES BLD QL SMEAR: SLIGHT
PLATELET # BLD AUTO: 242 10E9/L (ref 150–450)
PLATELET # BLD EST: ABNORMAL 10*3/UL
POTASSIUM SERPL-SCNC: 4 MMOL/L (ref 3.4–5.3)
PROT SERPL-MCNC: 6.3 G/DL (ref 6.8–8.8)
RBC # BLD AUTO: 4.81 10E12/L (ref 3.8–5.2)
RBC INCLUSIONS BLD: SLIGHT
SODIUM SERPL-SCNC: 142 MMOL/L (ref 133–144)
TARGETS BLD QL SMEAR: ABNORMAL
WBC # BLD AUTO: 21.4 10E9/L (ref 4–11)

## 2019-01-13 PROCEDURE — 93005 ELECTROCARDIOGRAM TRACING: CPT

## 2019-01-13 PROCEDURE — 74177 CT ABD & PELVIS W/CONTRAST: CPT

## 2019-01-13 PROCEDURE — 25000128 H RX IP 250 OP 636: Performed by: EMERGENCY MEDICINE

## 2019-01-13 PROCEDURE — 96374 THER/PROPH/DIAG INJ IV PUSH: CPT | Mod: 59

## 2019-01-13 PROCEDURE — 99223 1ST HOSP IP/OBS HIGH 75: CPT | Mod: AI | Performed by: INTERNAL MEDICINE

## 2019-01-13 PROCEDURE — 86923 COMPATIBILITY TEST ELECTRIC: CPT | Performed by: EMERGENCY MEDICINE

## 2019-01-13 PROCEDURE — 12000000 ZZH R&B MED SURG/OB

## 2019-01-13 PROCEDURE — 86850 RBC ANTIBODY SCREEN: CPT | Performed by: EMERGENCY MEDICINE

## 2019-01-13 PROCEDURE — 96361 HYDRATE IV INFUSION ADD-ON: CPT

## 2019-01-13 PROCEDURE — 25000132 ZZH RX MED GY IP 250 OP 250 PS 637: Performed by: INTERNAL MEDICINE

## 2019-01-13 PROCEDURE — 80053 COMPREHEN METABOLIC PANEL: CPT | Performed by: EMERGENCY MEDICINE

## 2019-01-13 PROCEDURE — C9113 INJ PANTOPRAZOLE SODIUM, VIA: HCPCS | Performed by: INTERNAL MEDICINE

## 2019-01-13 PROCEDURE — C9113 INJ PANTOPRAZOLE SODIUM, VIA: HCPCS | Performed by: EMERGENCY MEDICINE

## 2019-01-13 PROCEDURE — 86900 BLOOD TYPING SEROLOGIC ABO: CPT | Performed by: EMERGENCY MEDICINE

## 2019-01-13 PROCEDURE — 85610 PROTHROMBIN TIME: CPT | Performed by: EMERGENCY MEDICINE

## 2019-01-13 PROCEDURE — 25000125 ZZHC RX 250: Performed by: INTERNAL MEDICINE

## 2019-01-13 PROCEDURE — 86901 BLOOD TYPING SEROLOGIC RH(D): CPT | Performed by: EMERGENCY MEDICINE

## 2019-01-13 PROCEDURE — 25000128 H RX IP 250 OP 636: Performed by: INTERNAL MEDICINE

## 2019-01-13 PROCEDURE — 96375 TX/PRO/DX INJ NEW DRUG ADDON: CPT

## 2019-01-13 PROCEDURE — 43255 EGD CONTROL BLEEDING ANY: CPT | Performed by: INTERNAL MEDICINE

## 2019-01-13 PROCEDURE — 36415 COLL VENOUS BLD VENIPUNCTURE: CPT | Performed by: INTERNAL MEDICINE

## 2019-01-13 PROCEDURE — 99285 EMERGENCY DEPT VISIT HI MDM: CPT | Mod: 25

## 2019-01-13 PROCEDURE — 83605 ASSAY OF LACTIC ACID: CPT | Performed by: EMERGENCY MEDICINE

## 2019-01-13 PROCEDURE — 85025 COMPLETE CBC W/AUTO DIFF WBC: CPT | Performed by: EMERGENCY MEDICINE

## 2019-01-13 PROCEDURE — 0W3P8ZZ CONTROL BLEEDING IN GASTROINTESTINAL TRACT, VIA NATURAL OR ARTIFICIAL OPENING ENDOSCOPIC: ICD-10-PCS | Performed by: INTERNAL MEDICINE

## 2019-01-13 PROCEDURE — 40000104 ZZH STATISTIC MODERATE SEDATION < 10 MIN: Performed by: INTERNAL MEDICINE

## 2019-01-13 PROCEDURE — 85018 HEMOGLOBIN: CPT | Performed by: INTERNAL MEDICINE

## 2019-01-13 RX ORDER — AMOXICILLIN 250 MG
2 CAPSULE ORAL 2 TIMES DAILY
Status: DISCONTINUED | OUTPATIENT
Start: 2019-01-13 | End: 2019-01-14 | Stop reason: HOSPADM

## 2019-01-13 RX ORDER — MORPHINE SULFATE 4 MG/ML
4 INJECTION, SOLUTION INTRAMUSCULAR; INTRAVENOUS
Status: DISCONTINUED | OUTPATIENT
Start: 2019-01-13 | End: 2019-01-13

## 2019-01-13 RX ORDER — ONDANSETRON 2 MG/ML
4 INJECTION INTRAMUSCULAR; INTRAVENOUS EVERY 6 HOURS PRN
Status: DISCONTINUED | OUTPATIENT
Start: 2019-01-13 | End: 2019-01-14 | Stop reason: HOSPADM

## 2019-01-13 RX ORDER — FENTANYL CITRATE 50 UG/ML
INJECTION, SOLUTION INTRAMUSCULAR; INTRAVENOUS PRN
Status: DISCONTINUED | OUTPATIENT
Start: 2019-01-13 | End: 2019-01-13 | Stop reason: HOSPADM

## 2019-01-13 RX ORDER — NALOXONE HYDROCHLORIDE 0.4 MG/ML
.1-.4 INJECTION, SOLUTION INTRAMUSCULAR; INTRAVENOUS; SUBCUTANEOUS
Status: DISCONTINUED | OUTPATIENT
Start: 2019-01-13 | End: 2019-01-14 | Stop reason: HOSPADM

## 2019-01-13 RX ORDER — SODIUM CHLORIDE 9 MG/ML
INJECTION, SOLUTION INTRAVENOUS CONTINUOUS
Status: DISCONTINUED | OUTPATIENT
Start: 2019-01-13 | End: 2019-01-14

## 2019-01-13 RX ORDER — IOPAMIDOL 755 MG/ML
500 INJECTION, SOLUTION INTRAVASCULAR ONCE
Status: COMPLETED | OUTPATIENT
Start: 2019-01-13 | End: 2019-01-13

## 2019-01-13 RX ORDER — BISACODYL 5 MG
5 TABLET, DELAYED RELEASE (ENTERIC COATED) ORAL DAILY PRN
Status: DISCONTINUED | OUTPATIENT
Start: 2019-01-13 | End: 2019-01-14 | Stop reason: HOSPADM

## 2019-01-13 RX ORDER — ONDANSETRON 2 MG/ML
4 INJECTION INTRAMUSCULAR; INTRAVENOUS EVERY 30 MIN PRN
Status: DISCONTINUED | OUTPATIENT
Start: 2019-01-13 | End: 2019-01-13

## 2019-01-13 RX ORDER — BISACODYL 5 MG
10 TABLET, DELAYED RELEASE (ENTERIC COATED) ORAL DAILY PRN
Status: DISCONTINUED | OUTPATIENT
Start: 2019-01-13 | End: 2019-01-14 | Stop reason: HOSPADM

## 2019-01-13 RX ORDER — ACETAMINOPHEN 325 MG/1
650 TABLET ORAL EVERY 4 HOURS PRN
Status: DISCONTINUED | OUTPATIENT
Start: 2019-01-13 | End: 2019-01-14 | Stop reason: HOSPADM

## 2019-01-13 RX ORDER — BISACODYL 5 MG
15 TABLET, DELAYED RELEASE (ENTERIC COATED) ORAL DAILY PRN
Status: DISCONTINUED | OUTPATIENT
Start: 2019-01-13 | End: 2019-01-14 | Stop reason: HOSPADM

## 2019-01-13 RX ORDER — SODIUM CHLORIDE 9 MG/ML
INJECTION, SOLUTION INTRAVENOUS CONTINUOUS
Status: DISCONTINUED | OUTPATIENT
Start: 2019-01-13 | End: 2019-01-13

## 2019-01-13 RX ORDER — AMOXICILLIN 250 MG
1 CAPSULE ORAL 2 TIMES DAILY
Status: DISCONTINUED | OUTPATIENT
Start: 2019-01-13 | End: 2019-01-14 | Stop reason: HOSPADM

## 2019-01-13 RX ORDER — DIPHENHYDRAMINE HYDROCHLORIDE 50 MG/ML
INJECTION INTRAMUSCULAR; INTRAVENOUS PRN
Status: DISCONTINUED | OUTPATIENT
Start: 2019-01-13 | End: 2019-01-13 | Stop reason: HOSPADM

## 2019-01-13 RX ORDER — LIDOCAINE 40 MG/G
CREAM TOPICAL
Status: DISCONTINUED | OUTPATIENT
Start: 2019-01-13 | End: 2019-01-13 | Stop reason: HOSPADM

## 2019-01-13 RX ORDER — ONDANSETRON 4 MG/1
4 TABLET, ORALLY DISINTEGRATING ORAL EVERY 6 HOURS PRN
Status: DISCONTINUED | OUTPATIENT
Start: 2019-01-13 | End: 2019-01-14 | Stop reason: HOSPADM

## 2019-01-13 RX ADMIN — SENNOSIDES AND DOCUSATE SODIUM 2 TABLET: 8.6; 5 TABLET ORAL at 21:28

## 2019-01-13 RX ADMIN — SODIUM CHLORIDE: 9 INJECTION, SOLUTION INTRAVENOUS at 11:15

## 2019-01-13 RX ADMIN — SODIUM CHLORIDE 1000 ML: 9 INJECTION, SOLUTION INTRAVENOUS at 07:22

## 2019-01-13 RX ADMIN — PANTOPRAZOLE SODIUM 40 MG: 40 INJECTION, POWDER, FOR SOLUTION INTRAVENOUS at 07:24

## 2019-01-13 RX ADMIN — SODIUM CHLORIDE 1000 ML: 9 INJECTION, SOLUTION INTRAVENOUS at 07:13

## 2019-01-13 RX ADMIN — PANTOPRAZOLE SODIUM 40 MG: 40 INJECTION, POWDER, FOR SOLUTION INTRAVENOUS at 21:26

## 2019-01-13 RX ADMIN — ONDANSETRON 4 MG: 2 INJECTION INTRAMUSCULAR; INTRAVENOUS at 07:13

## 2019-01-13 RX ADMIN — SODIUM CHLORIDE 53 ML: 9 INJECTION, SOLUTION INTRAVENOUS at 07:55

## 2019-01-13 RX ADMIN — IOPAMIDOL 62 ML: 755 INJECTION, SOLUTION INTRAVENOUS at 07:55

## 2019-01-13 ASSESSMENT — ENCOUNTER SYMPTOMS
VOMITING: 1
LIGHT-HEADEDNESS: 1
DIARRHEA: 1
NAUSEA: 1
BLOOD IN STOOL: 1

## 2019-01-13 ASSESSMENT — ACTIVITIES OF DAILY LIVING (ADL)
ADLS_ACUITY_SCORE: 11
ADLS_ACUITY_SCORE: 11

## 2019-01-13 ASSESSMENT — MIFFLIN-ST. JEOR: SCORE: 1193

## 2019-01-13 NOTE — PLAN OF CARE
Ambulatory Status:  Pt up independently.  VS:  VSS  Pain:  denies  Resp: LS CTA  GI:  Denies nausea.  NPO.  BS hypo.  Passing flatus.  Last BM today.  :  voiding without difficulty per patient  Skin:  WDL  Tx:  NPO IVF EGD  Labs:  hemoglobin 9.3 checking q8h  Consults:  GI  Disposition:  TBD    Will continue to monitor and provide supportive cares.

## 2019-01-13 NOTE — ED PROVIDER NOTES
History     Chief Complaint:  Hematemesis     HPI   Mey Kim is a 38 year old female who presents to the Emergency Department today for evaluation of vomiting blood. The patient reports she had abdominal pain and went to the bathroom where she had an episode of dark red blood in her diarrhea. She then vomited blood twice. She reports there were chunks of blood. She is nauseous and lightheaded. She also reports she has had a cough for a couple months.     Allergies:  No known drug allergies    Medications:    Aspirin 81 MG  Lisinopril  Crestor     Past Medical History:    High cholesterol  Hypertension  CVA    Past Surgical History:    Dilation and curettage    Family History:    History reviewed. No pertinent family history.     Social History:  Smoking status: Current every day smoker  Alcohol use: Yes  Marital Status:  Single [1]     Review of Systems   Gastrointestinal: Positive for blood in stool, diarrhea, nausea and vomiting.   Neurological: Positive for light-headedness.   All other systems reviewed and are negative.      Physical Exam     Patient Vitals for the past 24 hrs:   BP Temp Temp src Pulse Heart Rate Resp SpO2 Height Weight   01/13/19 1025 117/69 96.1  F (35.6  C) Oral -- 114 20 100 % 1.524 m (5') 58.7 kg (129 lb 4.8 oz)   01/13/19 0930 110/63 -- -- 101 108 21 100 % -- --   01/13/19 0915 109/56 -- -- 101 102 21 100 % -- --   01/13/19 0900 118/69 -- -- 105 102 20 100 % -- --   01/13/19 0845 133/62 -- -- 114 122 20 95 % -- --   01/13/19 0830 (!) 92/31 -- -- 122 110 16 100 % -- --   01/13/19 0815 99/49 -- -- 103 108 21 100 % -- --   01/13/19 0745 113/66 -- -- -- -- -- 100 % -- --   01/13/19 0730 114/59 -- -- -- -- -- -- -- --   01/13/19 0645 113/70 97  F (36.1  C) Temporal 128 -- 20 100 % -- --       Physical Exam   Constitutional: She appears well-developed and well-nourished.   HENT:   Right Ear: External ear normal.   Left Ear: External ear normal.   Mouth/Throat: Oropharynx is clear and  moist. No oropharyngeal exudate.   TM's clear bilaterally   Eyes: Conjunctivae are normal. Pupils are equal, round, and reactive to light. No scleral icterus.   Neck: Normal range of motion. Neck supple.   Cardiovascular: Regular rhythm, normal heart sounds and intact distal pulses. Exam reveals no gallop and no friction rub.   No murmur heard.  tachycardic   Pulmonary/Chest: Effort normal and breath sounds normal. No respiratory distress. She has no wheezes. She has no rales.   Abdominal: Soft. Bowel sounds are normal. She exhibits no distension and no mass. There is tenderness.   Mild epigastric TTP   Genitourinary:   Genitourinary Comments: Dark red blood on rectal exam. No black stool seen   Musculoskeletal: Normal range of motion. She exhibits no edema.   Neurological: She is alert.   Skin: Skin is warm and dry. No rash noted.   Psychiatric: She has a normal mood and affect.       Emergency Department Course     ECG (7:25:25):  Rate 109 bpm. [place] interval 146. QRS duration 72. QT/QTc 380/511. P-R-T axes 44 66 214. Sinus tachycardia. ST & T wave abnormality, consider inferior ischemia. ST & T wave abnormality, consider anterolateral ischemia. Abnormal ECG. Interpreted at 0725 by Ella Gonzalez MD.    Imaging:  Radiographic findings were communicated with the patient who voiced understanding of the findings.  CT Abdomen Pelvis w Contrast  IMPRESSION: No acute process demonstrated within the abdomen and  pelvis. As read by radiology.    Laboratory:  CBC: WBC 21.4 (H), HGB 9.3 (L), o/w WNL ()  CMP: Chloride 111 (H), Glucose 117 (H), Urea nitrogen 42 (H), Calcium 8.0 (L), Albumin 3.3 (L), Protein total 6.3 (L),Alkaline phosphate 36 (L) o/w WNL (Creatinine 0.78)  ABO/Rh type and screen: O positive antibody negative    INR: 1.21 (H)    0712: Lactic acid whole blood: 2.1 (H)    Interventions:  0713: Zofran 4 MG IV  0722: NS 1L IV Bolus  0724: Protonix 40 MG IV    Emergency Department Course:  Past medical records,  nursing notes, and vitals reviewed.  0653: I performed an exam of the patient and obtained history, as documented above.    IV inserted and blood drawn.    The patient was sent for a CT of her abdomen and pelvis while in the emergency department, findings above.    0738: I performed a chaperoned rectal exam of the patient.    0840: I rechecked the patient. Explained findings to the patient.    Findings and plan explained to the Patient who consents to admission.     0852: Discussed the patient with Dr. Schmidt, who will admit the patient to a [facility] bed for further monitoring, evaluation, and treatment.     0911: I rechecked the patient. Explained findings to the patient.     0913: I spoke with Dr. Aquino of Gastroenterology regarding this patient.     0929: I spoke with Dr. Schmidt of the hospital services regarding this patient.    Impression & Plan        Medical Decision Making:  Patient is a 38 year old female who presents with a hematemesis. Patient was initially tachycardic. She did have dark red blood on rectal exam but she was not having active bleeding here in the ER. She is on Aspirin and no other blood thinners. I suspect likely upper GI bleed which is probably bleeding briskly causing a lower GI bleed. She had some mild abdominal pain. CT was performed and was negative. Hemoglobin was 9.3 here. It is certainly lower than her baseline but not grossly low. She did consent to transfusion if needed. She was given Protonix and Zofran. She is now feeling better. She had no recurrent bleeding here. Her tachycardia did come down into normal range between 100-105. Blood pressure has been stable. She does need to be evaluated by GI. She was kept NPO here. I did talk to Dr. Aquino of GI on call and he will perform EGD on her today. She is admitted to Dr. Schmidt of the hospital service on [facility] tele awaiting further evaluation.     Diagnosis:    ICD-10-CM   1. Gastrointestinal hemorrhage, unspecified gastrointestinal hemorrhage  type K92.2     Disposition:  Admitted    Liliane Tsai  1/13/2019   [facility] EMERGENCY DEPARTMENT  Scribe Disclosure:  I, Liliane Tsai, am serving as a scribe at 6:53 AM on 1/13/2019 to document services personally performed by Ella Gonzalez MD based on my observations and the provider's statements to me.        Ella Gonzalez MD  01/13/19 5707

## 2019-01-13 NOTE — ED NOTES
[facility]  ED Nurse Handoff Report    Mey Kim is a 38 year old female   ED Chief complaint: coughing up blood  . ED Diagnosis:   Final diagnoses:   Gastrointestinal hemorrhage, unspecified gastrointestinal hemorrhage type     Allergies: No Known Allergies    Code Status: Full Code  Activity level - Baseline/Home:  Independent. Activity Level - Current:   Independent. Lift room needed: No. Bariatric: No   Needed: No   Isolation: No. Infection: Not Applicable.     Vital Signs:   Vitals:    01/13/19 0830 01/13/19 0845 01/13/19 0900 01/13/19 0915   BP: (!) 92/31 133/62 118/69 109/56   Pulse: 122 114 105 101   Resp: 16 20 20 21   Temp:       TempSrc:       SpO2: 100% 95% 100% 100%       Cardiac Rhythm:  ,      Pain level:    Patient confused: No. Patient Falls Risk: Yes.   Elimination Status: Has voided   Patient Report - Initial Complaint: vomiting blood and bloody stools since 0200. Focused Assessment: states she began vomiting blood and having bloody stools around 0200.  States she only has abdominal pain when she needs to have a bowel movement.  Skin pale, warm, dry.  Respirations even, unlabored.  Ambulatory without assistance to restroom multiple times.   Tests Performed: labs, CT, orthostatics. Abnormal Results:   Labs Ordered and Resulted from Time of ED Arrival Up to the Time of Departure from the ED   CBC WITH PLATELETS DIFFERENTIAL - Abnormal; Notable for the following components:       Result Value    WBC 21.4 (*)     Hemoglobin 9.3 (*)     Hematocrit 28.0 (*)     MCV 58 (*)     MCH 19.3 (*)     RDW 16.5 (*)     Absolute Neutrophil 20.5 (*)     Absolute Lymphocytes 0.6 (*)     All other components within normal limits   COMPREHENSIVE METABOLIC PANEL - Abnormal; Notable for the following components:    Chloride 111 (*)     Glucose 117 (*)     Urea Nitrogen 42 (*)     Calcium 8.0 (*)     Albumin 3.3 (*)     Protein Total 6.3 (*)     Alkaline Phosphatase 36 (*)     All other  components within normal limits   INR - Abnormal; Notable for the following components:    INR 1.21 (*)     All other components within normal limits   LACTIC ACID WHOLE BLOOD - Abnormal; Notable for the following components:    Lactic Acid 2.1 (*)     All other components within normal limits   CARDIAC CONTINUOUS MONITORING   PULSE OXIMETRY NURSING   ORTHOSTATIC BLOOD PRESSURE AND PULSE   PERIPHERAL IV CATHETER   PERIPHERAL IV CATHETER   FREE WATER   ABO/RH TYPE AND SCREEN   .   Treatments provided: fluid boluses, medications  Family Comments: no family with patient, drove herself here  OBS brochure/video discussed/provided to patient:  N/A  ED Medications:   Medications   0.9% sodium chloride BOLUS (0 mLs Intravenous Stopped 1/13/19 0809)     Followed by   0.9% sodium chloride BOLUS (1,000 mLs Intravenous New Bag 1/13/19 0722)     Followed by   sodium chloride 0.9% infusion (not administered)   ondansetron (ZOFRAN) injection 4 mg (4 mg Intravenous Given 1/13/19 0713)   morphine (PF) injection 4 mg (not administered)   pantoprazole (PROTONIX) 40 mg IV push injection (40 mg Intravenous Given 1/13/19 0724)   0.9% sodium chloride BOLUS (0 mLs Intravenous Stopped 1/13/19 0759)   iopamidol (ISOVUE-370) solution 500 mL (62 mLs Intravenous Given 1/13/19 0755)     Drips infusing:  No  For the majority of the shift, the patient's behavior Green. Interventions performed were n/a.     Severe Sepsis OR Septic Shock Diagnosis Present: No      ED Nurse Name/Phone Number: Ojjenny CHI Mackenzie,   9:25 AM    RECEIVING UNIT ED HANDOFF REVIEW    Above ED Nurse Handoff Report was reviewed: Yes  Reviewed by: Nettie Villarreal on January 13, 2019 at 9:56 AM

## 2019-01-13 NOTE — CONSULTS
Consult Date:  01/13/2019      GASTROENTEROLOGY CONSULTATION      REFERRING PHYSICIAN:  Ella Gonzalez MD      Arik Kim is a 38-year-old female whom we are asked to see in consultation today at the request of Dr. Gonzalez for GI bleeding.  The patient has actually had a cough for most of the past month.  She was coughing yesterday and early this morning and then went to the bathroom and the next thing she knew she was vomiting dark red blood.  She later passed a small stool with dark material and some reddish blood.  The patient has had no abdominal pain, no nausea or vomiting, no heartburn or regurgitation, no dysphagia or odynophagia.  In general, her appetite has been good with no weight loss.  She has never had previous GI bleeding.  She does take a low-dose aspirin daily, no nonsteroidal anti-inflammatories.  She has never had an endoscopy before.      ALLERGIES:  AS NOTED PER THE CHART.      MEDICATIONS:  As noted per the chart.      PAST MEDICAL HISTORY:  Significant for hypercholesterolemia, hypertension, and a CVA.      HISTORY, SURGICAL HISTORY:  Significant for dilatation and curettage.      FAMILY HISTORY:  Negative for GI malignancy or inflammatory bowel disease.      SOCIAL HISTORY:  The patient smokes daily.  She is single.  She does drink alcohol socially.      REVIEW OF SYSTEMS:  The patient denies eye pain, eye redness, arthritis, arthralgias, or skin rash.  A 10-point review of systems is otherwise negative.      PHYSICAL EXAMINATION:   GENERAL:  Reveals an alert, oriented female in no acute distress.   VITAL SIGNS:  Blood pressure is 120/80, pulse 80 and regular, respirations 15 per minute.   HEAD AND NECK:  Normocephalic, atraumatic.  Sclerae are white.  Conjunctivae pink.  Nose and throat clear.   NECK:  Supple without JVD or thyromegaly.   CHEST:  Clear to auscultation and percussion.   HEART:  S1, S2 normal, no S3, S4, or murmurs.   ABDOMEN:  Soft, nontender abdomen.  No hepatosplenomegaly  or masses.   EXTREMITIES:  No clubbing, cyanosis, or edema.   SKIN:  No skin rash.   NEUROLOGIC:  No focal neurological findings.   BREASTS:  Deferred.   RECTAL:  Deferred.   PSYCHIATRIC:  Normal mood and affect.      LABORATORY:  As noted per the chart.  INR was 1.2.  Hemoglobin was 9.3.  White count 21.4.      IMPRESSION AND PLAN:   1.  Gastrointestinal bleeding, etiology unclear.  The patient does take a low-dose aspirin and could have peptic ulcer disease, gastritis, or esophagitis.  I suspect that the bleed is obviously a foregut bleed since the patient did vomit blood and we will proceed with urgent endoscopy.  The patient will be resuscitated as needed and started on PPI therapy.  Additional recommendations will follow the exam.     2.  Leukocytosis, etiology unclear.  The patient will be pancultured there is concern for aspiration pneumonia.         NAN DIETZ MD             D: 2019   T: 2019   MT: MS      Name:     OMAYRA ECHEVARRIA   MRN:      [id number]        Account:       [id number]   :      1980           Consult Date:  2019      Document: [id number]       cc: [facility]

## 2019-01-13 NOTE — ED TRIAGE NOTES
Coughing for 1 month and this AM coughing up blood. And blood chunks.    Pt A&O x 3, CMS x 3, ABCD's adequate in triage

## 2019-01-13 NOTE — PHARMACY-ADMISSION MEDICATION HISTORY
Admission medication history interview status for this patient is complete. See [facility] admission navigator for allergy information, prior to admission medications and immunization status.     Medication history interview source(s):Patient  Medication history resources (including written lists, pill bottles, clinic record):None  Primary pharmacy: Not ID'd    Changes made to PTA medication list:  Added: None  Deleted: None  Changed: None    Actions taken by pharmacist (provider contacted, etc):None     Additional medication history information:None    Medication reconciliation/reorder completed by provider prior to medication history? No    Prior to Admission medications    Medication Sig Last Dose Taking? Auth Provider   aspirin 81 MG EC tablet Take 1 tablet (81 mg) by mouth daily 1/13/2019 at AM Yes Mila Jones MD   lisinopril (PRINIVIL/ZESTRIL) 2.5 MG tablet Take 1 tablet (2.5 mg) by mouth daily 1/13/2019 at AM Yes Mila Jones MD   rosuvastatin (CRESTOR) 20 MG tablet Take 1 tablet (20 mg) by mouth daily 1/13/2019 at AM Yes Mila Jones MD

## 2019-01-13 NOTE — OR NURSING
Md spoken to pt prior to transfer back to her pcu ,pt denies pain ,full report given to Nancy natarajan pt pcurn

## 2019-01-13 NOTE — PROGRESS NOTES
GI addendum:    I just noticed that the patient is on Lisinopril which could be causing her chronic cough - we should switch this med.    Nas Aquino MD

## 2019-01-13 NOTE — PROCEDURES
GI brief procedure note (see Provation note):    Esophagus - Diamond-Drew tear across the GEJ; not actively bleeding; clipped  Stomach - normal  Duodenum - normal    Plan:  PPI  Start full liquids    Nas Aquino MD

## 2019-01-13 NOTE — ED PROVIDER NOTES
"  History     Chief Complaint:  Hemoptysis    HPI   Mey Kim is a 38 year old female with a history of *** who presents for evaluation of ***        Patient denies ***, or other complaint.       Allergies:  No known drug allergies   No Known Allergies     Medications:    Aspirin 81 mg  Lisinopril  Crestor     aspirin 81 MG EC tablet   lisinopril (PRINIVIL/ZESTRIL) 2.5 MG tablet   rosuvastatin (CRESTOR) 20 MG tablet     Past Medical History:    CVA  S/p dilation and curettage  Hypercholesterolemia  Hypertension     Past Surgical History:    Dilation and curettage suction w/ ultrasound guidance    Family History:    History reviewed. No pertinent family history.      Social History:  Presents ***   Tobacco use: Current every day smoker (*** ppd)   Alcohol use: Yes  Drug use: Marijuana  PCP: Aidee Martines    Marital Status:  Single      Review of Systems  ***    Physical Exam     Patient Vitals for the past 24 hrs:   BP Temp Temp src Pulse Resp SpO2   01/13/19 0645 113/70 97  F (36.1  C) Temporal 128 20 100 %        Physical Exam  ***    Emergency Department Course   ECG:  ***    Imaging:  {Radiographic findings?:[id number]::\" \"}  ***    Laboratory:  ***    Procedures:  ***        Interventions:  Medications - No data to display  ***   {Response to meds:275450::\" \"}    Emergency Department Course:  Past medical records, nursing notes, and vitals reviewed.  ***: I performed an exam of the patient and obtained history, as documented above.    Above interventions provided.    ***: I rechecked the patient. Findings and plan explained to the {PATIENT, FAMILY MEMBER, CAREGIVER:611137}. Patient discharged home with instructions regarding supportive care, medications, and reasons to return. The importance of close follow-up was reviewed.        Impression & Plan       {trauma activation?:790324::\" \"}  CMS Diagnoses: {Sepsis/Septic Shock/Stemi/Stroke:589216::\" \"}       Medical Decision Making:  ***  Critical " "Care time:  {none or minutes:137578::\"none\"}    Diagnosis:  No diagnosis found.    Disposition:  {discharged to home/discharged to home with.../Admitted to...:345461}    Discharge Medications:     Medication List      There are no discharge medications for this visit.           Adi Pena  1/13/2019   [facility] EMERGENCY DEPARTMENT    "

## 2019-01-13 NOTE — H&P
History and Physical     Mey Kim MRN# [id number]   YOB: 1980 Age: 38 year old      Date of Admission:  1/13/2019    Primary care provider: [facility], [place]          Assessment and Plan:   Summary of Stay: Mey Kim is a 38 year old female with a history of ischemic stroke of the corona radiata in June 2018 managed low-dose aspirin, ACE, statin admitted on 1/13/2019 with single episode of hematemesis followed by a bloody stool concerning for an upper GI bleed.  She reports episodic queasiness in his improved eating the past several months but it is relatively infrequent.  She denies any anticoagulants, NSAIDs, but does like spicy food and drinks and smokes cigarettes on a social basis.  She has never had a GI bleed in the past, no history of ulcer.        Problem List:   1. GIB: Suspected upper, she is scheduled to undergo EGD this afternoon.  Differential includes gastritis, ulcer, potentially H. pylori.  She is low risk for anything like varices.  We will continue with twice daily IV PPI and further plans based on GI findings and recommendations  2. Leukocytosis:  Suspect stress response-recheck in am  3. ABL anemia:  hgb 9.3->7.1 after IVF.  Conditional transfusion orders in place if it drops < 7.0  4. With several month history of a dry cough: I am strongly suspicious that this represents an ACE inhibitor cough.  I recommended that she follow-up with her primary care physician to consider going off of that for a month and then resuming it to see if symptoms resolve resolve and then recur.  Obviously this is a good agent for her based on her history of a stroke at such a young age  5. History of a CVA: She had a right corona radiata stroke back in June 2018.  This is currently being managed with a baby aspirin, low-dose statin therapy, and blood pressure management with ACE inhibitor.  Obviously will hold her aspirin at least for the short-term but depending upon findings  of EGD that is should be started as soon as safely possible, and as soon as able to take p.o. we will continue both her ACE inhibitor and her statin.  She needs to f/u with neurology at discharge now that her insurance has kicked in  DVT Prophylaxis: Pneumatic Compression Devices and Ambulate every shift  Code Status: Full Code              Chief Complaint:   Hematemesis and bloody stool       History of Present Illness:    Mey Kim is a 38 year old female with a history of ischemic stroke of the corona radiata in June 2018 managed low-dose aspirin, ACE, statin admitted on 1/13/2019 with single episode of hematemesis followed by a bloody stool.    She states that she has had a dry cough for the past several months and sometimes it occurs in paroxysms causing her to gag and sometimes vomit.  She actually occasionally vomits food that she is just eaten.  She states today she had 1 of these coughing jags she thought she was vomiting up food but when he came out she saw a series of clotted blood.  She then had an episode of diarrhea stool with some bright red blood in it.  She has never had this happen to her before.  She states she has occasional queasiness that resolves with eating but she has never had an ulcer or GERD or of any been on any kind of antacids.    She denies any NSAID use that she was told told to avoid him and only takes Tylenol as needed for typical aches and pains, she drinks alcohol socially, and admits to occasionally drinking cigarettes when she is drinking.  She does like spicy food and she drinks caffeine.  She has no past history of ulcer disease that she is aware of and she is never had bloody stools or hematemesis    In childhood she did have difficulty with some episodes of epistaxis but has been greater than 20 years since any of that has occurred.  She otherwise denies any type of bleeding tendencies.    The history is obtained in discussion with the ER provider Dr. Ella Gonzalez  and the patient with excellent reliability        Past Medical History:     Past Medical History:   Diagnosis Date     CVA (cerebral vascular accident) (H) 06/2018    right corona radiata-hypecoagulable work up was negative     GIB (gastrointestinal bleeding) 01/2019     High cholesterol      Hypertension              Past Surgical History:     Past Surgical History:   Procedure Laterality Date     DILATION AND CURETTAGE SUCTION WITH ULTRASOUND GUIDANCE N/A 3/31/2017    Procedure: DILATION AND CURETTAGE SUCTION WITH ULTRASOUND GUIDANCE;  Surgeon: Cary Ascencio MD;  Location:  OR             Social History:     Social History     Tobacco Use     Smoking status: Current Every Day Smoker     Smokeless tobacco: Never Used   Substance Use Topics     Alcohol use: Yes             Family History:   I have reviewed and updated this patient's family history         Allergies:   No Known Allergies          Medications:     Medications Prior to Admission   Medication Sig Dispense Refill Last Dose     aspirin 81 MG EC tablet Take 1 tablet (81 mg) by mouth daily 90 tablet 3 1/13/2019 at AM     lisinopril (PRINIVIL/ZESTRIL) 2.5 MG tablet Take 1 tablet (2.5 mg) by mouth daily 30 tablet 3 1/13/2019 at AM     rosuvastatin (CRESTOR) 20 MG tablet Take 1 tablet (20 mg) by mouth daily 30 tablet 3 1/13/2019 at AM                 Review of Systems:   A Comprehensive greater than 10 system review of systems was carried out.  Pertinent positives and negatives are noted above.  Otherwise negative for contributory information.           Physical Exam:   Blood pressure 117/69, pulse 101, temperature 96.1  F (35.6  C), temperature source Oral, resp. rate 20, height 1.524 m (5'), weight 58.7 kg (129 lb 4.8 oz), last menstrual period 12/24/2018, SpO2 100 %.  Exam:    General:  Pleasant nad looks stated age  HEENT:  Head nc/at sclera clear PERRL O/P:  Moist mucus membranes no posterior pharyngeal erythema or exudate.  Neck is  supple  Lungs: cta b nl effort   CV:  RRR no m/r/g no le edema  Abd:  S/nt/nd no r/g  Neuro:  Cn 2-12 grossly intact and strength is intact in b ue and le  Alert and oriented affect appropriate   Skin:  W/d no c/c               Data:          Lab Results   Component Value Date     01/13/2019    Lab Results   Component Value Date    CHLORIDE 111 01/13/2019    Lab Results   Component Value Date    BUN 42 01/13/2019      Lab Results   Component Value Date    POTASSIUM 4.0 01/13/2019    Lab Results   Component Value Date    CO2 22 01/13/2019    Lab Results   Component Value Date    CR 0.78 01/13/2019        Lab Results   Component Value Date    WBC 21.4 (H) 01/13/2019    HGB 7.1 (L) 01/13/2019    HCT 28.0 (L) 01/13/2019    MCV 58 (L) 01/13/2019     01/13/2019     Lab Results   Component Value Date     (H) 01/13/2019     Lab Results   Component Value Date    AST 16 01/13/2019    ALT 25 01/13/2019    ALKPHOS 36 (L) 01/13/2019    BILITOTAL 0.6 01/13/2019     Lab Results   Component Value Date    INR 1.21 (H) 01/13/2019     Lactic Acid:  2.1  EKG:  ST with diffuse ST-T inversions.          Imaging:     Recent Results (from the past 24 hour(s))   CT Abdomen Pelvis w Contrast    Narrative    CT ABDOMEN AND PELVIS WITH CONTRAST 1/13/2019 8:00 AM     HISTORY: Abdominal pain, unspecified.    COMPARISON: [facility], 2018    TECHNIQUE: Volumetric helical acquisition of CT images from the lung  bases through the symphysis pubis after the administration of 62 mL  Isovue-370 intravenous contrast. Radiation dose for this scan was  reduced using automated exposure control, adjustment of the mA and/or  kV according to patient size, or iterative reconstruction technique.    FINDINGS: The liver, bilateral kidneys and adrenal glands, pancreas,  and spleen demonstrate no worrisome focal lesion. Normal appendix.  Unremarkable pelvic structures. Stable probable left labial  Bartholin's gland cyst. Normal caliber aorta.  Unremarkable  gallbladder. No hydronephrosis. No diverticulitis. There is no free  fluid in the abdomen or pelvis. No free air in the abdomen. Bone  windows reveal no destructive lesions.  There are no abdominal or  pelvic lymph nodes that are abnormal by size criteria. The visualized  lung bases are unremarkable. There are no dilated loops of small bowel  or colon.      Impression    IMPRESSION: No acute process demonstrated within the abdomen and  pelvis.    LALIT CLEMENTS MD

## 2019-01-14 VITALS
WEIGHT: 129.3 LBS | DIASTOLIC BLOOD PRESSURE: 53 MMHG | HEART RATE: 96 BPM | HEIGHT: 60 IN | OXYGEN SATURATION: 100 % | TEMPERATURE: 96.8 F | BODY MASS INDEX: 25.39 KG/M2 | RESPIRATION RATE: 18 BRPM | SYSTOLIC BLOOD PRESSURE: 119 MMHG

## 2019-01-14 LAB
ABO + RH BLD: NORMAL
ABO + RH BLD: NORMAL
ANION GAP SERPL CALCULATED.3IONS-SCNC: 5 MMOL/L (ref 3–14)
BLD GP AB SCN SERPL QL: NORMAL
BLD PROD TYP BPU: NORMAL
BLD PROD TYP BPU: NORMAL
BLD UNIT ID BPU: 0
BLOOD BANK CMNT PATIENT-IMP: NORMAL
BLOOD PRODUCT CODE: NORMAL
BPU ID: NORMAL
BUN SERPL-MCNC: 10 MG/DL (ref 7–30)
CALCIUM SERPL-MCNC: 7 MG/DL (ref 8.5–10.1)
CHLORIDE SERPL-SCNC: 114 MMOL/L (ref 94–109)
CO2 SERPL-SCNC: 24 MMOL/L (ref 20–32)
CREAT SERPL-MCNC: 0.7 MG/DL (ref 0.52–1.04)
ERYTHROCYTE [DISTWIDTH] IN BLOOD BY AUTOMATED COUNT: 21.7 % (ref 10–15)
GFR SERPL CREATININE-BSD FRML MDRD: >90 ML/MIN/{1.73_M2}
GLUCOSE SERPL-MCNC: 87 MG/DL (ref 70–99)
HCT VFR BLD AUTO: 24.1 % (ref 35–47)
HGB BLD-MCNC: 8.1 G/DL (ref 11.7–15.7)
INTERPRETATION ECG - MUSE: NORMAL
LACTATE BLD-SCNC: 0.6 MMOL/L (ref 0.7–2)
MCH RBC QN AUTO: 21.2 PG (ref 26.5–33)
MCHC RBC AUTO-ENTMCNC: 33.6 G/DL (ref 31.5–36.5)
MCV RBC AUTO: 63 FL (ref 78–100)
NUM BPU REQUESTED: 1
PLATELET # BLD AUTO: 128 10E9/L (ref 150–450)
POTASSIUM SERPL-SCNC: 3.8 MMOL/L (ref 3.4–5.3)
RBC # BLD AUTO: 3.82 10E12/L (ref 3.8–5.2)
SODIUM SERPL-SCNC: 143 MMOL/L (ref 133–144)
SPECIMEN EXP DATE BLD: NORMAL
TRANSFUSION STATUS PATIENT QL: NORMAL
TRANSFUSION STATUS PATIENT QL: NORMAL
UPPER GI ENDOSCOPY: NORMAL
WBC # BLD AUTO: 6.9 10E9/L (ref 4–11)

## 2019-01-14 PROCEDURE — 99239 HOSP IP/OBS DSCHRG MGMT >30: CPT | Performed by: INTERNAL MEDICINE

## 2019-01-14 PROCEDURE — 85027 COMPLETE CBC AUTOMATED: CPT | Performed by: INTERNAL MEDICINE

## 2019-01-14 PROCEDURE — 25000128 H RX IP 250 OP 636: Performed by: INTERNAL MEDICINE

## 2019-01-14 PROCEDURE — 25000132 ZZH RX MED GY IP 250 OP 250 PS 637: Performed by: INTERNAL MEDICINE

## 2019-01-14 PROCEDURE — 36415 COLL VENOUS BLD VENIPUNCTURE: CPT | Performed by: INTERNAL MEDICINE

## 2019-01-14 PROCEDURE — 83605 ASSAY OF LACTIC ACID: CPT | Performed by: INTERNAL MEDICINE

## 2019-01-14 PROCEDURE — P9016 RBC LEUKOCYTES REDUCED: HCPCS | Performed by: EMERGENCY MEDICINE

## 2019-01-14 PROCEDURE — 80048 BASIC METABOLIC PNL TOTAL CA: CPT | Performed by: INTERNAL MEDICINE

## 2019-01-14 RX ORDER — LISINOPRIL 2.5 MG/1
10 TABLET ORAL DAILY
Qty: 120 TABLET | Refills: 0
Start: 2019-01-14 | End: 2019-01-14

## 2019-01-14 RX ORDER — PANTOPRAZOLE SODIUM 40 MG/1
40 TABLET, DELAYED RELEASE ORAL 2 TIMES DAILY
Qty: 60 TABLET | Refills: 0 | Status: SHIPPED | OUTPATIENT
Start: 2019-01-14 | End: 2019-01-14

## 2019-01-14 RX ORDER — OMEPRAZOLE 40 MG/1
40 CAPSULE, DELAYED RELEASE ORAL 2 TIMES DAILY
Qty: 60 CAPSULE | Refills: 0 | Status: ON HOLD | OUTPATIENT
Start: 2019-01-14 | End: 2019-12-20

## 2019-01-14 RX ORDER — PANTOPRAZOLE SODIUM 40 MG/1
40 TABLET, DELAYED RELEASE ORAL
Status: DISCONTINUED | OUTPATIENT
Start: 2019-01-14 | End: 2019-01-14 | Stop reason: HOSPADM

## 2019-01-14 RX ADMIN — PANTOPRAZOLE SODIUM 40 MG: 40 TABLET, DELAYED RELEASE ORAL at 09:56

## 2019-01-14 RX ADMIN — SODIUM CHLORIDE: 9 INJECTION, SOLUTION INTRAVENOUS at 03:15

## 2019-01-14 ASSESSMENT — ACTIVITIES OF DAILY LIVING (ADL)
ADLS_ACUITY_SCORE: 11

## 2019-01-14 NOTE — DISCHARGE SUMMARY
Discharge Summary  Hospitalist Service    Mey Kim MRN# [id number]   YOB: 1980 Age: 38 year old     Date of Admission:  1/13/2019  Date of Discharge:  1/14/2019  Admitting Physician:  Ella Schmidt MD  Discharge Physician: Ella Schmidt MD  Discharging Service: Hospitalist Service     Primary Provider: [facility], [place]  Primary Care Physician Phone Number: [phone]         Discharge Diagnoses/Problem Oriented Hospital Course (Providers):    Mey Kim was admitted on 1/13/2019 by Ella Schmidt MD and I would refer you to their history and physical.  The following problems were addressed during her hospitalization:      ABL anemia  Diamond wellington tear  Chronic hypertension  Hyperlipidemia  Prior stroke         Code Status:      Full Code        Brief Hospital Stay Summary Sent Home With Patient in AVS:       Summary of Stay: Mey Kim is a 38 year old female with a history of ischemic stroke of the corona radiata in June 2018 managed low-dose aspirin, ACE, statin admitted on 1/13/2019 with single episode of hematemesis followed by a bloody stool concerning for an upper GI bleed. She has had a chronic dry cough for the past 6 months causing gagging and emesis.      She underwent EGD on the day of admission showing diamond wellington tear at the [facility] without e/o of ongoing bleeding, it was clipped and she was initiated on a full liquid diet.  Serial hgb notable for layla of 6.2 now s/p 1 unit(s) PRBC with good response to 8.1.  She is feeling well and is wishing for discharge due to work concerns.     At discharge her lisinopril has been discontinued and added to her allergy list given strong suspicion of causing chronic cough leading to gagging complicated by MW tear. I've recommended close f/u with primary MD.  If any symptoms recur she needs to be seen urgently in ER        Problem List:   1. GIB 2/2 diamond wellington tear-s/p clipping, appreciate GI prompt response.     2. Diamond Drew Tear, likely due to chronic cough and associated gagging.  Lisinopril strongly implicated as etiology for cough so discontinued at discharge.  Discharged with bid PPI and close f/u.  I've added lisinopril to her allergy list with SE of cough complicated by vomiting with MW tear.  Discussed with GI - she is ok for discharge and he agrees with resumption of asa tomorrow and bid ppi for one month.  No need for F/U with [facility]  3. Leukocytosis:  Suspect stress response-resolved   4. ABL anemia 2/2 GIB:  hgb 9.3->6.2, necessitating single PRBC unit transfusion, with good response, hgb at discharge 8.1  5. With several month history of a dry cough: I am strongly suspicious that this represents an ACE inhibitor cough. I have discontinued this at discharge given serious complication as outlined above  6. History of a CVA: She had a right corona radiata stroke back in June 2018.  This is currently being managed with a baby aspirin, low-dose statin therapy, and blood pressure management with ACE inhibitor. Asa held on admission, can resume tomorrow.  Discontinued ACE I due to suspicion this is causing her dry cough.  Currently BPs low normal due to ABL anemia so no need for BP management now, refer back to primary MD as she will likely require BP management again at some point.  Now that she has insurance she should f/u with neurology     Ok to return to work without restrictions tomorrow             Important Results:      As noted below         Pending Results:        Unresulted Labs Ordered in the Past 30 Days of this Admission     No orders found for last 61 day(s).            Discharge Instructions and Follow-Up:      Follow-up Appointments     Follow-up and recommended labs and tests       Ok to resume your baby aspirin tomorrow  Given the fact that your had a serious complication from your coughing I   have discontinued your lisinopril and added it to your allergy list    You should take pantoprazole  40 mg twice daily for the next 30 days and   then you can stop     F/u with your primary MD in 3-5 days    At that visit you should have your BP and hgb rechecked  You blood pressure is low and does not require medications currently due   to acute blood loss.    You should f/u with neurology as well, your primary can help you set that   up               Discharge Disposition:      Discharged to home         Discharge Medications:        Current Discharge Medication List      START taking these medications    Details   omeprazole (PRILOSEC) 40 MG DR capsule Take 1 capsule (40 mg) by mouth 2 times daily  Qty: 60 capsule, Refills: 0    Associated Diagnoses: Diamond-Drew tear         CONTINUE these medications which have CHANGED    Details   aspirin (ASA) 81 MG EC tablet Take 1 tablet (81 mg) by mouth daily Ok to resume this medication tomorrow  Qty: 30 tablet, Refills: 0    Associated Diagnoses: Acute ischemic stroke (H)         CONTINUE these medications which have NOT CHANGED    Details   rosuvastatin (CRESTOR) 20 MG tablet Take 1 tablet (20 mg) by mouth daily  Qty: 30 tablet, Refills: 3    Associated Diagnoses: Acute ischemic stroke (H)         STOP taking these medications       lisinopril (PRINIVIL/ZESTRIL) 2.5 MG tablet Comments:   Reason for Stopping:                 Allergies:       No Known Allergies        Consultations This Hospital Stay:      Consultation during this admission received from gastroenterology         Condition and Physical on Discharge:      Discharge condition: Stable   Vitals: Blood pressure 100/51, pulse 96, temperature 97  F (36.1  C), temperature source Oral, resp. rate 14, height 1.524 m (5'), weight 58.7 kg (129 lb 4.8 oz), last menstrual period 12/24/2018, SpO2 100 %.     Constitutional: Pleasant nad looks stated age head nc/at sclera clear   Lungs: ctab nl effort   Cardiovascular: rrr no mrg no le edema   Abdomen: S/nt/nd   Skin: W/d no c/c   Other: Alert and oriented affect  appropriate kwan          Discharge Time:      Greater than 30 minutes.        Image Results From This Hospital Stay (For Non-EPIC Providers):        Results for orders placed or performed during the hospital encounter of 01/13/19   CT Abdomen Pelvis w Contrast    Narrative    CT ABDOMEN AND PELVIS WITH CONTRAST 1/13/2019 8:00 AM     HISTORY: Abdominal pain, unspecified.    COMPARISON: [facility] 3, 2018    TECHNIQUE: Volumetric helical acquisition of CT images from the lung  bases through the symphysis pubis after the administration of 62 mL  Isovue-370 intravenous contrast. Radiation dose for this scan was  reduced using automated exposure control, adjustment of the mA and/or  kV according to patient size, or iterative reconstruction technique.    FINDINGS: The liver, bilateral kidneys and adrenal glands, pancreas,  and spleen demonstrate no worrisome focal lesion. Normal appendix.  Unremarkable pelvic structures. Stable probable left labial  Bartholin's gland cyst. Normal caliber aorta. Unremarkable  gallbladder. No hydronephrosis. No diverticulitis. There is no free  fluid in the abdomen or pelvis. No free air in the abdomen. Bone  windows reveal no destructive lesions.  There are no abdominal or  pelvic lymph nodes that are abnormal by size criteria. The visualized  lung bases are unremarkable. There are no dilated loops of small bowel  or colon.      Impression    IMPRESSION: No acute process demonstrated within the abdomen and  pelvis.    LALIT CLEMENTS MD           Most Recent Lab Results In EPIC (For Non-EPIC Providers):    Most Recent 3 CBC's:  Recent Labs   Lab Test 01/14/19  0649 01/13/19  2225 01/13/19  1356 01/13/19  0712 06/03/18  0410   WBC 6.9  --   --  21.4* 8.9   HGB 8.1* 6.2* 7.1* 9.3* 10.8*   MCV 63*  --   --  58* 56*   *  --   --  242 216      Most Recent 3 BMP's:  Recent Labs   Lab Test 01/14/19  0649 01/13/19  0712 06/03/18  0410    142 140   POTASSIUM 3.8 4.0 3.7   CHLORIDE 114* 111*  109   CO2 24 22 24   BUN 10 42* 8   CR 0.70 0.78 0.76   ANIONGAP 5 9 7   REMY 7.0* 8.0* 7.7*   GLC 87 117* 102*     Most Recent 3 INR's:  Recent Labs   Lab Test 01/13/19  0712 06/02/18  1950   INR 1.21* 1.08     Most Recent 2 LFT's:  Recent Labs   Lab Test 01/13/19  0712 06/03/18  0410   AST 16 12   ALT 25 16   ALKPHOS 36* 44   BILITOTAL 0.6 0.3       Most Recent TSH, T4 and HgbA1c:   Recent Labs   Lab Test 06/02/18  1958   A1C 4.3     EGD:  1/13/19    Findings:        Esophagus - normal to the [facility]; at the [facility] there was a deep Diamond-Drew        tear with no bleeding or stigmata of bleeding; the tear was closed with        a single hemoclip.        Stomach - normal        Duodenum - normal.                                                                                     Impression:               - Diamond - Drew tear; clipped   Recommendation:           - Return patient to hospital [facility] for ongoing care.                             - Full liquid diet today, then advance as tolerated                             - PPI

## 2019-01-14 NOTE — PLAN OF CARE
Pt vitals stable. Tele SR-ST with inverted t waves and ST depression (per tele tech).  HGB @2300 6.2. Consent signed with MD, received 1 unit PRBC. Tolerated well. Will recheck this AM.  Pt denies nausea. Has had no emesis, no bloody stool overnight.  No dizziness, steady on feet.   SBA to ambulate until hgb stable.  Tolerating full liquid diet.

## 2019-01-14 NOTE — PLAN OF CARE
Pt discharged home with [facility] as transport.  Discharge instructions reviewed with pt, she verbalized understanding and all questions were answered.  IV removed.  Pt discharged with all personal belongings and prescription sent to pt's pharmacy.

## 2019-01-14 NOTE — PLAN OF CARE
Pt is AOx4, up in room independently, ST on tele HR 90's to 120's. Pt denies pain/discomfort. Pt reports that her nausea and vomiting subsided, is tolerating a FLD. Hgb re-check was 6.2, MD notified. Awaiting signed consent for blood. Patient had 2 black stools. Plan to continue IV fluids and hgb checks. Will continue POC.

## 2019-07-06 ENCOUNTER — HOSPITAL ENCOUNTER (OUTPATIENT)
Facility: CLINIC | Age: 39
Discharge: HOME | End: 2019-07-06
Attending: OBSTETRICS & GYNECOLOGY | Admitting: OBSTETRICS & GYNECOLOGY
Payer: COMMERCIAL

## 2019-07-06 VITALS — SYSTOLIC BLOOD PRESSURE: 154 MMHG | DIASTOLIC BLOOD PRESSURE: 78 MMHG

## 2019-07-06 PROBLEM — Z36.89 ENCOUNTER FOR TRIAGE IN PREGNANT PATIENT: Status: ACTIVE | Noted: 2019-07-06

## 2019-07-06 PROCEDURE — 25000132 ZZH RX MED GY IP 250 OP 250 PS 637: Performed by: OBSTETRICS & GYNECOLOGY

## 2019-07-06 PROCEDURE — G0463 HOSPITAL OUTPT CLINIC VISIT: HCPCS

## 2019-07-06 RX ORDER — NIFEDIPINE 30 MG/1
60 TABLET, EXTENDED RELEASE ORAL DAILY
Status: COMPLETED | OUTPATIENT
Start: 2019-07-06 | End: 2019-07-06

## 2019-07-06 RX ORDER — NIFEDIPINE 30 MG/1
30 TABLET, EXTENDED RELEASE ORAL DAILY
Status: ON HOLD | COMMUNITY
End: 2019-12-20

## 2019-07-06 RX ORDER — NIFEDIPINE 10 MG/1
10 CAPSULE ORAL ONCE
Status: DISCONTINUED | OUTPATIENT
Start: 2019-07-06 | End: 2019-07-06 | Stop reason: HOSPADM

## 2019-07-06 RX ADMIN — NIFEDIPINE 60 MG: 30 TABLET, FILM COATED, EXTENDED RELEASE ORAL at 14:22

## 2019-07-06 NOTE — PLAN OF CARE
"Pt informed of POC and has decided to leave AMA. Pt states \"Im just going to leave, I have plans. I dont want to wait that long.\" It was explained to pt that she would be leaving against medical advice and that her doctor would like further evaluation. It was also explained that she is at an increased risk for stroke. Pt states she \"does not care.\"     Dr Merritt informed. Orders to give the 60 mg of Procardia before leaving if the pt will take it and have her change her home medication regimen to 60 mg a day. Pt agrees with this plan but is refusing doptones stating \"it will take too long.\" Pt did sign AMA form and AVS. No discharge orders placed as pt did not have an order to discharge.   "

## 2019-07-06 NOTE — DISCHARGE INSTRUCTIONS
Discharge Instruction for Undelivered Patients      You were seen for: blood pressure evaluation  We Consulted: Dr Merritt  You had (Test or Medicine):blood pressure monitoring     Diet:   Drink 8 to 12 glasses of liquids (milk, juice, water) every day.     Activity:  Call your doctor or nurse midwife if your baby is moving less than usual.     Call your provider if you notice:  Swelling in your face or increased swelling in your hands or legs.  Headaches that are not relieved by Tylenol (acetaminophen).  Changes in your vision (blurring: seeing spots or stars.)  Nausea (sick to your stomach) and vomiting (throwing up).   Weight gain of 5 pounds or more per week.  Heartburn that doesn't go away.  Signs of bladder infection: pain when you urinate (use the toilet), need to go more often and more urgently.  The bag of martinez (rupture of membranes) breaks, or you notice leaking in your underwear.  Bright red blood in your underwear.  Abdominal (lower belly) or stomach pain.  For first baby: Contractions (tightening) less than 5 minutes apart for one hour or more.  Second (plus) baby: Contractions (tightening) less than 10 minutes apart and getting stronger.  *If less than 34 weeks: Contractions (tightenings) more than 6 times in one hour.  Increase or change in vaginal discharge (note the color and amount)  Other: Change your medication management to 60 mg of Procardia once a day. Do not take tonight's dose since you were given it at the hospital. When taking your blood pressure at home, call your OB if it is 160/110 or higher.     Follow-up:  As scheduled in the clinic

## 2019-07-06 NOTE — PLAN OF CARE
here at 11.6 weeks for medication management of chronic hypertension. Pt currently taking 30 mg/day of Procardia to manage severe range BPs.  When dose was increased per OB, pt became hypotensive and symptomatic. Dose was then lowered and BPs went back to severe range. Verbal order per OB today is treat with 5 mg PO procardia if BPs are consistently in the severe range. Will change home dosing based on today's assessment. Pt does complain of frequent headaches that she takes tylenol for. Pt has a hx of a stroke in 2018.

## 2019-07-06 NOTE — PROVIDER NOTIFICATION
Dr Merritt informed of Pts BPs. Orders for a one time dose of Procardia, 60 mg now. Will monitor BPs for the next hour to determine a POC.

## 2019-10-03 RX ORDER — SODIUM CHLORIDE, SODIUM LACTATE, POTASSIUM CHLORIDE, CALCIUM CHLORIDE 600; 310; 30; 20 MG/100ML; MG/100ML; MG/100ML; MG/100ML
INJECTION, SOLUTION INTRAVENOUS CONTINUOUS
Status: CANCELLED | OUTPATIENT
Start: 2019-10-03

## 2019-10-03 RX ORDER — CITRIC ACID/SODIUM CITRATE 334-500MG
30 SOLUTION, ORAL ORAL
Status: CANCELLED | OUTPATIENT
Start: 2019-10-03

## 2019-10-03 RX ORDER — CEFAZOLIN SODIUM 1 G/3ML
1 INJECTION, POWDER, FOR SOLUTION INTRAMUSCULAR; INTRAVENOUS SEE ADMIN INSTRUCTIONS
Status: CANCELLED | OUTPATIENT
Start: 2019-10-03

## 2019-10-03 RX ORDER — CEFAZOLIN SODIUM 2 G/100ML
2 INJECTION, SOLUTION INTRAVENOUS
Status: CANCELLED | OUTPATIENT
Start: 2019-10-03

## 2019-10-04 ENCOUNTER — HEALTH MAINTENANCE LETTER (OUTPATIENT)
Age: 39
End: 2019-10-04

## 2019-11-19 ENCOUNTER — HOSPITAL ENCOUNTER (OUTPATIENT)
Facility: CLINIC | Age: 39
LOS: 1 days | Discharge: HOME | End: 2019-11-19
Attending: OBSTETRICS & GYNECOLOGY | Admitting: OBSTETRICS & GYNECOLOGY
Payer: COMMERCIAL

## 2019-11-19 ENCOUNTER — APPOINTMENT (OUTPATIENT)
Dept: ULTRASOUND IMAGING | Facility: CLINIC | Age: 39
End: 2019-11-19
Attending: OBSTETRICS & GYNECOLOGY
Payer: COMMERCIAL

## 2019-11-19 VITALS — SYSTOLIC BLOOD PRESSURE: 144 MMHG | TEMPERATURE: 98 F | DIASTOLIC BLOOD PRESSURE: 84 MMHG | RESPIRATION RATE: 18 BRPM

## 2019-11-19 DIAGNOSIS — Z36.89 ENCOUNTER FOR TRIAGE IN PREGNANT PATIENT: Primary | ICD-10-CM

## 2019-11-19 LAB
ALT SERPL W P-5'-P-CCNC: 20 U/L (ref 0–50)
AST SERPL W P-5'-P-CCNC: 27 U/L (ref 0–45)
CREAT SERPL-MCNC: 0.64 MG/DL (ref 0.52–1.04)
CREAT UR-MCNC: 110 MG/DL
ERYTHROCYTE [DISTWIDTH] IN BLOOD BY AUTOMATED COUNT: 16.6 % (ref 10–15)
GFR SERPL CREATININE-BSD FRML MDRD: >90 ML/MIN/{1.73_M2}
HCT VFR BLD AUTO: 27.4 % (ref 35–47)
HGB BLD-MCNC: 9.4 G/DL (ref 11.7–15.7)
MCH RBC QN AUTO: 21.5 PG (ref 26.5–33)
MCHC RBC AUTO-ENTMCNC: 34.3 G/DL (ref 31.5–36.5)
MCV RBC AUTO: 63 FL (ref 78–100)
PLATELET # BLD AUTO: 196 10E9/L (ref 150–450)
PROT UR-MCNC: 0.22 G/L
PROT/CREAT 24H UR: 0.2 G/G CR (ref 0–0.2)
RBC # BLD AUTO: 4.38 10E12/L (ref 3.8–5.2)
URATE SERPL-MCNC: 3.8 MG/DL (ref 2.6–6)
WBC # BLD AUTO: 12.5 10E9/L (ref 4–11)

## 2019-11-19 PROCEDURE — 85027 COMPLETE CBC AUTOMATED: CPT | Performed by: OBSTETRICS & GYNECOLOGY

## 2019-11-19 PROCEDURE — G0463 HOSPITAL OUTPT CLINIC VISIT: HCPCS | Mod: 25

## 2019-11-19 PROCEDURE — 84450 TRANSFERASE (AST) (SGOT): CPT | Performed by: OBSTETRICS & GYNECOLOGY

## 2019-11-19 PROCEDURE — 82565 ASSAY OF CREATININE: CPT | Performed by: OBSTETRICS & GYNECOLOGY

## 2019-11-19 PROCEDURE — 84460 ALANINE AMINO (ALT) (SGPT): CPT | Performed by: OBSTETRICS & GYNECOLOGY

## 2019-11-19 PROCEDURE — 84156 ASSAY OF PROTEIN URINE: CPT | Performed by: OBSTETRICS & GYNECOLOGY

## 2019-11-19 PROCEDURE — 76705 ECHO EXAM OF ABDOMEN: CPT

## 2019-11-19 PROCEDURE — 36415 COLL VENOUS BLD VENIPUNCTURE: CPT | Performed by: OBSTETRICS & GYNECOLOGY

## 2019-11-19 PROCEDURE — 84550 ASSAY OF BLOOD/URIC ACID: CPT | Performed by: OBSTETRICS & GYNECOLOGY

## 2019-11-19 RX ORDER — FERROUS SULFATE 325(65) MG
325 TABLET ORAL
Status: ON HOLD | COMMUNITY
End: 2020-03-28

## 2019-11-19 NOTE — H&P
2019    Mey Kim  [id number]            OB Admit History & Physical      Ms. Kim is a 39 year old  at 31+2 weeks gest having issues of chronic RUQ pain that she states has been an issue for past 3 months.  She notes that today it is not different, not worse or better and continues to be intermittent.    Pain she has fluctuates and she notes no corrolating issues.  Pain does not occur during or after meals, is not associated with baby kicking in the RUQ.  Pain is limited to Right side, no affecting mid epigastric.    ROS is neg for significant weight gain, edema, headache, visual changes.    Pt has been chronic htn but both previous pregnancies were not effected with preeclampsia.  Babies delivered at term.  One was induced, other presented in labor.    Baby is active, no contractions. No bleeding    Patient's last menstrual period was 2018 (approximate).   Her Estimated Date of Delivery: 2020  , making her 31w2d  wks.      Estimated body mass index is 25.25 kg/m  as calculated from the following:    Height as of 19: 1.524 m (5').    Weight as of 19: 58.7 kg (129 lb 4.8 oz).     Her prenatal course has been complicated by placental previa.  No bleeding, planning for section in one month    She is a 39 year old   Her OB history:   OB History    Para Term  AB Living   8 2 2 0 2 2   SAB TAB Ectopic Multiple Live Births   1 1 0 0 2      # Outcome Date GA Lbr Jaylon/2nd Weight Sex Delivery Anes PTL Lv   8 Current            7 TAB 2018     TAB      6 SAB 2017     SAB      5 Term 04 40w0d   M Vag-Spont   SHAWN   4  2004     TAB      3       TAB      2 Term 10/31/99 41w0d   F Vag-Spont   SHAWN   1       SAB               Past Medical History:   Diagnosis Date     CVA (cerebral vascular accident) (H) 2018    right corona radiata-hypecoagulable work up was negative     GIB (gastrointestinal bleeding) 2019     High cholesterol       Hypertension           Past Surgical History:   Procedure Laterality Date     DILATION AND CURETTAGE       DILATION AND CURETTAGE SUCTION WITH ULTRASOUND GUIDANCE N/A 3/31/2017    Procedure: DILATION AND CURETTAGE SUCTION WITH ULTRASOUND GUIDANCE;  Surgeon: Cary Ascencio MD;  Location: [facility] OR         No current outpatient medications on file.       Allergies: Lisinopril      REVIEW OF SYSTEMS:  NEUROLOGIC:  Negative  EYES:  Negative  ENT:  Negative  GI:  Negative  BREAST:  Negative  :  Negative  GYN:  Negative  CV:  Negative  PULMONARY:  Negative  MUSCULOSKELETAL:  Negative  PSYCH:  Negative        Social History     Socioeconomic History     Marital status: Single     Spouse name: Not on file     Number of children: Not on file     Years of education: Not on file     Highest education level: Not on file   Occupational History     Not on file   Social Needs     Financial resource strain: Not on file     Food insecurity:     Worry: Not on file     Inability: Not on file     Transportation needs:     Medical: Not on file     Non-medical: Not on file   Tobacco Use     Smoking status: Former Smoker     Smokeless tobacco: Former User     Quit date: 5/1/2019   Substance and Sexual Activity     Alcohol use: Yes     Comment: not with pregnancy     Drug use: Yes     Types: Marijuana     Comment: not with pregnancy     Sexual activity: Not on file   Lifestyle     Physical activity:     Days per week: Not on file     Minutes per session: Not on file     Stress: Not on file   Relationships     Social connections:     Talks on phone: Not on file     Gets together: Not on file     Attends Jehovah's witness service: Not on file     Active member of club or organization: Not on file     Attends meetings of clubs or organizations: Not on file     Relationship status: Not on file     Intimate partner violence:     Fear of current or ex partner: Not on file     Emotionally abused: Not on file     Physically abused: Not on file      Forced sexual activity: Not on file   Other Topics Concern     Not on file   Social History Narrative     Not on file      History reviewed. No pertinent family history.          Vitals:     With no contractions.    Alert Awake in NAD  HEENT grossly normal  Neck: no lymphadenopathy or thryoidomegaly  Lungs CTA  Back normal spinal or CVAT  Heart RRR  ABD gravid, No tenderness to palpation  EXT:  trace edema no calf tenderness  Neuro:  intact    Assessment:  IUP at 31w2d    RUQ pain    Labs so far have been normal  BP stable and averaging 130/80s    Plan:    Pending [facility] US    [unfilled]      Dejuan Arambula MD  Dept of OB/GYN  November 19, 2019

## 2019-11-19 NOTE — PLAN OF CARE
Odree here with c/o of RUQ pain.  Here for Pre-eclampsia eval.  Denies other s/s HTN.  Denies any cramps/LOF, or Bldg.  Fetus active.  Explained POC-  Labs and urine.  And then will update Dr. Arambula.

## 2019-11-20 NOTE — PROGRESS NOTES
Data: Patient assessed in the [facility] for elevated blood pressures.  Cervical exam not examined.  Membranes intact.  Contractions rare.  Action:  Presumed adequate fetal oxygenation documented (see flow record). Discharge instructions reviewed.  Patient instructed to report change in fetal movement, vaginal leaking of fluid or bleeding, abdominal pain, or any concerns related to the pregnancy to her nurse/physician.    Response: Orders to discharge home per Dejuan Arambula.  Patient verbalized understanding of education and verbalized agreement with plan. Discharged to home at 1855.

## 2019-11-20 NOTE — DISCHARGE INSTRUCTIONS
Discharge Instruction for Undelivered Patients      You were seen for: Fetal Assessment  We Consulted: Dr. Dejuan Arambula  You had (Test or Medicine):fetal monitoring, ultrasound- normal,  Blood work- normal     Diet:   Drink 8 to 12 glasses of liquids (milk, juice, water) every day.  You may eat meals and snacks.     Activity:  Call your doctor or nurse midwife if your baby is moving less than usual.     Call your provider if you notice:  Swelling in your face or increased swelling in your hands or legs.  Headaches that are not relieved by Tylenol (acetaminophen).  Changes in your vision (blurring: seeing spots or stars.)  Nausea (sick to your stomach) and vomiting (throwing up).   Weight gain of 5 pounds or more per week.  Heartburn that doesn't go away.  Signs of bladder infection: pain when you urinate (use the toilet), need to go more often and more urgently.  The bag of martinez (rupture of membranes) breaks, or you notice leaking in your underwear.  Bright red blood in your underwear.  Abdominal (lower belly) or stomach pain.  *If less than 34 weeks: Contractions (tightenings) more than 6 times in one hour.  Increase or change in vaginal discharge (note the color and amount)      Follow-up:  Make an appointment to be seen next week.

## 2019-12-16 ENCOUNTER — ANESTHESIA EVENT (OUTPATIENT)
Dept: OBGYN | Facility: CLINIC | Age: 39
End: 2019-12-16
Payer: COMMERCIAL

## 2019-12-16 ENCOUNTER — APPOINTMENT (OUTPATIENT)
Dept: CARDIOLOGY | Facility: CLINIC | Age: 39
End: 2019-12-16
Attending: PHYSICIAN ASSISTANT
Payer: COMMERCIAL

## 2019-12-16 ENCOUNTER — HOSPITAL ENCOUNTER (INPATIENT)
Facility: CLINIC | Age: 39
LOS: 4 days | Discharge: HOME | End: 2019-12-20
Attending: OBSTETRICS & GYNECOLOGY | Admitting: OBSTETRICS & GYNECOLOGY
Payer: COMMERCIAL

## 2019-12-16 ENCOUNTER — ANESTHESIA (OUTPATIENT)
Dept: OBGYN | Facility: CLINIC | Age: 39
End: 2019-12-16
Payer: COMMERCIAL

## 2019-12-16 DIAGNOSIS — O44.13 PLACENTA PREVIA WITH HEMORRHAGE IN THIRD TRIMESTER: ICD-10-CM

## 2019-12-16 DIAGNOSIS — I63.9 ACUTE ISCHEMIC STROKE (H): ICD-10-CM

## 2019-12-16 PROBLEM — O44.10: Status: ACTIVE | Noted: 2019-12-16

## 2019-12-16 LAB
ABO + RH BLD: NORMAL
ABO + RH BLD: NORMAL
AMPHETAMINES UR QL SCN: NEGATIVE
ANION GAP SERPL CALCULATED.3IONS-SCNC: 5 MMOL/L (ref 3–14)
BLD GP AB SCN SERPL QL: NORMAL
BLD PROD TYP BPU: NORMAL
BLOOD BANK CMNT PATIENT-IMP: NORMAL
BUN SERPL-MCNC: 12 MG/DL (ref 7–30)
CALCIUM SERPL-MCNC: 8.1 MG/DL (ref 8.5–10.1)
CANNABINOIDS UR QL: NEGATIVE
CHLORIDE SERPL-SCNC: 110 MMOL/L (ref 94–109)
CO2 SERPL-SCNC: 24 MMOL/L (ref 20–32)
COCAINE UR QL: NEGATIVE
CREAT SERPL-MCNC: 0.64 MG/DL (ref 0.52–1.04)
ERYTHROCYTE [DISTWIDTH] IN BLOOD BY AUTOMATED COUNT: 16.5 % (ref 10–15)
ERYTHROCYTE [DISTWIDTH] IN BLOOD BY AUTOMATED COUNT: 16.8 % (ref 10–15)
GFR SERPL CREATININE-BSD FRML MDRD: >90 ML/MIN/{1.73_M2}
GLUCOSE SERPL-MCNC: 120 MG/DL (ref 70–99)
HCT VFR BLD AUTO: 28.2 % (ref 35–47)
HCT VFR BLD AUTO: 28.9 % (ref 35–47)
HGB BLD-MCNC: 9.5 G/DL (ref 11.7–15.7)
HGB BLD-MCNC: 9.7 G/DL (ref 11.7–15.7)
MAGNESIUM SERPL-MCNC: 1.8 MG/DL (ref 1.6–2.3)
MCH RBC QN AUTO: 21.1 PG (ref 26.5–33)
MCH RBC QN AUTO: 21.2 PG (ref 26.5–33)
MCHC RBC AUTO-ENTMCNC: 33.6 G/DL (ref 31.5–36.5)
MCHC RBC AUTO-ENTMCNC: 33.7 G/DL (ref 31.5–36.5)
MCV RBC AUTO: 63 FL (ref 78–100)
MCV RBC AUTO: 63 FL (ref 78–100)
NUM BPU REQUESTED: 4
OPIATES UR QL SCN: NEGATIVE
PCP UR QL SCN: NEGATIVE
PLATELET # BLD AUTO: 193 10E9/L (ref 150–450)
PLATELET # BLD AUTO: 202 10E9/L (ref 150–450)
POTASSIUM SERPL-SCNC: 4.4 MMOL/L (ref 3.4–5.3)
RBC # BLD AUTO: 4.51 10E12/L (ref 3.8–5.2)
RBC # BLD AUTO: 4.58 10E12/L (ref 3.8–5.2)
SODIUM SERPL-SCNC: 139 MMOL/L (ref 133–144)
SPECIMEN EXP DATE BLD: NORMAL
T PALLIDUM AB SER QL: NONREACTIVE
TSH SERPL DL<=0.005 MIU/L-ACNC: 0.78 MU/L (ref 0.4–4)
WBC # BLD AUTO: 12.3 10E9/L (ref 4–11)
WBC # BLD AUTO: 22.6 10E9/L (ref 4–11)

## 2019-12-16 PROCEDURE — 25000125 ZZHC RX 250: Performed by: NURSE ANESTHETIST, CERTIFIED REGISTERED

## 2019-12-16 PROCEDURE — 99222 1ST HOSP IP/OBS MODERATE 55: CPT | Performed by: INTERNAL MEDICINE

## 2019-12-16 PROCEDURE — 25000128 H RX IP 250 OP 636: Performed by: OBSTETRICS & GYNECOLOGY

## 2019-12-16 PROCEDURE — 71000013 ZZH RECOVERY PHASE 1 LEVEL 1 EA ADDTL HR: Performed by: OBSTETRICS & GYNECOLOGY

## 2019-12-16 PROCEDURE — 40000275 ZZH STATISTIC RCP TIME EA 10 MIN

## 2019-12-16 PROCEDURE — 86923 COMPATIBILITY TEST ELECTRIC: CPT | Performed by: OBSTETRICS & GYNECOLOGY

## 2019-12-16 PROCEDURE — 93010 ELECTROCARDIOGRAM REPORT: CPT | Performed by: INTERNAL MEDICINE

## 2019-12-16 PROCEDURE — 86850 RBC ANTIBODY SCREEN: CPT | Performed by: OBSTETRICS & GYNECOLOGY

## 2019-12-16 PROCEDURE — 36415 COLL VENOUS BLD VENIPUNCTURE: CPT | Performed by: PHYSICIAN ASSISTANT

## 2019-12-16 PROCEDURE — 93306 TTE W/DOPPLER COMPLETE: CPT | Mod: 26 | Performed by: INTERNAL MEDICINE

## 2019-12-16 PROCEDURE — 80048 BASIC METABOLIC PNL TOTAL CA: CPT | Performed by: PHYSICIAN ASSISTANT

## 2019-12-16 PROCEDURE — 93005 ELECTROCARDIOGRAM TRACING: CPT

## 2019-12-16 PROCEDURE — 37000009 ZZH ANESTHESIA TECHNICAL FEE, EACH ADDTL 15 MIN: Performed by: OBSTETRICS & GYNECOLOGY

## 2019-12-16 PROCEDURE — 71000012 ZZH RECOVERY PHASE 1 LEVEL 1 FIRST HR: Performed by: OBSTETRICS & GYNECOLOGY

## 2019-12-16 PROCEDURE — 25000125 ZZHC RX 250: Performed by: OBSTETRICS & GYNECOLOGY

## 2019-12-16 PROCEDURE — 37000008 ZZH ANESTHESIA TECHNICAL FEE, 1ST 30 MIN: Performed by: OBSTETRICS & GYNECOLOGY

## 2019-12-16 PROCEDURE — 88307 TISSUE EXAM BY PATHOLOGIST: CPT | Performed by: OBSTETRICS & GYNECOLOGY

## 2019-12-16 PROCEDURE — 99207 ZZC CONSULT E&M CHANGED TO INITIAL LEVEL: CPT | Performed by: INTERNAL MEDICINE

## 2019-12-16 PROCEDURE — 25000132 ZZH RX MED GY IP 250 OP 250 PS 637: Performed by: OBSTETRICS & GYNECOLOGY

## 2019-12-16 PROCEDURE — 12000000 ZZH R&B MED SURG/OB

## 2019-12-16 PROCEDURE — 85027 COMPLETE CBC AUTOMATED: CPT | Performed by: OBSTETRICS & GYNECOLOGY

## 2019-12-16 PROCEDURE — 80307 DRUG TEST PRSMV CHEM ANLYZR: CPT | Performed by: OBSTETRICS & GYNECOLOGY

## 2019-12-16 PROCEDURE — 25800030 ZZH RX IP 258 OP 636: Performed by: OBSTETRICS & GYNECOLOGY

## 2019-12-16 PROCEDURE — 83735 ASSAY OF MAGNESIUM: CPT | Performed by: PHYSICIAN ASSISTANT

## 2019-12-16 PROCEDURE — 84443 ASSAY THYROID STIM HORMONE: CPT | Performed by: PHYSICIAN ASSISTANT

## 2019-12-16 PROCEDURE — 85027 COMPLETE CBC AUTOMATED: CPT | Performed by: PHYSICIAN ASSISTANT

## 2019-12-16 PROCEDURE — 25000128 H RX IP 250 OP 636: Performed by: NURSE ANESTHETIST, CERTIFIED REGISTERED

## 2019-12-16 PROCEDURE — 27210794 ZZH OR GENERAL SUPPLY STERILE: Performed by: OBSTETRICS & GYNECOLOGY

## 2019-12-16 PROCEDURE — 36000058 ZZH SURGERY LEVEL 3 EA 15 ADDTL MIN: Performed by: OBSTETRICS & GYNECOLOGY

## 2019-12-16 PROCEDURE — 36000056 ZZH SURGERY LEVEL 3 1ST 30 MIN: Performed by: OBSTETRICS & GYNECOLOGY

## 2019-12-16 PROCEDURE — 86900 BLOOD TYPING SEROLOGIC ABO: CPT | Performed by: OBSTETRICS & GYNECOLOGY

## 2019-12-16 PROCEDURE — 88307 TISSUE EXAM BY PATHOLOGIST: CPT | Mod: 26 | Performed by: OBSTETRICS & GYNECOLOGY

## 2019-12-16 PROCEDURE — G0463 HOSPITAL OUTPT CLINIC VISIT: HCPCS

## 2019-12-16 PROCEDURE — 86901 BLOOD TYPING SEROLOGIC RH(D): CPT | Performed by: OBSTETRICS & GYNECOLOGY

## 2019-12-16 PROCEDURE — 25000128 H RX IP 250 OP 636: Performed by: ANESTHESIOLOGY

## 2019-12-16 PROCEDURE — 93306 TTE W/DOPPLER COMPLETE: CPT

## 2019-12-16 PROCEDURE — 86780 TREPONEMA PALLIDUM: CPT | Performed by: OBSTETRICS & GYNECOLOGY

## 2019-12-16 RX ORDER — PROMETHAZINE HYDROCHLORIDE 25 MG/ML
25 INJECTION INTRAMUSCULAR; INTRAVENOUS ONCE
Status: DISCONTINUED | OUTPATIENT
Start: 2019-12-16 | End: 2019-12-17 | Stop reason: CLARIF

## 2019-12-16 RX ORDER — OXYTOCIN/0.9 % SODIUM CHLORIDE 30/500 ML
PLASTIC BAG, INJECTION (ML) INTRAVENOUS PRN
Status: DISCONTINUED | OUTPATIENT
Start: 2019-12-16 | End: 2019-12-16

## 2019-12-16 RX ORDER — ONDANSETRON 2 MG/ML
INJECTION INTRAMUSCULAR; INTRAVENOUS PRN
Status: DISCONTINUED | OUTPATIENT
Start: 2019-12-16 | End: 2019-12-16

## 2019-12-16 RX ORDER — NALOXONE HYDROCHLORIDE 0.4 MG/ML
.1-.4 INJECTION, SOLUTION INTRAMUSCULAR; INTRAVENOUS; SUBCUTANEOUS
Status: DISCONTINUED | OUTPATIENT
Start: 2019-12-16 | End: 2019-12-16

## 2019-12-16 RX ORDER — AMOXICILLIN 250 MG
1 CAPSULE ORAL 2 TIMES DAILY PRN
Status: DISCONTINUED | OUTPATIENT
Start: 2019-12-16 | End: 2019-12-20 | Stop reason: HOSPADM

## 2019-12-16 RX ORDER — SODIUM CHLORIDE, SODIUM LACTATE, POTASSIUM CHLORIDE, CALCIUM CHLORIDE 600; 310; 30; 20 MG/100ML; MG/100ML; MG/100ML; MG/100ML
INJECTION, SOLUTION INTRAVENOUS CONTINUOUS
Status: DISCONTINUED | OUTPATIENT
Start: 2019-12-16 | End: 2019-12-16

## 2019-12-16 RX ORDER — EPHEDRINE SULFATE 50 MG/ML
5 INJECTION, SOLUTION INTRAMUSCULAR; INTRAVENOUS; SUBCUTANEOUS
Status: DISCONTINUED | OUTPATIENT
Start: 2019-12-16 | End: 2019-12-17 | Stop reason: CLARIF

## 2019-12-16 RX ORDER — ACETAMINOPHEN 325 MG/1
975 TABLET ORAL EVERY 8 HOURS
Status: COMPLETED | OUTPATIENT
Start: 2019-12-16 | End: 2019-12-19

## 2019-12-16 RX ORDER — OXYTOCIN 10 [USP'U]/ML
10 INJECTION, SOLUTION INTRAMUSCULAR; INTRAVENOUS
Status: DISCONTINUED | OUTPATIENT
Start: 2019-12-16 | End: 2019-12-20 | Stop reason: HOSPADM

## 2019-12-16 RX ORDER — MORPHINE SULFATE 0.5 MG/ML
0.2 INJECTION, SOLUTION EPIDURAL; INTRATHECAL; INTRAVENOUS ONCE
Status: DISCONTINUED | OUTPATIENT
Start: 2019-12-16 | End: 2019-12-17 | Stop reason: CLARIF

## 2019-12-16 RX ORDER — LIDOCAINE 40 MG/G
CREAM TOPICAL
Status: DISCONTINUED | OUTPATIENT
Start: 2019-12-16 | End: 2019-12-20 | Stop reason: HOSPADM

## 2019-12-16 RX ORDER — DEXAMETHASONE SODIUM PHOSPHATE 4 MG/ML
INJECTION, SOLUTION INTRA-ARTICULAR; INTRALESIONAL; INTRAMUSCULAR; INTRAVENOUS; SOFT TISSUE PRN
Status: DISCONTINUED | OUTPATIENT
Start: 2019-12-16 | End: 2019-12-16

## 2019-12-16 RX ORDER — PHENYLEPHRINE HYDROCHLORIDE 10 MG/ML
INJECTION INTRAVENOUS PRN
Status: DISCONTINUED | OUTPATIENT
Start: 2019-12-16 | End: 2019-12-16

## 2019-12-16 RX ORDER — ONDANSETRON 2 MG/ML
4 INJECTION INTRAMUSCULAR; INTRAVENOUS EVERY 6 HOURS PRN
Status: DISCONTINUED | OUTPATIENT
Start: 2019-12-16 | End: 2019-12-17 | Stop reason: CLARIF

## 2019-12-16 RX ORDER — NALOXONE HYDROCHLORIDE 0.4 MG/ML
.1-.4 INJECTION, SOLUTION INTRAMUSCULAR; INTRAVENOUS; SUBCUTANEOUS
Status: DISCONTINUED | OUTPATIENT
Start: 2019-12-16 | End: 2019-12-17 | Stop reason: CLARIF

## 2019-12-16 RX ORDER — ONDANSETRON 4 MG/1
4 TABLET, ORALLY DISINTEGRATING ORAL EVERY 6 HOURS PRN
Status: DISCONTINUED | OUTPATIENT
Start: 2019-12-16 | End: 2019-12-17 | Stop reason: CLARIF

## 2019-12-16 RX ORDER — NALBUPHINE HYDROCHLORIDE 10 MG/ML
2.5-5 INJECTION, SOLUTION INTRAMUSCULAR; INTRAVENOUS; SUBCUTANEOUS EVERY 6 HOURS PRN
Status: DISCONTINUED | OUTPATIENT
Start: 2019-12-16 | End: 2019-12-17 | Stop reason: CLARIF

## 2019-12-16 RX ORDER — SODIUM CHLORIDE, SODIUM LACTATE, POTASSIUM CHLORIDE, CALCIUM CHLORIDE 600; 310; 30; 20 MG/100ML; MG/100ML; MG/100ML; MG/100ML
INJECTION, SOLUTION INTRAVENOUS CONTINUOUS
Status: DISCONTINUED | OUTPATIENT
Start: 2019-12-16 | End: 2019-12-16 | Stop reason: HOSPADM

## 2019-12-16 RX ORDER — CEFAZOLIN SODIUM 1 G/3ML
1 INJECTION, POWDER, FOR SOLUTION INTRAMUSCULAR; INTRAVENOUS SEE ADMIN INSTRUCTIONS
Status: DISCONTINUED | OUTPATIENT
Start: 2019-12-16 | End: 2019-12-16

## 2019-12-16 RX ORDER — ONDANSETRON 2 MG/ML
4 INJECTION INTRAMUSCULAR; INTRAVENOUS EVERY 6 HOURS PRN
Status: DISCONTINUED | OUTPATIENT
Start: 2019-12-16 | End: 2019-12-20 | Stop reason: HOSPADM

## 2019-12-16 RX ORDER — EPHEDRINE SULFATE 50 MG/ML
INJECTION, SOLUTION INTRAVENOUS PRN
Status: DISCONTINUED | OUTPATIENT
Start: 2019-12-16 | End: 2019-12-16

## 2019-12-16 RX ORDER — ACETAMINOPHEN 325 MG/1
650 TABLET ORAL EVERY 4 HOURS PRN
Status: DISCONTINUED | OUTPATIENT
Start: 2019-12-19 | End: 2019-12-20 | Stop reason: HOSPADM

## 2019-12-16 RX ORDER — NIFEDIPINE 30 MG/1
30 TABLET, EXTENDED RELEASE ORAL ONCE
Status: COMPLETED | OUTPATIENT
Start: 2019-12-16 | End: 2019-12-16

## 2019-12-16 RX ORDER — LABETALOL 20 MG/4 ML (5 MG/ML) INTRAVENOUS SYRINGE
10
Status: DISCONTINUED | OUTPATIENT
Start: 2019-12-16 | End: 2019-12-16 | Stop reason: HOSPADM

## 2019-12-16 RX ORDER — HYDROCORTISONE 2.5 %
CREAM (GRAM) TOPICAL 3 TIMES DAILY PRN
Status: DISCONTINUED | OUTPATIENT
Start: 2019-12-16 | End: 2019-12-20 | Stop reason: HOSPADM

## 2019-12-16 RX ORDER — EPHEDRINE SULFATE 50 MG/ML
25 INJECTION, SOLUTION INTRAVENOUS ONCE
Status: DISCONTINUED | OUTPATIENT
Start: 2019-12-16 | End: 2019-12-17 | Stop reason: CLARIF

## 2019-12-16 RX ORDER — KETOROLAC TROMETHAMINE 30 MG/ML
30 INJECTION, SOLUTION INTRAMUSCULAR; INTRAVENOUS EVERY 6 HOURS
Status: COMPLETED | OUTPATIENT
Start: 2019-12-16 | End: 2019-12-17

## 2019-12-16 RX ORDER — LANOLIN 100 %
OINTMENT (GRAM) TOPICAL
Status: DISCONTINUED | OUTPATIENT
Start: 2019-12-16 | End: 2019-12-20 | Stop reason: HOSPADM

## 2019-12-16 RX ORDER — NIFEDIPINE 30 MG/1
30 TABLET, EXTENDED RELEASE ORAL DAILY
Status: DISCONTINUED | OUTPATIENT
Start: 2019-12-17 | End: 2019-12-17

## 2019-12-16 RX ORDER — LIDOCAINE 40 MG/G
CREAM TOPICAL
Status: DISCONTINUED | OUTPATIENT
Start: 2019-12-16 | End: 2019-12-16

## 2019-12-16 RX ORDER — OXYTOCIN/0.9 % SODIUM CHLORIDE 30/500 ML
100 PLASTIC BAG, INJECTION (ML) INTRAVENOUS CONTINUOUS
Status: DISCONTINUED | OUTPATIENT
Start: 2019-12-16 | End: 2019-12-20 | Stop reason: HOSPADM

## 2019-12-16 RX ORDER — ONDANSETRON 4 MG/1
4 TABLET, ORALLY DISINTEGRATING ORAL EVERY 30 MIN PRN
Status: DISCONTINUED | OUTPATIENT
Start: 2019-12-16 | End: 2019-12-16 | Stop reason: HOSPADM

## 2019-12-16 RX ORDER — AMOXICILLIN 250 MG
2 CAPSULE ORAL 2 TIMES DAILY PRN
Status: DISCONTINUED | OUTPATIENT
Start: 2019-12-16 | End: 2019-12-20 | Stop reason: HOSPADM

## 2019-12-16 RX ORDER — SIMETHICONE 80 MG
80 TABLET,CHEWABLE ORAL 4 TIMES DAILY PRN
Status: DISCONTINUED | OUTPATIENT
Start: 2019-12-16 | End: 2019-12-20 | Stop reason: HOSPADM

## 2019-12-16 RX ORDER — BISACODYL 10 MG
10 SUPPOSITORY, RECTAL RECTAL DAILY PRN
Status: DISCONTINUED | OUTPATIENT
Start: 2019-12-18 | End: 2019-12-20 | Stop reason: HOSPADM

## 2019-12-16 RX ORDER — OXYTOCIN/0.9 % SODIUM CHLORIDE 30/500 ML
340 PLASTIC BAG, INJECTION (ML) INTRAVENOUS CONTINUOUS PRN
Status: DISCONTINUED | OUTPATIENT
Start: 2019-12-16 | End: 2019-12-20 | Stop reason: HOSPADM

## 2019-12-16 RX ORDER — OXYCODONE HYDROCHLORIDE 5 MG/1
5-10 TABLET ORAL
Status: DISCONTINUED | OUTPATIENT
Start: 2019-12-16 | End: 2019-12-20 | Stop reason: HOSPADM

## 2019-12-16 RX ORDER — CEFAZOLIN SODIUM 2 G/100ML
2 INJECTION, SOLUTION INTRAVENOUS
Status: COMPLETED | OUTPATIENT
Start: 2019-12-16 | End: 2019-12-16

## 2019-12-16 RX ORDER — HYDRALAZINE HYDROCHLORIDE 20 MG/ML
2.5-5 INJECTION INTRAMUSCULAR; INTRAVENOUS EVERY 10 MIN PRN
Status: DISCONTINUED | OUTPATIENT
Start: 2019-12-16 | End: 2019-12-16 | Stop reason: HOSPADM

## 2019-12-16 RX ORDER — IBUPROFEN 800 MG/1
800 TABLET, FILM COATED ORAL EVERY 6 HOURS PRN
Status: DISCONTINUED | OUTPATIENT
Start: 2019-12-16 | End: 2019-12-20 | Stop reason: HOSPADM

## 2019-12-16 RX ORDER — ONDANSETRON 2 MG/ML
4 INJECTION INTRAMUSCULAR; INTRAVENOUS EVERY 30 MIN PRN
Status: DISCONTINUED | OUTPATIENT
Start: 2019-12-16 | End: 2019-12-16 | Stop reason: HOSPADM

## 2019-12-16 RX ORDER — HYDROMORPHONE HYDROCHLORIDE 1 MG/ML
.3-.5 INJECTION, SOLUTION INTRAMUSCULAR; INTRAVENOUS; SUBCUTANEOUS EVERY 30 MIN PRN
Status: DISCONTINUED | OUTPATIENT
Start: 2019-12-16 | End: 2019-12-20 | Stop reason: HOSPADM

## 2019-12-16 RX ORDER — CITRIC ACID/SODIUM CITRATE 334-500MG
30 SOLUTION, ORAL ORAL
Status: COMPLETED | OUTPATIENT
Start: 2019-12-16 | End: 2019-12-16

## 2019-12-16 RX ORDER — LIDOCAINE HCL/EPINEPHRINE/PF 2%-1:200K
VIAL (ML) INJECTION PRN
Status: DISCONTINUED | OUTPATIENT
Start: 2019-12-16 | End: 2019-12-16

## 2019-12-16 RX ORDER — DEXTROSE, SODIUM CHLORIDE, SODIUM LACTATE, POTASSIUM CHLORIDE, AND CALCIUM CHLORIDE 5; .6; .31; .03; .02 G/100ML; G/100ML; G/100ML; G/100ML; G/100ML
INJECTION, SOLUTION INTRAVENOUS CONTINUOUS
Status: DISCONTINUED | OUTPATIENT
Start: 2019-12-16 | End: 2019-12-20 | Stop reason: HOSPADM

## 2019-12-16 RX ORDER — MAGNESIUM HYDROXIDE 1200 MG/15ML
LIQUID ORAL PRN
Status: DISCONTINUED | OUTPATIENT
Start: 2019-12-16 | End: 2019-12-20 | Stop reason: HOSPADM

## 2019-12-16 RX ADMIN — SODIUM CITRATE AND CITRIC ACID MONOHYDRATE 30 ML: 500; 334 SOLUTION ORAL at 03:20

## 2019-12-16 RX ADMIN — SODIUM CHLORIDE, POTASSIUM CHLORIDE, SODIUM LACTATE AND CALCIUM CHLORIDE: 600; 310; 30; 20 INJECTION, SOLUTION INTRAVENOUS at 03:29

## 2019-12-16 RX ADMIN — Medication 100 ML/HR: at 07:04

## 2019-12-16 RX ADMIN — NIFEDIPINE 30 MG: 30 TABLET, FILM COATED, EXTENDED RELEASE ORAL at 18:54

## 2019-12-16 RX ADMIN — SODIUM CHLORIDE, POTASSIUM CHLORIDE, SODIUM LACTATE AND CALCIUM CHLORIDE 1000 ML: 600; 310; 30; 20 INJECTION, SOLUTION INTRAVENOUS at 03:20

## 2019-12-16 RX ADMIN — EPHEDRINE SULFATE 5 MG: 50 INJECTION, SOLUTION INTRAVENOUS at 03:48

## 2019-12-16 RX ADMIN — SODIUM CHLORIDE, POTASSIUM CHLORIDE, SODIUM LACTATE AND CALCIUM CHLORIDE: 600; 310; 30; 20 INJECTION, SOLUTION INTRAVENOUS at 03:51

## 2019-12-16 RX ADMIN — CEFAZOLIN SODIUM 2 G: 2 INJECTION, SOLUTION INTRAVENOUS at 03:29

## 2019-12-16 RX ADMIN — SODIUM CHLORIDE, SODIUM LACTATE, POTASSIUM CHLORIDE, CALCIUM CHLORIDE AND DEXTROSE MONOHYDRATE: 5; 600; 310; 30; 20 INJECTION, SOLUTION INTRAVENOUS at 16:33

## 2019-12-16 RX ADMIN — PHENYLEPHRINE HYDROCHLORIDE 100 MCG: 10 INJECTION INTRAVENOUS at 04:06

## 2019-12-16 RX ADMIN — KETOROLAC TROMETHAMINE 30 MG: 30 INJECTION, SOLUTION INTRAMUSCULAR at 07:06

## 2019-12-16 RX ADMIN — KETOROLAC TROMETHAMINE 30 MG: 30 INJECTION, SOLUTION INTRAMUSCULAR at 18:54

## 2019-12-16 RX ADMIN — ONDANSETRON HYDROCHLORIDE 4 MG: 2 INJECTION, SOLUTION INTRAMUSCULAR; INTRAVENOUS at 10:17

## 2019-12-16 RX ADMIN — OXYTOCIN-SODIUM CHLORIDE 0.9% IV SOLN 30 UNIT/500ML 400 ML: 30-0.9/5 SOLUTION at 04:38

## 2019-12-16 RX ADMIN — PHENYLEPHRINE HYDROCHLORIDE 100 MCG: 10 INJECTION INTRAVENOUS at 03:37

## 2019-12-16 RX ADMIN — ACETAMINOPHEN 975 MG: 325 TABLET, FILM COATED ORAL at 18:54

## 2019-12-16 RX ADMIN — ONDANSETRON 4 MG: 2 INJECTION INTRAMUSCULAR; INTRAVENOUS at 03:39

## 2019-12-16 RX ADMIN — PHENYLEPHRINE HYDROCHLORIDE 50 MCG: 10 INJECTION INTRAVENOUS at 04:01

## 2019-12-16 RX ADMIN — LIDOCAINE HYDROCHLORIDE,EPINEPHRINE BITARTRATE 1 ML: 20; .005 INJECTION, SOLUTION EPIDURAL; INFILTRATION; INTRACAUDAL; PERINEURAL at 03:57

## 2019-12-16 RX ADMIN — SODIUM CHLORIDE, POTASSIUM CHLORIDE, SODIUM LACTATE AND CALCIUM CHLORIDE: 600; 310; 30; 20 INJECTION, SOLUTION INTRAVENOUS at 04:36

## 2019-12-16 RX ADMIN — PHENYLEPHRINE HYDROCHLORIDE 100 MCG: 10 INJECTION INTRAVENOUS at 03:48

## 2019-12-16 RX ADMIN — KETOROLAC TROMETHAMINE 30 MG: 30 INJECTION, SOLUTION INTRAMUSCULAR at 12:52

## 2019-12-16 RX ADMIN — SENNOSIDES AND DOCUSATE SODIUM 1 TABLET: 8.6; 5 TABLET ORAL at 18:54

## 2019-12-16 RX ADMIN — ACETAMINOPHEN 975 MG: 325 TABLET, FILM COATED ORAL at 10:17

## 2019-12-16 RX ADMIN — DEXAMETHASONE SODIUM PHOSPHATE 4 MG: 4 INJECTION, SOLUTION INTRA-ARTICULAR; INTRALESIONAL; INTRAMUSCULAR; INTRAVENOUS; SOFT TISSUE at 04:08

## 2019-12-16 RX ADMIN — PHENYLEPHRINE HYDROCHLORIDE 50 MCG: 10 INJECTION INTRAVENOUS at 04:15

## 2019-12-16 RX ADMIN — PHENYLEPHRINE HYDROCHLORIDE 100 MCG: 10 INJECTION INTRAVENOUS at 03:43

## 2019-12-16 ASSESSMENT — ACTIVITIES OF DAILY LIVING (ADL)
SWALLOWING: 0-->SWALLOWS FOODS/LIQUIDS WITHOUT DIFFICULTY
DRESS: 0-->INDEPENDENT
RETIRED_COMMUNICATION: 0-->UNDERSTANDS/COMMUNICATES WITHOUT DIFFICULTY
TRANSFERRING: 4-->COMPLETELY DEPENDENT
FALL_HISTORY_WITHIN_LAST_SIX_MONTHS: NO
AMBULATION: 0-->INDEPENDENT
DRESS: 4-->COMPLETELY DEPENDENT
RETIRED_COMMUNICATION: 0-->UNDERSTANDS/COMMUNICATES WITHOUT DIFFICULTY
SWALLOWING: 0-->SWALLOWS FOODS/LIQUIDS WITHOUT DIFFICULTY
TOILETING: 0-->INDEPENDENT
BATHING: 0-->INDEPENDENT
RETIRED_EATING: 0-->INDEPENDENT
COGNITION: 0 - NO COGNITION ISSUES REPORTED
TOILETING: 4-->COMPLETELY DEPENDENT
RETIRED_EATING: 0-->INDEPENDENT
COGNITION: 0 - NO COGNITION ISSUES REPORTED
AMBULATION: 4-->COMPLETELY DEPENDENT
BATHING: 4-->COMPLETELY DEPENDENT
TRANSFERRING: 0-->INDEPENDENT
FALL_HISTORY_WITHIN_LAST_SIX_MONTHS: NO

## 2019-12-16 NOTE — PROGRESS NOTES
An EKG was completed on the pt, with no complications noted during the procedure.    Joel Nova, RT on 12/16/2019 at 6:12 AM

## 2019-12-16 NOTE — CONSULTS
Hospitalist Consultation      Mey Kim MRN# [id number]   YOB: 1980 Age: 39 year old   Date of Admission: 2019     Requesting Physician:  Dr. Eugene  Reason for consult: Possible  arrthymia           Assessment and Plan:   This patient is a 39 year old  female currently with IUP at 35w1d with a PMH significant for HTN, hx of Rt Lacunar infarct maintained on ASA (2018), Hgb E hemoglobinopathy, Hx of depression, GIB due to chapito wellington tear who is post partum day 0 after  for vaginal bleeding due to complete placenta previa. We were consulted for concerns of irregular heart rhythm and audible murmur heard during routine post partum evaluation. Stat EKG performed shows NSR with HR in the 60's. No ischemic changes and EKG is similar to previous EKG in 2018.  Pt remains asymptomatic from a cardiac standpoint. We were consulted for further assessment.     1. Possible murmur and arrhythmia by nursing staff during post partum evaluation.    --Pt remains asymptomatic w/o CP, SOB, palpitations. EKG shows no dysrhythmia. I can not appreciate a murmur on exam.   --Possibly sinus arrhthymias or benign PVCs  --Last ECHO 2018 shows normal EF 60%, no valvular dysfunction, there was a thickening of the aortic wall and possible flap like structure concerning for possible dissection but subsequent CT aortogram was negative.   --Currently HD stable. Will monitor on tele for 24 hrs and repeat ECHO to r/o pregnancy related cardiomyopathies for completeness sake.   --Will check K and mg, TSH    2. Hx of Right lacunar infarct--etiology unclear. Seen by Neurology and no etiology found. Hypercoag lab testing was negative. ECHO bubble negative. Has been maintained on ASA, BP meds and statin.   --No deficits seen. Stable from Neurologic standpoint.   --Would recommend resuming ASA as soon as it's ok with OB     3. HTN: BP acceptable post-partum.   --Resume Nifedipine    4. 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 Post Partum Day 0 s/p --cont PP care as per primary.     6. DVT Prophylaxis: on scd as per primary  7. D/C planning: To home as per primary             History of Present Illness:   This patient is a 39 year old  female currently with IUP at 35w1d with a PMH significant for HTN, hx of Rt Lacunar infarct maintained on ASA (2018), Hgb E hemoglobinopathy, Hx of depression, GIB due to chapito wellington tear who is post partum day 0 after  for vaginal bleeding due to complete placenta previa. We were consulted for concerns of irregular heart rhythm and audible murmur heard during routine post partum evaluation. Stat EKG performed shows NSR with HR in the 60's. No ischemic changes and EKG is similar to previous EKG in 2018.  Pt remains asymptomatic from a cardiac standpoint. We were consulted for further assessment.     Pt has a hx of right lacunar infarct, she presented with left sided weakness. MRI showed acute lacunar type infarct in the right corona radiata extending into the region of the posterior limb of the right internal capsule. She was seen by neuro at [facility] and underwent ECHO and hypercoag study that was all negative. She never did follow up with Neurology as she did not have insurance. She has not had any other re-occurrence.She has no hx of cardiac arrhthymias or hx of CAD. No family hx of CAD.               Past Medical History:     Past Medical History:   Diagnosis Date     CVA (cerebral vascular accident) (H) 2018    right corona radiata-hypecoagulable work up was negative     GIB (gastrointestinal bleeding) 2019     High cholesterol      Hypertension                Past Surgical History:     Past Surgical History:   Procedure Laterality Date     DILATION AND CURETTAGE       DILATION AND CURETTAGE SUCTION WITH ULTRASOUND GUIDANCE N/A 3/31/2017    Procedure: DILATION AND CURETTAGE SUCTION  WITH ULTRASOUND GUIDANCE;  Surgeon: Cary Ascencio MD;  Location: [facility] OR                 Social History:     Social History     Tobacco Use     Smoking status: Former Smoker     Smokeless tobacco: Former User     Quit date: 5/1/2019   Substance Use Topics     Alcohol use: Yes     Comment: not with pregnancy     Drug use: Yes     Types: Marijuana     Comment: Early pregnancy                  Family History:   Family Hx fully reviewed and is non contributory to this admission.             Allergies:     Allergies   Allergen Reactions     Lisinopril      Cough causing gagging leading to chapito wellington tear             Medications:     Prior to Admission medications    Medication Sig Last Dose Taking? Auth Provider   ferrous sulfate (FEROSUL) 325 (65 Fe) MG tablet Take 325 mg by mouth daily (with breakfast)   Reported, Patient   NIFEdipine ER OSMOTIC (PROCARDIA XL) 30 MG 24 hr tablet Take 30 mg by mouth daily   Reported, Patient   Prenatal MV-Min-Fe Fum-FA-DHA (PRENATAL 1 PO)    Reported, Patient   rosuvastatin (CRESTOR) 20 MG tablet Take 1 tablet (20 mg) by mouth daily   Mila Jones MD               Review of Systems:   A comprehensive greater than 10 system review of systems was carried out.  Pertinent positives and negatives are noted above.  Otherwise negative for contributory info.            Physical Exam:   Vitals were reviewed  Blood pressure 136/78, temperature 97.7  F (36.5  C), temperature source Oral, resp. rate 16, last menstrual period 12/24/2018, SpO2 97 %, unknown if currently breastfeeding.  Exam:    GENERAL:  Comfortable. But nauseated and dry heaving during exam  PSYCH: pleasant, oriented, No acute distress.  HEENT:  PERRLA. Normal conjunctiva, normal hearing, nasal mucosa and Oropharynx are normal.  NECK:  Supple, no neck vein distention, adenopathy or bruits, normal thyroid.  HEART:  Normal S1, S2 with no murmur, no pericardial rub, S3 or S4.  LUNGS:  Clear to auscultation, normal  Respiratory effort.  ABDOMEN:  Soft, no hepatosplenomegaly, normal bowel sounds.  EXTREMITIES:  No pedal edema, +2 pulses bilateral and equal.  SKIN:  Dry to touch, No rash, wound or ulcerations.  NEUROLOGIC:  Nonfocal with normal cranial nerve and motor power and sensation.          Data:   Past 24 hours labs, studies, and imaging were reviewed.  Recent Labs   Lab 12/16/19  0320   WBC 12.3*   HGB 9.5*   HCT 28.2*   MCV 63*        Ekta Castellanos PA-C

## 2019-12-16 NOTE — PLAN OF CARE
Patients mobililty level scored using the bedside mobility assistance tool (BMAT). Patient is at a mobility level test number: 1. Mobility equipment used: none, pt rolled. Required assist of 3 staff members. Further use of BMAT scoring required. Pt is tolerating ice and water now, no vomiting, improved, brought and apple juice and encouraged to try; pt is mostly drowsy, arouses quickly when asked her name; incision is wnl, on telemetry, had Hx of stroke in 2018, seen by SW at shift, continue to monitor.

## 2019-12-16 NOTE — CONSULTS
[facility]  MATERNAL CHILD HEALTH   INITIAL PSYCHOSOCIAL ASSESSMENT      DATA:      Reason for Social Work Consult: Marijuana use during pregnancy, Hx of depression, not bonding with baby.     Presenting Information: It was reported the pt is not bonding with the baby.      Social Support:  Patient stated she has support, but did not state who her support system was.     Education: Was not discussed.     Insurance: Medicaid     Source of Financial Support: Pt works full time doing dental enrollment.     Mental Health History: Pt states she does not have a mental health history, but this writer was notified by the consult that the pt has a history of depression.     History of Postpartum Mood Disorders: Pt stated she does not have a history of PMAD      Chemical Health History: Pt stated she does not have a substance use history. However, it is noted that she used marijuana early on in the pregnancy.        INTERVENTION:        SW completed chart review and collaborated with the multidisciplinary team.     Psychosocial Assessment     Introduction to Maternal Child Health  role and scope of practice     Reviewed Hospital and Community Resources     Assessed Chemical Health History and Current Symptoms     Assessed Mental Health History and Current Symptoms     Identified stressors, barriers and family concerns     Provided support and active empathetic listening and validation.     Provided psychoeducation on  mood and anxiety disorders, assessed for any current symptoms or history    Provided brochure Depression and Anxiety During and after Pregnancy.     ASSESSMENT:      Coping: Pt appeared sleepy, as she was speaking with her eyes closed during the assessment.      Affect:  Affect is withdrawn, pt was very short and did not speak much when asked questions.     Mood:   Pt stated she was tired.     Motivation/Ability to Access Services: Pt is independent with accessing services.       Assessment of Support System: Pt stated she feels supported, but did not state who her support system is.      Level of engagement with SW:  Pt was not very engaged with SW, her eyes were closed during conversation     Family and parent/infant interactions: Pt did not interact with the infant during the assessment, it is noted in the chart that the pt has not held or fed the baby.     Assessment of parental risk for PMAD: Pt is at a normal risk for PMAD given she has older children (15 and 21 yo)     Strengths:  Pt is independent     Identified Barriers: Pt is very short with staff and will not give detailed answers, therefore SW was not able to do a detailed assessment.     PLAN:   Pt appeared sleepy during the assessment. Staff noted the pt has not held or fed the infant since birth. Pt denied any history of mental health history or substance use. Pt appeared as though she did not want to speak with social work, although said it was a good time to conduct the assessment. Pt was given resources for PPD and resources for new parents.     SW will follow up with pt when she is more awake and will speak about depression, substance use, and will talk to staff about pt bonding with infant.    Rosaura Garcia, [facility]  [phone]

## 2019-12-16 NOTE — OP NOTE
[facility] [place]   Section Operative Note    Mey Kim  [id number]  2019    Pre-operative Diagnosis:   -IUP at 35w1d  -Vaginal bleeding  -Complete placenta previa  -History of stroke  -Chronic hypertension  -Anemia  -Hemoglobin E disease    Post-operative Diagnosis:   -Same, now delivered    Procedure: Primary low transverse  section via Pfannensteil incision with double layer uterine closure    Surgeon: Karon Eugene MD    Assistant: YAIMA Salazar    Anesthesia: Spinal    QBL:  980 mL     Total IV Fluids: 2000 mL LR    UOP: 315 mL     Specimens: Placenta, cord blood, cord gases           Complications: None    Indications: This patient is a 39 year old  at 35w1d by 8w1d US who presents with heavy vaginal bleeding with passage of clots in the setting of a known complete placenta previa. Examination on admission reveals heavy vaginal bleeding on speculum exam with passage of significant clots. Given this in the setting of known previa as well as her gestational age, recommendation was made to proceed with primary  delivery at this time. Patient is agreeable. Risks, benefits, and alternatives were discussed and informed consent was signed.    Findings: Singe liveborn male infant in cephalic presentation with Apgars of 8 and 9. Weight of 2380 grams. Placenta previa noted with anterior extension of placenta though primarily posterior. Placenta somewhat sticky to get out but no obvious accreta. Normal uterus, fallopian tubes, and ovaries.    Procedure Details:  The risks, benefits, complications, treatment options, and expected outcomes were discussed with the patient.  The patient concurred with the proposed plan, giving informed consent.  The site of surgery properly noted/marked. The patient was taken to the OR, identified as Mey Kim and the procedure verified as  Delivery. A Time Out was held and the above  information confirmed.    After induction of Spinal anesthesia, the patient was draped and prepped in the usual sterile manner. A Pfannenstiel incision was made and carried down sharply through the subcutaneous tissue to the fascia. Fascia was nicked on either side of the midline and incision was extended using blunt and sharp. The fascia was  from the underlying rectus tissue superiorly and inferiorly using blunt and sharp dissection. The peritoneum was identified and entered high with care to avoid the bladder. Peritoneal incision was extended with blunt dissection. Bladder blade was placed. The vesicouterine peritoneal reflection was identified and incised transversely and the bladder flap was bluntly freed from the lower uterine segment. The bladder blade was replaced. A low transverse uterine incision was made and extended using cephalad-caudad digital pressure. Fetus was delivered atraumatically. The umbilical cord was clamped and cut at this time and baby was brought over to waiting NICU staff. Cord blood was obtained for evaluation. A segment of cord was cut and sent for cord gases. The placenta was removed intact via manual extract. There was some areas that were sticky on the anterior portion of the uterus but overall there was no areas of obvious invasion. The uterus was then exteriorized and cleared of all clot and debris. The uterine incision was closed in double layer fashion with a running locked suture of 0 Vicryl and an imbricating stitch of 0 Monocryl. Hemostasis was observed. The uterus was returned to the abdomen and the hysterotomy was re-inspected with bleeding noted along the hysterotomy in several places. These areas were controlled with several figure of X sutures of 0 Vicryl. Dominga powder was placed for additional hemostatic assurance. Attention was turned to the fascia where hemostasis was obtained with cautery.The fascia was then reapproximated with running sutures of 0 Vicryl.  Subcutaneous tissue was copiously irrigated at this time. Subcutaneous tissue was was reapproximated with 3-0 plain gut. The skin was then closed with 4-0 Vicryl followed by Exofin skin glue.    Instrument, sponge, and needle counts were correct prior the abdominal closure and at the conclusion of the case.     Ashley Hieronimus, MD [facility]  12/16/2019 3:06 AM

## 2019-12-16 NOTE — ANESTHESIA PREPROCEDURE EVALUATION
Anesthesia Pre-Procedure Evaluation    Patient: Mey Kim   MRN: [id number] : 1980          Preoperative Diagnosis: Placenta previa antepartum [O44.00]    Procedure(s):  PRIMARY  SECTION    Past Medical History:   Diagnosis Date     CVA (cerebral vascular accident) (H) 2018    right corona radiata-hypecoagulable work up was negative     GIB (gastrointestinal bleeding) 2019     High cholesterol      Hypertension      Past Surgical History:   Procedure Laterality Date     DILATION AND CURETTAGE       DILATION AND CURETTAGE SUCTION WITH ULTRASOUND GUIDANCE N/A 3/31/2017    Procedure: DILATION AND CURETTAGE SUCTION WITH ULTRASOUND GUIDANCE;  Surgeon: Cary Ascencio MD;  Location: [facility] OR     Anesthesia Evaluation     . Pt has had prior anesthetic.     No history of anesthetic complications          ROS/MED HX    ENT/Pulmonary:       Neurologic:     (+)CVA TIA     Cardiovascular:     (+) Dyslipidemia, hypertension----. : . . . :. .       METS/Exercise Tolerance:     Hematologic:         Musculoskeletal:         GI/Hepatic:     (+) Other GI/Hepatic h/o GIB      Renal/Genitourinary:         Endo:         Psychiatric:         Infectious Disease:         Malignancy:         Other:    (+) Possibly pregnant                         Physical Exam  Normal systems: cardiovascular and pulmonary    Airway   Mallampati: III  TM distance: >3 FB  Neck ROM: full    Dental     Cardiovascular       Pulmonary             Lab Results   Component Value Date    WBC 12.5 (H) 2019    HGB 9.4 (L) 2019    HCT 27.4 (L) 2019     2019    SED 7 2018     2019    POTASSIUM 3.8 2019    CHLORIDE 114 (H) 2019    CO2 24 2019    BUN 10 2019    CR 0.64 2019    GLC 87 2019    REMY 7.0 (L) 2019    ALBUMIN 3.3 (L) 2019    PROTTOTAL 6.3 (L) 2019    ALT 20 2019    AST 27 2019    ALKPHOS 36 (L) 2019    BILITOTAL  0.6 01/13/2019    PTT 30 06/02/2018    INR 1.21 (H) 01/13/2019    HCGS Negative 03/31/2017       Preop Vitals  BP Readings from Last 3 Encounters:   11/19/19 (!) 144/84   07/06/19 154/78   01/14/19 119/53    Pulse Readings from Last 3 Encounters:   01/14/19 96   06/04/18 75   06/02/18 77      Resp Readings from Last 3 Encounters:   11/19/19 18   01/14/19 18   06/04/18 16    SpO2 Readings from Last 3 Encounters:   01/14/19 100%   06/04/18 99%   06/02/18 100%      Temp Readings from Last 1 Encounters:   11/19/19 98  F (36.7  C) (Oral)    Ht Readings from Last 1 Encounters:   01/13/19 1.524 m (5')      Wt Readings from Last 1 Encounters:   01/13/19 58.7 kg (129 lb 4.8 oz)    Estimated body mass index is 25.25 kg/m  as calculated from the following:    Height as of 1/13/19: 1.524 m (5').    Weight as of 1/13/19: 58.7 kg (129 lb 4.8 oz).       Anesthesia Plan      History & Physical Review  History and physical reviewed and following examination; no interval change.    ASA Status:  3 emergent.    NPO Status:  Waived due to emergency    Plan for Spinal     Bleeding, previa for urgent CS, duramorph w/ SAB, with GETA as backup      Postoperative Care  Postoperative pain management:  Neuraxial analgesia.      Consents  Anesthetic plan, risks, benefits and alternatives discussed with:  Patient.  Use of blood products discussed: Yes.   Use of blood products discussed with Patient.  Consented to blood products.  .                 Ahsan Squires MD                    .

## 2019-12-16 NOTE — ANESTHESIA PROCEDURE NOTES
Peripheral nerve/Neuraxial procedure note : intrathecal  Pre-Procedure  Performed by Ahsan Squires MD  Referred by [facility]  Location: OB, OR      Pre-Anesthestic Checklist: patient identified, IV checked, risks and benefits discussed, informed consent, monitors and equipment checked, pre-op evaluation and at physician/surgeon's request    Timeout  Correct Patient: Yes   Correct Procedure: Yes   Correct Site: Yes   Correct Laterality: N/A   Correct Position: Yes   Site Marked: N/A   .   Procedure Documentation  ASA 2  Diagnosis:cs previa.    Procedure: intrathecal, .   Patient Position:sitting Insertion Site:L3-4  (midline approach)     Patient Prep/Sterile Barriers; mask, sterile gloves, povidone-iodine 7.5% surgical scrub, patient draped.  .  Needle:  Spinal Needle (gauge): 24  Spinal/LP Needle Length (inches): 3.5 # of attempts: 1 and  # of redirects:  1 Introducer used .        Assessment/Narrative  Paresthesias: No.  .  .  0.01 mL of clear CSF fluid removed . Comments:  2cc .75marc and 0.2 duramorph

## 2019-12-16 NOTE — PLAN OF CARE
Pt resting in bed. Eyes closed between care. VSS. 2 consecutive blood pressures > 140 systolically; however, last blood pressure within normal limits. No signs/symptoms of preeclampsia. Fundus firm, midline, 1/U. Bleeding scant to light. Pitocin infusing. Immediate recovery period complete. Report given to CHACORTA Momin. Rosalee Spaulding RN on 12/16/2019 at 7:43 AM

## 2019-12-16 NOTE — H&P
[facility] Labor and Delivery History and Physical    Mey Kim MRN# [id number]   Age: 39 year old YOB: 1980     Date of Admission:  2019         CC:    Vaginal bleeding         HPI:   Mey Kim is a 39 year old  at 35w1d by 8w4d US who presents with complaints of vaginal bleeding that is heavy. It started around 0100 and includes bright bleeding and clots. She also notes intermittent cramping. Baby is moving as usual. Pregnancy is complicated by known placenta previa. She has not had bleeding prior to this. 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          Pregnancy history:     OBSTETRIC HISTORY:    OB History    Para Term  AB Living   8 2 2 0 2 2   SAB TAB Ectopic Multiple Live Births   1 1 0 0 2      # Outcome Date GA Lbr Jaylon/2nd Weight Sex Delivery Anes PTL Lv   8 Current            7 TAB 2018     TAB      6 SAB 2017     SAB      5 Term 04 40w0d   M Vag-Spont   SHAWN   4       TAB      3       TAB      2 Term 10/31/99 41w0d   F Vag-Spont   SHAWN   1       SAB          Prenatal Labs:   Blood Type: O+  Rubella: Immune  HIV: NR  Hep B Surface Ag:  Syphilis: NR in first trimester. False positive initial test in 2nd trimester but confirmatory testing was negative  GCT: Normal  Last Hgb: 9.4    GBS Status:   Negative    Medication Prior to Admission  Medications Prior to Admission   Medication Sig Dispense Refill Last Dose     [] aspirin (ASA) 81 MG EC tablet Take 1 tablet (81 mg) by mouth daily Ok to resume this medication tomorrow 30 tablet 0 2019 at Unknown time     ferrous sulfate (FEROSUL) 325 (65 Fe) MG tablet Take 325 mg by mouth daily (with breakfast)   2019 at Unknown time     NIFEdipine ER OSMOTIC (PROCARDIA XL) 30 MG 24 hr tablet Take 30 mg by mouth daily   2019 at 1900     []  omeprazole (PRILOSEC) 40 MG DR capsule Take 1 capsule (40 mg) by mouth 2 times daily 60 capsule 0 2019 at Unknown time     Prenatal MV-Min-Fe Fum-FA-DHA (PRENATAL 1 PO)    2019 at Unknown time     rosuvastatin (CRESTOR) 20 MG tablet Take 1 tablet (20 mg) by mouth daily 30 tablet 3 2019 at AM   .        Maternal Past Medical History:     Past Medical History:   Diagnosis Date     CVA (cerebral vascular accident) (H) 2018    right corona radiata-hypecoagulable work up was negative     GIB (gastrointestinal bleeding) 2019     High cholesterol      Hypertension                        Family History:   No family history on file.         Social History:     Social History     Tobacco Use     Smoking status: Former Smoker     Smokeless tobacco: Former User     Quit date: 2019   Substance Use Topics     Alcohol use: Yes     Comment: not with pregnancy     Drug use: Yes     Types: Marijuana     Comment: not with pregnancy            Review of Systems:   Negative except as noted above in the HPI         Physical Exam:   There were no vitals filed for this visit.  Cardio: RRR  Resp: CTAB  Abdomen: gravid, soft, nt  Cervix: Not assessed  Presentation:Cephalic by Leopold's  Membranes: Intact  Fetal Heart Rate Tracin, moderate, no decels, +accels  Tocometer: Irregular contractions  SSE: Perineum is covered in blood. On speculum exam      Assessment:   Mey Kim is a 39 year old  at 35w1d by 8w1d US admitted with bleeding placenta previa. Given degree of bleeding and gestational age, will proceed with primary  delivery at this time.  I reviewed the risks and benefits of the procedure. Risks reviewed including but are not limited to bleeding, infection, and damage to surrounding structures. Discussed bleeding that can be normal with a  and the possibility of a blood transfusion which patient is agreeable to if needed. We discussed the risk of infection and the use of  pre-operative antibiotics to decrease this risk. We also discussed risk of injury to structures including but not limited to bowel, bladder, ureters, fallopian tubes, ovaries, uterus, pelvic nerves and blood vessels, and baby. Reviewed that this risk is low. If this were to occur I would notify the patient and fix whatever was injured or call the appropriate surgeon to help fix it if I couldn't.         Plan:   -Type and Cross sent for 4 units in case transfusion is needed  -Standard pre-op orders placed including 2 grams of Ancef for Abx PPX  -NICU for delivery given late  gestation    Ashley Hieronimus, MD [facility]  2019 3:05 AM

## 2019-12-16 NOTE — PLAN OF CARE
Data: [facility] presented to [facility]: 2019  2:34 AM.  Reason for maternal/fetal assessment is vaginal bleeding.   Patient reports she noticed she was bleeding around 0100 when she got up to the bathroom. She had bled through her panty liner and underwear. There were also clots in the toilet. Patient reports intermittent lower abdominal cramping.   Patient is a .  Prenatal record reviewed. Pregnancy has been complicated by placenta previa.   Gestational Age 35w1d. VSS. Fetal movement present. Patient denies leaking of vaginal fluid/rupture of membranes, abdominal pain, pelvic pressure, nausea, vomiting, headache, visual disturbances, epigastric or URQ pain, significant edema. Support person is present.   Action: Verbal consent for EFM. Triage assessment completed. Bill of rights reviewed.  Response: Patient verbalized agreement with plan. Will contact Dr Olive Merritt (currently in surgery) or Dr. Karon Eugene with update and further orders. Rosalee Spaulding RN on 2019 at 7:48 AM

## 2019-12-16 NOTE — PLAN OF CARE
Dr. Eugene informed of pt's arrival and current condition. She is in house and will assess pt in person. Rosalee Spaulding RN on 12/16/2019 at 7:53 AM

## 2019-12-16 NOTE — PLAN OF CARE
Murmor and irregular heart rate auscultated. Pt denies shortness of breath, chest pain, or palpitations. Pt does feel dizzy. Dr. Eugene informed of findings by CHACORTA Block. Orders received for an EKG. Rosalee Spaulding RN on 12/16/2019 at 7:50 AM

## 2019-12-16 NOTE — PROVIDER NOTIFICATION
12/16/19 0704   Provider Notification   Provider Name/Title Dr Guerrero   Method of Notification Phone   Request Evaluate-Remote   Notification Reason Other   MD answered back after page;He reviewed 12 EKG and said there is no concern and looks normal.no new orders received.MD said day shift hospitalist will come and assess her. Primary care nurse notified.

## 2019-12-16 NOTE — BRIEF OP NOTE
[facility] Obstetrics Brief Operative Note    Pre-operative diagnosis: IUP at 35w1d  Vaginal bleeding  Complete placenta previa  History of stroke  Chronic hypertension  Anemia  Hemoglobin E disease   Post-operative diagnosis: Same   Procedure: Primary low transverse  section   Surgeon: Karon Eugene MD   Assistant(s): YAIMA Salazar   Anesthesia: Spinal anesthesia   Quantified blood loss: 980 mL   Total IV fluids: 2000 mL   Blood transfusion: No transfusion was given during surgery   Total urine output: (See anesthesia record)   Drains: Hoffman catheter   Specimens: Cord blood and gases, placenta   Findings: Singe liveborn male infant in cephalic presentation with Apgars of 8 and 9. Weight of 2380 grams. Placenta previa noted with anterior extension of placenta though primarily posterior. Placenta somewhat sticky to get out but no obvious accreta. Normal uterus, fallopian tubes, and ovaries.   Complications: None.   Condition: Infant stable, transferred to general care nursery  Mother stable, transfered to post-anesthesia recovery   Comments: See dictated operative report for full details     Ashley Hieronimus, MD [facility]  2019 4:50 AM

## 2019-12-16 NOTE — PLAN OF CARE
Pt currently stable and progressing towards goals within the plan of care.  VSS and assessment WNL.  Hoffman to drainage.  Goals to dangle/ambulate this shift.  Pain well controlled and nausea/vomiting currently stabilized.  Bottle feeding infant.  History of hypertension, BP elevated - MD to start nifedipine.  Pt on tele; currently stable.  Routine cares; will continue to monitor and adjust plan of care accordingly.     Update:  Pt ambulated at side of bed with RN assist.  Tolerated well.  Patients mobililty level scored using the bedside mobility assistance tool (BMAT). Patient is at a mobility level test number: 3. Mobility equipment used: none required. Required assist of 1 staff members. Further use of BMAT scoring required.

## 2019-12-16 NOTE — PLAN OF CARE
Data: Mey Kim transferred to [facility] via cart at 1110. Baby transferred via crib.  Action: Receiving unit notified of transfer: Yes. Patient and family notified of room change. Report given to CHACORTA Gilmore at 1115. Belongings sent to receiving unit. Accompanied by Registered Nurse. Oriented patient to surroundings. Call light within reach. ID bands double-checked with receiving RN.  Response: Patient tolerated transfer and is stable.    Patient distant and flat.  Has not held or fed baby.  FOB not present.  Patient has been sleepy this AM.  Social Work consult placed.    Patients mobililty level scored using the bedside mobility assistance tool (BMAT). Patient is at a mobility level test number: 2. Mobility equipment used: Xunleivermat. Required assist of 2 staff members. Further use of BMAT scoring required.

## 2019-12-16 NOTE — PROVIDER NOTIFICATION
12/16/19 8056   Provider Notification   Provider Name/Title Dr. Purvis    Method of Notification Phone   Request Evaluate-Remote   Notification Reason Vital Signs Change  (Pt history of HTN and daily dose of nifedipine; high BP )   Spoke with Dr. Purvis regarding pt history of HTN and on daily medication.  No dose given today and current BP this evening 155/85.  Requesting dose now to start.  MD to place order.

## 2019-12-16 NOTE — ANESTHESIA POSTPROCEDURE EVALUATION
Patient: Mey Kim    Procedure(s):  PRIMARY  SECTION    Diagnosis:Placenta previa antepartum [O44.00]  Diagnosis Additional Information: No value filed.    Anesthesia Type:  No value filed.    Note:  Anesthesia Post Evaluation    Patient location during evaluation: PACU  Patient participation: Able to fully participate in evaluation  Level of consciousness: awake  Pain management: adequate  Airway patency: patent  Cardiovascular status: acceptable  Respiratory status: acceptable  Hydration status: acceptable  PONV: none             Last vitals:  Vitals:    19 0640 19 0644 19 0700   BP: (!) 150/69  (!) 146/70   Resp:      Temp:      SpO2:  100%          Electronically Signed By: Ahsan Squires MD  2019  7:04 AM

## 2019-12-16 NOTE — PLAN OF CARE
Urine drug screen collected from catheter after surgery and receiving narcotics. Rosalee Spaulding RN on 12/16/2019 at 6:19 AM

## 2019-12-16 NOTE — ANESTHESIA CARE TRANSFER NOTE
Patient: Mey Kim    Procedure(s):  PRIMARY  SECTION    Diagnosis: Placenta previa antepartum [O44.00]  Diagnosis Additional Information: No value filed.    Anesthesia Type:   No value filed.     Note:  Airway :Room Air  Patient transferred to:Labor and Delivery  Handoff Report: Identifed the Patient, Identified the Reponsible Provider, Reviewed the pertinent medical history, Discussed the surgical course, Reviewed Intra-OP anesthesia mangement and issues during anesthesia, Set expectations for post-procedure period and Allowed opportunity for questions and acknowledgement of understanding      Vitals: (Last set prior to Anesthesia Care Transfer)    CRNA VITALS  2019 0422 - 2019 0455      2019             EKG:  Sinus rhythm                Electronically Signed By: EDIN Frey CRNA  2019  4:55 AM

## 2019-12-17 LAB
COPATH REPORT: NORMAL
ERYTHROCYTE [DISTWIDTH] IN BLOOD BY AUTOMATED COUNT: 16.6 % (ref 10–15)
HCT VFR BLD AUTO: 25 % (ref 35–47)
HGB BLD-MCNC: 8.3 G/DL (ref 11.7–15.7)
INTERPRETATION ECG - MUSE: NORMAL
MCH RBC QN AUTO: 21.2 PG (ref 26.5–33)
MCHC RBC AUTO-ENTMCNC: 33.2 G/DL (ref 31.5–36.5)
MCV RBC AUTO: 64 FL (ref 78–100)
PLATELET # BLD AUTO: 210 10E9/L (ref 150–450)
RBC # BLD AUTO: 3.92 10E12/L (ref 3.8–5.2)
WBC # BLD AUTO: 16.7 10E9/L (ref 4–11)

## 2019-12-17 PROCEDURE — 25000128 H RX IP 250 OP 636: Performed by: OBSTETRICS & GYNECOLOGY

## 2019-12-17 PROCEDURE — 85027 COMPLETE CBC AUTOMATED: CPT | Performed by: OBSTETRICS & GYNECOLOGY

## 2019-12-17 PROCEDURE — 25000132 ZZH RX MED GY IP 250 OP 250 PS 637: Performed by: OBSTETRICS & GYNECOLOGY

## 2019-12-17 PROCEDURE — 36415 COLL VENOUS BLD VENIPUNCTURE: CPT | Performed by: OBSTETRICS & GYNECOLOGY

## 2019-12-17 PROCEDURE — 12000000 ZZH R&B MED SURG/OB

## 2019-12-17 RX ORDER — ASPIRIN 81 MG/1
81 TABLET, CHEWABLE ORAL DAILY
Status: DISCONTINUED | OUTPATIENT
Start: 2019-12-17 | End: 2019-12-20 | Stop reason: HOSPADM

## 2019-12-17 RX ORDER — FERROUS SULFATE 325(65) MG
325 TABLET ORAL 2 TIMES DAILY
Status: DISCONTINUED | OUTPATIENT
Start: 2019-12-17 | End: 2019-12-20 | Stop reason: HOSPADM

## 2019-12-17 RX ORDER — ROSUVASTATIN CALCIUM 20 MG/1
20 TABLET, COATED ORAL DAILY
Status: DISCONTINUED | OUTPATIENT
Start: 2019-12-17 | End: 2019-12-17

## 2019-12-17 RX ORDER — NIFEDIPINE 30 MG/1
30 TABLET, EXTENDED RELEASE ORAL DAILY
Status: DISCONTINUED | OUTPATIENT
Start: 2019-12-17 | End: 2019-12-20

## 2019-12-17 RX ORDER — ASPIRIN 81 MG/1
81 TABLET, CHEWABLE ORAL DAILY
Status: DISCONTINUED | OUTPATIENT
Start: 2019-12-17 | End: 2019-12-17

## 2019-12-17 RX ADMIN — KETOROLAC TROMETHAMINE 30 MG: 30 INJECTION, SOLUTION INTRAMUSCULAR at 01:00

## 2019-12-17 RX ADMIN — IBUPROFEN 800 MG: 800 TABLET ORAL at 08:22

## 2019-12-17 RX ADMIN — ACETAMINOPHEN 975 MG: 325 TABLET, FILM COATED ORAL at 21:29

## 2019-12-17 RX ADMIN — ACETAMINOPHEN 975 MG: 325 TABLET, FILM COATED ORAL at 13:25

## 2019-12-17 RX ADMIN — SENNOSIDES AND DOCUSATE SODIUM 1 TABLET: 8.6; 5 TABLET ORAL at 08:22

## 2019-12-17 RX ADMIN — ACETAMINOPHEN 975 MG: 325 TABLET, FILM COATED ORAL at 05:24

## 2019-12-17 RX ADMIN — IBUPROFEN 800 MG: 800 TABLET ORAL at 21:29

## 2019-12-17 RX ADMIN — SENNOSIDES AND DOCUSATE SODIUM 1 TABLET: 8.6; 5 TABLET ORAL at 21:30

## 2019-12-17 RX ADMIN — NIFEDIPINE 30 MG: 30 TABLET, FILM COATED, EXTENDED RELEASE ORAL at 18:04

## 2019-12-17 RX ADMIN — IBUPROFEN 800 MG: 800 TABLET ORAL at 15:15

## 2019-12-17 RX ADMIN — FERROUS SULFATE TAB 325 MG (65 MG ELEMENTAL FE) 325 MG: 325 (65 FE) TAB at 21:29

## 2019-12-17 RX ADMIN — ASPIRIN 81 MG 81 MG: 81 TABLET ORAL at 08:33

## 2019-12-17 RX ADMIN — FERROUS SULFATE TAB 325 MG (65 MG ELEMENTAL FE) 325 MG: 325 (65 FE) TAB at 08:22

## 2019-12-17 NOTE — PLAN OF CARE
VSS. Up to bathroom, garcia removed. Telemetry monitoring discontinued per provider's orders. Patient denies pain this shift. Bottle feeding infant, infant in nursery overnight.     Patients mobililty level scored using the bedside mobility assistance tool (BMAT). Patient is at a mobility level test number: 4. Mobility equipment used: none required. Required assist of 0 staff members. Further use of BMAT scoring not required.

## 2019-12-17 NOTE — PLAN OF CARE
Pt currently stable and progressing towards goals within the plan of care.  VSS and assessment WNL.  Voiding and has had BM post surgery.  Pain well controlled.  Bottle feeding infant; pt states desires to maybe breastfeed/pump.  Encouraged pt to continue with breast stimulation for goals.  Positive bonding observed with infant.  Routine cares; will continue to monitor and adjust plan of care accordingly.

## 2019-12-17 NOTE — PROGRESS NOTES
Hospitalist consult follow-up    Echocardiogram from yesterday showing mild mitral regurgitation which is similar to echocardiogram from 2018 showing trace to mild mitral regurgitation.  This is the etiology for the systolic murmur heard on exam.  Does not need any further work-up for this at this time.  She should follow-up with her primary care doctor and repeat echocardiogram in 1 year to make sure it is stable.  Blood pressure was elevated overnight which improved with receiving her daily nifedipine 30 mg which she should continue on discharge.  OB has also okayed her to resume her 81 mg aspirin daily which she should continue on discharge for previous history of stroke of unclear etiology.  It appears that the telemetry was discontinued this morning so the information is no longer available for me to review for the irregularly sounding heart rhythm for the nurse in part generating the consult.  I did review approximately 6 hours of telemetry yesterday that showed normal sinus rhythm without any concerning findings.  No further work-up for this either.    -Continue aspirin and nifedipine daily on discharge at previous doses  -Follow-up with primary care physician and repeat TTE in 1 years time  -All other care is deferred to OB team including disposition    Hospitalist team to sign off, if any questions today please page me directly

## 2019-12-17 NOTE — LACTATION NOTE
This note was copied from a baby's chart.  EMILY visited [facility] [place] this [place]. She is interested in pumping and offering her baby some EBM as well as open to some nursing.  She is able to get some drops with HE.  EMILY assisted with a latch attempt, however infant was too sleepy after formula feeding to show any interest.  She was up in the room, FOB present, both engaged in infant cares and asking appropriate questions about infant cares such as burping, swaddling, holding infant, milk volumes, and about infant's fontanelle.  Bonding was noted.  She placed infant to breast out of her own desire, not lactation led. All questions were answered.  Patient was encouraged to pump at least every 3 hours.  Plan for continued support.

## 2019-12-17 NOTE — PLAN OF CARE
Pt's BP is elevated (see flow sheets), pt on Nifedipine daily, denies PIH s/s, updated Dr Eugene  verbally about pt status, no new orders, voiding without difficulties; pt also wants to breast feed, seen by lactation RN, infant was disinterested at that time; breast pump in room, encouraged pt to hand express and pump; incisional pain is controlled with pharmacological interventions, good bonding observed with infant, continue to monitor.

## 2019-12-17 NOTE — PROGRESS NOTES
SW followed up with pt today, as the pt was very tired during the initial assessment. Pt was notified that if the baby comes back positive for marijuana, that there will be a CPS report made. Pt reported she was aware. Writer asked pt about history of depression and pt stated she has never had a history of depression and that she has never had treatment for depression. The pt stated she has no concerns or questions.    JANETH Moura  [facility]  [phone]

## 2019-12-17 NOTE — PROGRESS NOTES
[facility] Obstetrics  Postoperative Note    S: Doing well. Pain is controlled. Bleeding is moderate and improving. She is ambulating without difficulty. Tolerating a regular diet. Eating without issues. Voiding spontaneously. She denies HA, vision changes, CP, or SOB. Denies other concerns. Was previously planning to bottle feed but is now reporting desires to breast feed.    O:  Vitals:    19 1621 19 2357 19 0500   BP: (!) 155/85 (!) 158/90 122/82 129/72   Pulse: 76  90 91   Resp:    Temp: 98.2  F (36.8  C) 98.6  F (37  C) 98.1  F (36.7  C) 98.4  F (36.9  C)   TempSrc: Oral Oral Oral Oral   SpO2: 100% 100% 99% 97%       Gen: Alert, NAD  Cardio: RRR, no m/g/r  Resp: CTAB  Abdomen: soft, appropriately tender, non-distended, fundus firm at the umbilicus  Incision: c/d/i, no erythema or drainage  Extremities: No edema, non-tender    Hemoglobin   Date Value Ref Range Status   2019 9.7 (L) 11.7 - 15.7 g/dL Final   2019 9.5 (L) 11.7 - 15.7 g/dL Final       A: 39 year old  POD#1 s/p PLTCS for bleeding placenta previa. Pregnancy is additionally complicated by history of stroke, chronic hypertension, and hemoglobin E disease with anemia. Additionally, patient was noted to have an irregular heart rate with possible heart murmur in the post-operative period.     P:  Heme: Hemoglobin E disease with anemia. Surgical QBL was 980 mL. Hgb stable yesterday but short interval between checks. Repeat pending this morning. Iron started today.  Pain: Controlled. Transition to PO meds  CV: Chronic hypertension. BPs up last night but had not been restarted on meds yet. Did get a dose of Procardia last night. Normal dose to resume today. Patient normally takes at night so will continue on home schedule. Continue to monitor. Irregular heart beat/murmur noted post-op. S/p EKG, TTE, hospitalist consult. Notable for mitral regurgitation which is not a new diagnosis.  Normal EKG. Per medicine, plan for PCP follow up and repeat TTE in one year. Otherwise continue ASA and Procardia  GI: Regular diet, bowel regimen  : discontinue garcia this morning. Await spontaneous voiding  Neuro: H/o stroke: Continue ASA. Per Neurology, recommended restarting statin post delivery but patient is now planning to breast feed so will hold off for now.  Activity: Encourage ambulation  Rh status: Positive  Rubella status: Immune  Contraception: Undecided. Had considered sterilization but then declined later in pregnancy. Will discuss prior to discharge.  Feeding: Bottle.  Dispo: Anticipate POD#3-4    Ashley Hieronimus, MD [facility]  12/17/2019 7:18 AM

## 2019-12-18 PROCEDURE — 25000132 ZZH RX MED GY IP 250 OP 250 PS 637: Performed by: OBSTETRICS & GYNECOLOGY

## 2019-12-18 PROCEDURE — 12000000 ZZH R&B MED SURG/OB

## 2019-12-18 RX ADMIN — OXYCODONE HYDROCHLORIDE 5 MG: 5 TABLET ORAL at 04:30

## 2019-12-18 RX ADMIN — IBUPROFEN 800 MG: 800 TABLET ORAL at 04:11

## 2019-12-18 RX ADMIN — IBUPROFEN 800 MG: 800 TABLET ORAL at 10:09

## 2019-12-18 RX ADMIN — NIFEDIPINE 30 MG: 30 TABLET, FILM COATED, EXTENDED RELEASE ORAL at 19:01

## 2019-12-18 RX ADMIN — FERROUS SULFATE TAB 325 MG (65 MG ELEMENTAL FE) 325 MG: 325 (65 FE) TAB at 10:09

## 2019-12-18 RX ADMIN — ASPIRIN 81 MG 81 MG: 81 TABLET ORAL at 10:09

## 2019-12-18 RX ADMIN — IBUPROFEN 800 MG: 800 TABLET ORAL at 21:19

## 2019-12-18 RX ADMIN — ACETAMINOPHEN 975 MG: 325 TABLET, FILM COATED ORAL at 21:19

## 2019-12-18 RX ADMIN — ACETAMINOPHEN 975 MG: 325 TABLET, FILM COATED ORAL at 13:35

## 2019-12-18 RX ADMIN — SENNOSIDES AND DOCUSATE SODIUM 1 TABLET: 8.6; 5 TABLET ORAL at 10:09

## 2019-12-18 RX ADMIN — FERROUS SULFATE TAB 325 MG (65 MG ELEMENTAL FE) 325 MG: 325 (65 FE) TAB at 21:00

## 2019-12-18 RX ADMIN — ACETAMINOPHEN 975 MG: 325 TABLET, FILM COATED ORAL at 05:56

## 2019-12-18 NOTE — PLAN OF CARE
Pt bonding well with infant. Denies headache, SOB, chest pain, dizziness, and changes in vision. Ibuprofen, tylenol, and oxycodone for pain management. Ambulating independently, voiding without difficulty. One higher /92, Bps since have been stable. Tpump and binder given for comfort. IV saline locked.     Love Ricks RN on 12/18/2019 at 6:23 AM

## 2019-12-18 NOTE — LACTATION NOTE
This note was copied from a baby's chart.  LC visit to assist with latch attempt.  Poor feeding noted.  SNS used as well as a shield and 22 KCal.  He took about 2 mL at breast.  We then moved to bottle feeding him.  He requires chin support and stimulation to swallow.  Feeding concerns present overall.  Pumping was encouraged.  No longer than 10 min latch attempts and not more than every other feeding.

## 2019-12-18 NOTE — LACTATION NOTE
This note was copied from a baby's chart.  LC visit.  She has been formula feeding infant and occasionally pumping.  Has pumped x3 so far.  LC reviewed the importance of pumping every 2-3 hours with a goal of 8-12 pump sesssions or breastfeeding sessions in 24 hours.  She would like to attempt to latch infant for the next feeding. She will call when ready to nurse.

## 2019-12-18 NOTE — PLAN OF CARE
UAL. Afebrile. Blood pressures remain in the 140's/ 70-90's range. Denies headache, visual disturbances or epigastric pain. Has taken Tylenol and Ibuprofen for incisional discomfort and states this has been adequate. States she is voiding without difficulty. Had a BM yesterday. Has been formula feeding but state she is wanting to put baby to breast and possibly pump and  Bottle feed breast milk.  Discussed need to use electric breast pump and attempt to put baby to breast. LC consult.

## 2019-12-18 NOTE — PROGRESS NOTES
Patient Name:  Mey Kim   MRN:  [id number]  Age:  39 year old    YOB: 1980      POSTPARTUM/POST-OPERATIVE PROGRESS NOTE    Pt is POD#2 s/p C/S.  She is doing well without complaints.  Pt is ambulating and tolerating a regular diet.  Hoffman is out and she is voiding without complication.  Pain is well controlled with PO medication and lochia is within normal limits.  She is breastfeeding.  Baby is doing well. She denies headache, vision change, CP, SOB, palpitations, chest pressure, n/v, abdominal pain, or increased swelling.      Objective:    Temp:  [98  F (36.7  C)-98.3  F (36.8  C)] 98.3  F (36.8  C)  Pulse:  [80] 80  Heart Rate:  [83-92] 83  Resp:  [16-18] 16  BP: (140-159)/(73-92) 147/73  0 lbs 0 oz      General Appearance:  NAD, A&O x 3, comfortable  Lungs:  unlabored  Cardiovascular:  RRR  Abdomen:  soft, minimally distended; appropriately tender near incision; no rebound or guarding  Fundus:  firm, below the umbilicus  Incision:  clean, dry and intact  Lower extremities:  no significant edema      Lab Review:    Hemoglobin   Date Value Ref Range Status   2019 8.3 (L) 11.7 - 15.7 g/dL Final   2019 9.7 (L) 11.7 - 15.7 g/dL Final   2019 9.5 (L) 11.7 - 15.7 g/dL Final     Hematocrit   Date Value Ref Range Status   2019 25.0 (L) 35.0 - 47.0 % Final   2019 28.9 (L) 35.0 - 47.0 % Final   2019 28.2 (L) 35.0 - 47.0 % Final           Assessment:  38yo  POD#2 s/p Primary  at 35+1 wga for vaginal bleeding in setting of complete placenta previa    Plan:  - Post-op: recovering well. Cont PO pain meds and regular diet. Encourage ambulation.  - CHTN and heart murmur (mitral regurgitation): no s/sx of pre-eclampsia. Cont Nifedipine XR 30mg daily and ASA.  Appreciate Hospitalist consult - EKG normal.  Will f/u with PCP and have repeat TTE in 1 year.  - h/o CVA: cont ASA. Neuro recommended re-starting statin, but patient is breast feeding  - Hgb E  disease: asymptomatic anemia. No indication for transfusion. Cont iron supplement  - Contraception: declines; will decide at postpartum visit  - Dispo: Anticipate DC on POD#3 or #4.          Meredith Chan, MD [facility]  12/18/19 08:39am

## 2019-12-19 PROCEDURE — 12000000 ZZH R&B MED SURG/OB

## 2019-12-19 PROCEDURE — 25000132 ZZH RX MED GY IP 250 OP 250 PS 637: Performed by: OBSTETRICS & GYNECOLOGY

## 2019-12-19 RX ADMIN — SENNOSIDES AND DOCUSATE SODIUM 1 TABLET: 8.6; 5 TABLET ORAL at 09:15

## 2019-12-19 RX ADMIN — FERROUS SULFATE TAB 325 MG (65 MG ELEMENTAL FE) 325 MG: 325 (65 FE) TAB at 09:15

## 2019-12-19 RX ADMIN — ASPIRIN 81 MG 81 MG: 81 TABLET ORAL at 09:15

## 2019-12-19 RX ADMIN — NIFEDIPINE 30 MG: 30 TABLET, FILM COATED, EXTENDED RELEASE ORAL at 19:20

## 2019-12-19 RX ADMIN — ACETAMINOPHEN 975 MG: 325 TABLET, FILM COATED ORAL at 05:35

## 2019-12-19 RX ADMIN — IBUPROFEN 800 MG: 800 TABLET ORAL at 09:15

## 2019-12-19 RX ADMIN — IBUPROFEN 800 MG: 800 TABLET ORAL at 23:20

## 2019-12-19 RX ADMIN — IBUPROFEN 800 MG: 800 TABLET ORAL at 02:52

## 2019-12-19 RX ADMIN — SENNOSIDES AND DOCUSATE SODIUM 1 TABLET: 8.6; 5 TABLET ORAL at 21:38

## 2019-12-19 RX ADMIN — FERROUS SULFATE TAB 325 MG (65 MG ELEMENTAL FE) 325 MG: 325 (65 FE) TAB at 21:38

## 2019-12-19 RX ADMIN — ACETAMINOPHEN 650 MG: 325 TABLET, FILM COATED ORAL at 21:48

## 2019-12-19 NOTE — LACTATION NOTE
Continued poor feedings were reported over the night.  Infant transferred to NICU today.  LC discussed the importance of frequent pumping to help bring her milk in. All questions were answered.  NICU Lactation will continue to follow and work with couplet.

## 2019-12-19 NOTE — PLAN OF CARE
Data: Vital signs within normal limits, except BP which was slightly elevated but patient has a history of chronic hypertension and is on daily Procardia XL. Postpartum checks within normal limits see flow record. Patient eating and drinking normally. Patient able to empty bladder independently and is up ambulating. No apparent signs of infection. Incision healing well. Patient performing self cares and is able to care for infant.  Action: Patient medicated during the shift for prevention of pain symptoms per her request . See MAR. Patient reassessed within 1 hour after each medication and patient appeared comfortable and was sleeping. Patient education done about safe sleep, frequent feedings, pain prevention and control, and self care.  See flow record.  Response: Positive attachment behaviors observed with infant. Support person were not  present.   Plan: Anticipate discharge on 12/19/2019.

## 2019-12-19 NOTE — PLAN OF CARE
BP's remain elevated but stable, tacho flow sheet. Denies headache, visual disturbances, or epigastric pain. Ambulating in room, tolerating regular diet, denies difficulty voiding. Ibuprofen and scheduled tylenol for pain with stated relief.Becoming more independent with infant cares, alternating formula feeds with breast feeding using shield and SNS. FOB present and supportive most of shift, involved in infant cares.

## 2019-12-19 NOTE — PROGRESS NOTES
[facility]   Brief Post-partum Note    Name:  Mey Kim  MRN: [id number]    S: Patient is doing OK.  Baby was just transferred to NICU. Pain is controlled.  Tolerating regular diet without nausea or vomiting.  Having some chest tightness/acid reflux when taking Motrin.  Hasn't been taking with food. Ambulating without dizziness.  Voiding spontaneously, passing flatus but no bowel movement as of yet. Lochia less than menses.  Bottle feeding.  Plans discharge tomorrow at earliest.    O:   Patient Vitals for the past 24 hrs:   BP Temp Temp src Pulse Heart Rate Resp   19 0901 (!) 144/86 98.2  F (36.8  C) Oral -- 85 16   19 0248 (!) 149/79 98.3  F (36.8  C) Oral 86 -- 16   19 1900 (!) 147/86 98.2  F (36.8  C) Oral 78 -- 18   / 1647 (!) 141/82 98.4  F (36.9  C) Oral 82 -- 18   18/19 1155 (!) 149/96 97.9  F (36.6  C) Oral 86 -- 18     Gen:  Resting comfortably, NAD  CV:  Regular rate and rhythm   Pulm:  Non-labored breathing.  No cough or wheezing.   Abd:  Obese Soft, appropriately tender to palpation, non-distended.  Fundus at -1 umbilicus, firm and non-tender.  Incision: Overlying glue present.  Wound edges well approximately, no findings of erythema or induration.  Ext:  Non-tender, trace LE edema b/l      Assessment/Plan:  39 year old  on POD #3 s/p PLTCS for bleeding placenta previa. Pregnancy is additionally complicated by history of stroke, chronic hypertension, and hemoglobin E disease with anemia. Additionally, patient was noted to have an irregular heart rate with possible heart murmur in the post-operative period. Continue with routine postpartum management.     Heme: Hemoglobin E disease with anemia. Hgb 9.5>9.7>QBL 980cc>8.3.  Will discharge home with PO iron.  Pain: Controlled with PO medications  CV:   - Chronic hypertension; on PTA Procardia 30 mg.  BPs high mild range; given patient complex PMH will plan to observe today with discharge home on POD#4.    - Irregular heart beat/murmur noted post-op. S/p EKG, TTE, hospitalist consult. Notable for mitral regurgitation which is not a new diagnosis. Normal EKG. Per medicine, plan for PCP follow up and repeat TTE in one year. Otherwise continue ASA and Procardia.  GI: Regular diet, bowel regimen  : S/p garcia  Neuro: H/o stroke: Continue ASA.   Activity: Encourage ambulation  Rh status: Positive  Rubella status: Immune  Contraception: Still undecided regarding contraception; discussed we would address this further at her blood pressure follow-up appointment next week.  Declines written information about options.   Feeding: Bottle.    Dispo: Plan for home on POD#4    Sobia Junior MD   Pager: [phone]   December 19, 2019

## 2019-12-19 NOTE — PLAN OF CARE
BP readings this shift 144/86 and 147/86, patient has been downstairs most of this shift as baby transferred to NICU this morning. Incision site dry and intact. Patient does have a flat affect, very quiet. SWS consult entered for NICU admission.

## 2019-12-20 VITALS
SYSTOLIC BLOOD PRESSURE: 141 MMHG | TEMPERATURE: 98.6 F | HEART RATE: 89 BPM | OXYGEN SATURATION: 97 % | RESPIRATION RATE: 18 BRPM | DIASTOLIC BLOOD PRESSURE: 93 MMHG

## 2019-12-20 LAB
BLD PROD TYP BPU: NORMAL
BLD UNIT ID BPU: 0
BLOOD PRODUCT CODE: NORMAL
BPU ID: NORMAL
TRANSFUSION STATUS PATIENT QL: NORMAL

## 2019-12-20 PROCEDURE — 25000132 ZZH RX MED GY IP 250 OP 250 PS 637: Performed by: OBSTETRICS & GYNECOLOGY

## 2019-12-20 RX ORDER — NIFEDIPINE 30 MG/1
30 TABLET, EXTENDED RELEASE ORAL ONCE
Status: COMPLETED | OUTPATIENT
Start: 2019-12-20 | End: 2019-12-20

## 2019-12-20 RX ORDER — NIFEDIPINE 30 MG/1
30 TABLET, EXTENDED RELEASE ORAL DAILY
Status: COMPLETED | OUTPATIENT
Start: 2019-12-20 | End: 2019-12-20

## 2019-12-20 RX ORDER — OXYCODONE HYDROCHLORIDE 5 MG/1
5-10 TABLET ORAL EVERY 6 HOURS PRN
Qty: 20 TABLET | Refills: 0 | Status: ON HOLD | OUTPATIENT
Start: 2019-12-20 | End: 2020-03-28

## 2019-12-20 RX ORDER — NIFEDIPINE 30 MG/1
60 TABLET, EXTENDED RELEASE ORAL DAILY
Status: DISCONTINUED | OUTPATIENT
Start: 2019-12-20 | End: 2019-12-20 | Stop reason: HOSPADM

## 2019-12-20 RX ORDER — NIFEDIPINE 30 MG/1
60 TABLET, EXTENDED RELEASE ORAL DAILY
COMMUNITY
Start: 2019-12-20

## 2019-12-20 RX ORDER — AMOXICILLIN 250 MG
1 CAPSULE ORAL 2 TIMES DAILY PRN
Qty: 60 TABLET | Refills: 1 | Status: ON HOLD | OUTPATIENT
Start: 2019-12-20 | End: 2020-03-28

## 2019-12-20 RX ORDER — ACETAMINOPHEN 325 MG/1
325-650 TABLET ORAL EVERY 6 HOURS PRN
Qty: 100 TABLET | Refills: 0 | Status: ON HOLD | OUTPATIENT
Start: 2019-12-20 | End: 2020-03-28

## 2019-12-20 RX ADMIN — NIFEDIPINE 30 MG: 30 TABLET, FILM COATED, EXTENDED RELEASE ORAL at 08:31

## 2019-12-20 RX ADMIN — NIFEDIPINE 30 MG: 30 TABLET, FILM COATED, EXTENDED RELEASE ORAL at 00:29

## 2019-12-20 RX ADMIN — FERROUS SULFATE TAB 325 MG (65 MG ELEMENTAL FE) 325 MG: 325 (65 FE) TAB at 08:19

## 2019-12-20 RX ADMIN — ACETAMINOPHEN 650 MG: 325 TABLET, FILM COATED ORAL at 08:19

## 2019-12-20 RX ADMIN — ASPIRIN 81 MG 81 MG: 81 TABLET ORAL at 08:19

## 2019-12-20 RX ADMIN — SENNOSIDES AND DOCUSATE SODIUM 1 TABLET: 8.6; 5 TABLET ORAL at 08:19

## 2019-12-20 RX ADMIN — ACETAMINOPHEN 650 MG: 325 TABLET, FILM COATED ORAL at 02:53

## 2019-12-20 NOTE — PROVIDER NOTIFICATION
BP elevated: 160/91, recheck 156/96. No s/s PIH.     New orders from Dr. Bautista:  Give Procardia XL 30 mg PO once.  Do not need to check BP again until morning.

## 2019-12-20 NOTE — PLAN OF CARE
BPs elevated overnight, see flow sheet.  MD notified and an additional dose of Procardia was ordered, see previous note and MAR.  Denies headache, RUQ pain, blurry vision.  Refluxes WNL, no clonus.  Pain well controlled.  Incision: WNL    Fundal checks and bleeding WNL.   Voiding without difficulty; frequent voiding encouraged.  +BS.  Passing flatus. Tolerating regular diet.  Patient did not go to NICU overnight so she could sleep.  Breasts are engorged.  Education provided on how to prevent engorgement. Patient verbalized understanding. Patient pumped overnight with good results. Continue to monitor.

## 2019-12-20 NOTE — DISCHARGE SUMMARY
DISCHARGE SUMMARY     Day of Admission:  2019     Date discharged:  2019    Admitting diagnosis:   IUP 35 1/7 weeks  Vaginal bleeding, placenta previa  Hx stroke  Hx hemoglobin E with anemia  Chronic hypertension, on nifedipine 60 XL daily    Discharge diagnosis:   S/p primary LST c/s with 2 layer closure    Narrative:    Mey Kim is a 39 year old  at 35 1/7 weeks gestation admitted on 19 for heavy vaginal bleeding in setting of known placenta previa.  See prenatal chart for prenatal details.    Hospital Course:    Mey Kim was admitted and primary low transverse  section with two layer closure performed.     She was delivered of a viable male weighing 2380g with apgars 8/9.  She had an uneventful post operative course, although blood pressures were elevated. She was given nifedipine 30 XL daily, and on POD #3 was given an extra dose of 30 XL for elevated pressure.  This morning BP is still 150/92, so she will be given an extra dose of 30 XL.  She will then return to her pre-admission dose of   60 mg XL in the afternoon, daily .  Post operative hgb was 8.3, down from 9.7 on admission      She was tolerating a regular diet, and deemed ready for discharge today.    Discharge medications are PNV, oxycodone, senna S, nifedipine 60 mg XL daily, tylenol, and aspirin 81 mg daily.  She is not taking ibuprofen due to side effects.    She is instructed to lift no more than 25# for 6 weeks, no driving x1 week and off narcotics, and pelvic rest advised for 6 weeks.  She is instructed to take her BP TID and record, and call to report any headache or dizziness.  Goal of BP for now is <150 systolic, <105 diastolic, with ultimate goal of <140/80s.  She is to  make a follow up appt in clinic to review BPs on Monday or Tuesday of next week, again in 2 weeks for BP f/u, and then with her primary OB physician in 6 weeks.      Active Problems:    Hemorrhage from placenta previa  (12/16/2019)      Hanna Vitale MD December 20, 2019

## 2019-12-20 NOTE — CONSULTS
Brief SW note:    D) SWS responding to consult for NICU admission  I) Reviewed Chart. Initial SW consult completed 12/17 prior to baby being transferred to the NICU. Met with MOB. NICU Welcome Card with SWS contact information. MOB completed EPDS and scored a 3. Denies any current symptoms, no thoughts of harm.   A) SWS did not observe parent/infant bonding at this time as baby was down in NICU.  P) SWS will be available and continue to follow family while baby is in NICU.      JANETH Robert   Inpatient Care Coordination  [facility]   [phone]

## 2019-12-20 NOTE — PROGRESS NOTES
Public Health Nurse (PHN) did not meet with patient today to discuss resources due to infant in NICU.  PHN will meet with patient closer to infant's discharge.

## 2019-12-20 NOTE — PLAN OF CARE
Pt up and odalis. Voiding without difficulty. Tylenol effective for pain management, ibuprofen being used intermediately patient complains of pain when taking. Fundus firm and midline. Incision site approximated- liquid bandage intact. Pumping with minimal assistance. No pump needed, patient has one at home. BC and PPD completed. Visiting baby in NICU. Blood pressures continue to be slightly elevated, Md aware, additional dose of procardia given per Md recommendation. Md updated with blood pressure, Md okay with discharge. Patient to continue taking procardia as scheduled. Patient to take blood pressures 2-3 times a day with at home blood pressure cuff. Medications sent home with patient. Patient would like to room and board. Discharge instructions explained and all questions and concerns answered.  Md recommends follow-up in 2 weeks and again at 6 weeks. Md would like follow-up in clinic on Monday or Tuesday for a blood pressure check and to bring blood pressure log with. Md would like to be contacted if blood pressures are above 160 and or 105. Adequate for discharge. Discharged at 1117 room and board.     Glory Caraballo RN

## 2019-12-20 NOTE — PROVIDER NOTIFICATION
12/20/19 0917   Provider Notification   Provider Name/Title Dr. Ponce   Method of Notification Phone   Request Evaluate-Remote   Notification Reason Vital Signs Change   Md notified about blood pressure 45 minutes after procardia 30 mg given. Md okay to continue with discharge. Md would like patient to take scheduled procardia tonight.     Glory Caraballo, RN

## 2019-12-20 NOTE — PLAN OF CARE
Spent much of shift in NICU with baby. Pumping with minimal results, consistency encouraged. BP's remain elevated but stable, denies HA, visual disturbances, or epigastric pain. Ibuprofen and tylenol for pain with stated relief. Ambulating in halls, tolerating regular diet, denies difficulty voiding. No visitors noted this shift.

## 2019-12-20 NOTE — DISCHARGE INSTRUCTIONS
Dear Mey-    Congratulations on your new baby!    A few limitations after  section:    No tampons/douching/intercourse x 6 weeks  No lifting more than 25-30# for 6 weeks.  It takes that long for full strength healing  No driving for at least a week, or until you are off narcotics and sure you can handle the vehicle without limitation.    Pain Medications:  Tylenol 325-650 mg every 6 hours as needed.  Aspirin 81 mg daily  Oxycodone 5-10 mg every 6 hours as needed for severe pain  Stool softener twice daily as needed  Nifedipine 60 mg XL daily  DO NOT TAKE YOUR CRESTOR while breastfeeding, but resume use when you are done breastfeeding.  Continue prenatal vitamins and iron, to correct your anemia.      Please take and record your blood pressure 3x daily.  Please make appt to be seen in clinic Monday or  or  and bring in your blood pressures to review.  Please follow up again in 2 weeks for BP check, and then finally, in 6 weeks with your OB provider for final post op check.    Goal of blood pressure is systolic <150 and diastolic <90.    Call if BP >160 systolic or >105 diastolic, if severe headache, or any questions.  Eventual goal in BP management is 110-130/60-80.    Call if fever, heavy bleeding, worsening pain, or any questions!    Congratulations--    Hanna Vitale MD 2019     LACTATION: [phone]    Preeclampsia   Call your doctor right away if you have any of the following:  - Edema (swelling) in your face or hands  - Rapid weight gain-about 1 pound or more in a day  - Headache  - Abdominal pain on your right side  - Vision problems (flashes or spots)  - You have questions or concerns once you return home.        Postop  Birth Instructions      Activity       Do not lift more than 10 pounds for 6 weeks after surgery.  Ask family and friends for help when you need it.    No driving until you have stopped taking your pain medications (usually two weeks  after surgery).    No heavy exercise or activity for 6 weeks.  Don't do anything that will put a strain on your surgery site.    Don't strain when using the toilet.  Your care team may prescribe a stool softener if you have problems with your bowel movements.     To care for your incision:       Keep the incision clean and dry.    Do not soak your incision in water. No swimming or hot tubs until it has fully healed. You may soak in the bathtub if the water level is below your incision.    Do not use peroxide, gel, cream, lotion, or ointment on your incision.    Adjust your clothes to avoid pressure on your surgery site (check the elastic in your underwear for example).     You may see a small amount of clear or pink drainage and this is normal.  Check with your health care provider:       If the drainage increases or has an odor.    If the incision reddens, you have swelling, or develop a rash.    If you have increased pain and the medicine we prescribed doesn't help.    If you have a fever above 100.4 F (38 C) with or without chills when placing thermometer under your tongue.   The area around your incision (surgery wound), will feel numb.  This is normal. The numbness should go away in less than a year.     Keep your hands clean:  Always wash your hands before touching your incision (surgery wound). This helps reduce your risk of infection. If your hands aren't dirty, you may use an alcohol hand-rub to clean your hands. Keep your nails clean and short.    Call your healthcare provider if you have any of these symptoms:       You soak a sanitary pad with blood within 1 hour, or you see blood clots larger than a golf ball.    Bleeding that lasts more than 6 weeks.    Vaginal discharge that smells bad.    Severe pain, cramping or tenderness in your lower belly area.    A need to urinate more frequently (use the toilet more often), more urgently (use the toilet very quickly), or it burns when you urinate.    Nausea  and vomiting.    Redness, swelling or pain around a vein in your leg.    Problems breastfeeding or a red or painful area on your breast.    Chest pain and cough or are gasping for air.    Problems with coping with sadness, anxiety or depression. If you have concerns about hurting yourself or the baby, call your provider immediately.      You have questions or concerns after you return home.

## 2019-12-21 LAB
BLD PROD TYP BPU: NORMAL
BLD UNIT ID BPU: 0
BLOOD PRODUCT CODE: NORMAL
BPU ID: NORMAL
TRANSFUSION STATUS PATIENT QL: NORMAL
TRANSFUSION STATUS PATIENT QL: NORMAL

## 2020-03-27 ENCOUNTER — APPOINTMENT (OUTPATIENT)
Dept: MRI IMAGING | Facility: CLINIC | Age: 40
End: 2020-03-27
Attending: EMERGENCY MEDICINE
Payer: COMMERCIAL

## 2020-03-27 ENCOUNTER — HOSPITAL ENCOUNTER (INPATIENT)
Facility: CLINIC | Age: 40
LOS: 1 days | Discharge: HOME | End: 2020-03-28
Attending: EMERGENCY MEDICINE | Admitting: INTERNAL MEDICINE
Payer: COMMERCIAL

## 2020-03-27 DIAGNOSIS — I63.9 CEREBROVASCULAR ACCIDENT (CVA), UNSPECIFIED MECHANISM (H): ICD-10-CM

## 2020-03-27 DIAGNOSIS — I63.81 ACUTE LACUNAR INFARCTION (H): Primary | ICD-10-CM

## 2020-03-27 LAB
ANION GAP SERPL CALCULATED.3IONS-SCNC: 4 MMOL/L (ref 3–14)
ANISOCYTOSIS BLD QL SMEAR: ABNORMAL
BASOPHILS # BLD AUTO: 0.1 10E9/L (ref 0–0.2)
BASOPHILS NFR BLD AUTO: 0.6 %
BUN SERPL-MCNC: 21 MG/DL (ref 7–30)
CALCIUM SERPL-MCNC: 8.5 MG/DL (ref 8.5–10.1)
CHLORIDE SERPL-SCNC: 108 MMOL/L (ref 94–109)
CO2 SERPL-SCNC: 26 MMOL/L (ref 20–32)
CREAT SERPL-MCNC: 0.81 MG/DL (ref 0.52–1.04)
DIFFERENTIAL METHOD BLD: ABNORMAL
EOSINOPHIL # BLD AUTO: 0.5 10E9/L (ref 0–0.7)
EOSINOPHIL NFR BLD AUTO: 4.6 %
ERYTHROCYTE [DISTWIDTH] IN BLOOD BY AUTOMATED COUNT: 18.9 % (ref 10–15)
GFR SERPL CREATININE-BSD FRML MDRD: >90 ML/MIN/{1.73_M2}
GLUCOSE SERPL-MCNC: 96 MG/DL (ref 70–99)
HCG SERPL QL: NEGATIVE
HCT VFR BLD AUTO: 39.4 % (ref 35–47)
HGB BLD-MCNC: 12.9 G/DL (ref 11.7–15.7)
HYPOCHROMIA BLD QL: PRESENT
IMM GRANULOCYTES # BLD: 0.1 10E9/L (ref 0–0.4)
IMM GRANULOCYTES NFR BLD: 0.5 %
LACTATE BLD-SCNC: 1.7 MMOL/L (ref 0.7–2)
LYMPHOCYTES # BLD AUTO: 1.2 10E9/L (ref 0.8–5.3)
LYMPHOCYTES NFR BLD AUTO: 12 %
MCH RBC QN AUTO: 18.9 PG (ref 26.5–33)
MCHC RBC AUTO-ENTMCNC: 32.7 G/DL (ref 31.5–36.5)
MCV RBC AUTO: 58 FL (ref 78–100)
MICROCYTES BLD QL SMEAR: PRESENT
MONOCYTES # BLD AUTO: 0.6 10E9/L (ref 0–1.3)
MONOCYTES NFR BLD AUTO: 6 %
NEUTROPHILS # BLD AUTO: 7.8 10E9/L (ref 1.6–8.3)
NEUTROPHILS NFR BLD AUTO: 76.3 %
NRBC # BLD AUTO: 0 10*3/UL
NRBC BLD AUTO-RTO: 0 /100
PLATELET # BLD AUTO: 238 10E9/L (ref 150–450)
PLATELET # BLD EST: ABNORMAL 10*3/UL
POTASSIUM SERPL-SCNC: 3.6 MMOL/L (ref 3.4–5.3)
RBC # BLD AUTO: 6.82 10E12/L (ref 3.8–5.2)
SODIUM SERPL-SCNC: 138 MMOL/L (ref 133–144)
TARGETS BLD QL SMEAR: SLIGHT
TROPONIN I SERPL-MCNC: 0.02 UG/L (ref 0–0.04)
WBC # BLD AUTO: 10.2 10E9/L (ref 4–11)

## 2020-03-27 PROCEDURE — A9585 GADOBUTROL INJECTION: HCPCS | Performed by: EMERGENCY MEDICINE

## 2020-03-27 PROCEDURE — 96361 HYDRATE IV INFUSION ADD-ON: CPT

## 2020-03-27 PROCEDURE — 84484 ASSAY OF TROPONIN QUANT: CPT | Performed by: EMERGENCY MEDICINE

## 2020-03-27 PROCEDURE — 84703 CHORIONIC GONADOTROPIN ASSAY: CPT | Performed by: EMERGENCY MEDICINE

## 2020-03-27 PROCEDURE — 96360 HYDRATION IV INFUSION INIT: CPT | Mod: 59

## 2020-03-27 PROCEDURE — 99222 1ST HOSP IP/OBS MODERATE 55: CPT | Mod: AI | Performed by: INTERNAL MEDICINE

## 2020-03-27 PROCEDURE — 25000132 ZZH RX MED GY IP 250 OP 250 PS 637: Performed by: EMERGENCY MEDICINE

## 2020-03-27 PROCEDURE — 70553 MRI BRAIN STEM W/O & W/DYE: CPT

## 2020-03-27 PROCEDURE — 85025 COMPLETE CBC W/AUTO DIFF WBC: CPT | Performed by: EMERGENCY MEDICINE

## 2020-03-27 PROCEDURE — 25500064 ZZH RX 255 OP 636: Performed by: EMERGENCY MEDICINE

## 2020-03-27 PROCEDURE — 70549 MR ANGIOGRAPH NECK W/O&W/DYE: CPT

## 2020-03-27 PROCEDURE — 99285 EMERGENCY DEPT VISIT HI MDM: CPT | Mod: 25

## 2020-03-27 PROCEDURE — 70544 MR ANGIOGRAPHY HEAD W/O DYE: CPT

## 2020-03-27 PROCEDURE — 93005 ELECTROCARDIOGRAM TRACING: CPT

## 2020-03-27 PROCEDURE — 25800030 ZZH RX IP 258 OP 636: Performed by: EMERGENCY MEDICINE

## 2020-03-27 PROCEDURE — 12000011 ZZH R&B MS OVERFLOW

## 2020-03-27 PROCEDURE — 80048 BASIC METABOLIC PNL TOTAL CA: CPT | Performed by: EMERGENCY MEDICINE

## 2020-03-27 PROCEDURE — 83605 ASSAY OF LACTIC ACID: CPT | Performed by: EMERGENCY MEDICINE

## 2020-03-27 RX ORDER — CLOPIDOGREL BISULFATE 75 MG/1
300 TABLET ORAL ONCE
Status: COMPLETED | OUTPATIENT
Start: 2020-03-27 | End: 2020-03-27

## 2020-03-27 RX ORDER — ASPIRIN 325 MG
325 TABLET ORAL ONCE
Status: COMPLETED | OUTPATIENT
Start: 2020-03-27 | End: 2020-03-27

## 2020-03-27 RX ORDER — LORAZEPAM 1 MG/1
1 TABLET ORAL ONCE
Status: COMPLETED | OUTPATIENT
Start: 2020-03-27 | End: 2020-03-27

## 2020-03-27 RX ORDER — GADOBUTROL 604.72 MG/ML
10 INJECTION INTRAVENOUS ONCE
Status: COMPLETED | OUTPATIENT
Start: 2020-03-27 | End: 2020-03-27

## 2020-03-27 RX ORDER — SODIUM CHLORIDE 9 MG/ML
1000 INJECTION, SOLUTION INTRAVENOUS CONTINUOUS
Status: DISCONTINUED | OUTPATIENT
Start: 2020-03-27 | End: 2020-03-28

## 2020-03-27 RX ADMIN — GADOBUTROL 10 ML: 604.72 INJECTION INTRAVENOUS at 20:41

## 2020-03-27 RX ADMIN — SODIUM CHLORIDE 1000 ML: 9 INJECTION, SOLUTION INTRAVENOUS at 20:09

## 2020-03-27 RX ADMIN — LORAZEPAM 1 MG: 1 TABLET ORAL at 20:02

## 2020-03-27 RX ADMIN — ASPIRIN 325 MG ORAL TABLET 325 MG: 325 PILL ORAL at 23:35

## 2020-03-27 RX ADMIN — CLOPIDOGREL BISULFATE 300 MG: 75 TABLET ORAL at 23:35

## 2020-03-27 ASSESSMENT — ENCOUNTER SYMPTOMS
NUMBNESS: 1
NERVOUS/ANXIOUS: 1

## 2020-03-28 VITALS
HEART RATE: 79 BPM | HEIGHT: 60 IN | TEMPERATURE: 97.1 F | OXYGEN SATURATION: 98 % | BODY MASS INDEX: 29.06 KG/M2 | SYSTOLIC BLOOD PRESSURE: 131 MMHG | DIASTOLIC BLOOD PRESSURE: 78 MMHG | RESPIRATION RATE: 16 BRPM | WEIGHT: 148 LBS

## 2020-03-28 PROBLEM — I63.81 ACUTE LACUNAR INFARCTION (H): Status: ACTIVE | Noted: 2020-03-28

## 2020-03-28 LAB
CHOLEST SERPL-MCNC: 164 MG/DL
HBA1C MFR BLD: 5.1 % (ref 0–5.6)
HDLC SERPL-MCNC: 51 MG/DL
LDLC SERPL CALC-MCNC: 97 MG/DL
NONHDLC SERPL-MCNC: 113 MG/DL
TRIGL SERPL-MCNC: 81 MG/DL

## 2020-03-28 PROCEDURE — 25000132 ZZH RX MED GY IP 250 OP 250 PS 637: Performed by: NURSE PRACTITIONER

## 2020-03-28 PROCEDURE — 99238 HOSP IP/OBS DSCHRG MGMT 30/<: CPT | Performed by: INTERNAL MEDICINE

## 2020-03-28 PROCEDURE — 36415 COLL VENOUS BLD VENIPUNCTURE: CPT | Performed by: INTERNAL MEDICINE

## 2020-03-28 PROCEDURE — 86147 CARDIOLIPIN ANTIBODY EA IG: CPT | Performed by: NURSE PRACTITIONER

## 2020-03-28 PROCEDURE — 85613 RUSSELL VIPER VENOM DILUTED: CPT | Performed by: NURSE PRACTITIONER

## 2020-03-28 PROCEDURE — 80061 LIPID PANEL: CPT | Performed by: INTERNAL MEDICINE

## 2020-03-28 PROCEDURE — 83036 HEMOGLOBIN GLYCOSYLATED A1C: CPT | Performed by: INTERNAL MEDICINE

## 2020-03-28 PROCEDURE — 25000132 ZZH RX MED GY IP 250 OP 250 PS 637: Performed by: INTERNAL MEDICINE

## 2020-03-28 PROCEDURE — 12000011 ZZH R&B MS OVERFLOW

## 2020-03-28 PROCEDURE — 00000167 ZZHCL STATISTIC INR NC: Performed by: NURSE PRACTITIONER

## 2020-03-28 PROCEDURE — 86146 BETA-2 GLYCOPROTEIN ANTIBODY: CPT | Performed by: NURSE PRACTITIONER

## 2020-03-28 PROCEDURE — 36415 COLL VENOUS BLD VENIPUNCTURE: CPT | Performed by: NURSE PRACTITIONER

## 2020-03-28 PROCEDURE — 83036 HEMOGLOBIN GLYCOSYLATED A1C: CPT | Performed by: NURSE PRACTITIONER

## 2020-03-28 PROCEDURE — 85730 THROMBOPLASTIN TIME PARTIAL: CPT | Performed by: NURSE PRACTITIONER

## 2020-03-28 PROCEDURE — 00000401 ZZHCL STATISTIC THROMBIN TIME NC: Performed by: NURSE PRACTITIONER

## 2020-03-28 PROCEDURE — G0426 INPT/ED TELECONSULT50: HCPCS | Performed by: NURSE PRACTITIONER

## 2020-03-28 RX ORDER — NIFEDIPINE 30 MG/1
60 TABLET, EXTENDED RELEASE ORAL DAILY
Status: DISCONTINUED | OUTPATIENT
Start: 2020-03-28 | End: 2020-03-28 | Stop reason: HOSPADM

## 2020-03-28 RX ORDER — ROSUVASTATIN CALCIUM 20 MG/1
20 TABLET, COATED ORAL DAILY
Qty: 30 TABLET | Refills: 11 | Status: SHIPPED | OUTPATIENT
Start: 2020-03-28

## 2020-03-28 RX ORDER — CLOPIDOGREL BISULFATE 75 MG/1
75 TABLET ORAL DAILY
Qty: 21 TABLET | Refills: 0 | Status: SHIPPED | OUTPATIENT
Start: 2020-03-29 | End: 2020-04-19

## 2020-03-28 RX ORDER — ACETAMINOPHEN 325 MG/1
325-650 TABLET ORAL EVERY 4 HOURS PRN
Qty: 50 TABLET | Refills: 0 | Status: SHIPPED | OUTPATIENT
Start: 2020-03-28

## 2020-03-28 RX ORDER — ASPIRIN 81 MG/1
81 TABLET, CHEWABLE ORAL DAILY
Qty: 30 TABLET | Refills: 11 | Status: SHIPPED | OUTPATIENT
Start: 2020-03-29

## 2020-03-28 RX ORDER — AMOXICILLIN 250 MG
1 CAPSULE ORAL 2 TIMES DAILY PRN
Status: DISCONTINUED | OUTPATIENT
Start: 2020-03-28 | End: 2020-03-28

## 2020-03-28 RX ORDER — FERROUS SULFATE 325(65) MG
325 TABLET ORAL
Status: DISCONTINUED | OUTPATIENT
Start: 2020-03-28 | End: 2020-03-28

## 2020-03-28 RX ORDER — ACETAMINOPHEN 325 MG/1
650 TABLET ORAL EVERY 4 HOURS PRN
Status: DISCONTINUED | OUTPATIENT
Start: 2020-03-28 | End: 2020-03-28 | Stop reason: HOSPADM

## 2020-03-28 RX ORDER — LIDOCAINE 40 MG/G
CREAM TOPICAL
Status: DISCONTINUED | OUTPATIENT
Start: 2020-03-28 | End: 2020-03-28 | Stop reason: HOSPADM

## 2020-03-28 RX ORDER — CLINDAMYCIN PHOSPHATE 10 UG/ML
LOTION TOPICAL 2 TIMES DAILY
COMMUNITY

## 2020-03-28 RX ORDER — ONDANSETRON 4 MG/1
4 TABLET, ORALLY DISINTEGRATING ORAL EVERY 6 HOURS PRN
Status: DISCONTINUED | OUTPATIENT
Start: 2020-03-28 | End: 2020-03-28 | Stop reason: HOSPADM

## 2020-03-28 RX ORDER — CLOPIDOGREL BISULFATE 75 MG/1
75 TABLET ORAL DAILY
Status: DISCONTINUED | OUTPATIENT
Start: 2020-03-28 | End: 2020-03-28 | Stop reason: HOSPADM

## 2020-03-28 RX ORDER — ASPIRIN 81 MG/1
81 TABLET, CHEWABLE ORAL DAILY
Status: DISCONTINUED | OUTPATIENT
Start: 2020-03-28 | End: 2020-03-28 | Stop reason: HOSPADM

## 2020-03-28 RX ORDER — FLUOCINONIDE TOPICAL SOLUTION USP, 0.05% 0.5 MG/ML
SOLUTION TOPICAL DAILY PRN
COMMUNITY

## 2020-03-28 RX ORDER — ONDANSETRON 2 MG/ML
4 INJECTION INTRAMUSCULAR; INTRAVENOUS EVERY 6 HOURS PRN
Status: DISCONTINUED | OUTPATIENT
Start: 2020-03-28 | End: 2020-03-28 | Stop reason: HOSPADM

## 2020-03-28 RX ORDER — NALOXONE HYDROCHLORIDE 0.4 MG/ML
.1-.4 INJECTION, SOLUTION INTRAMUSCULAR; INTRAVENOUS; SUBCUTANEOUS
Status: DISCONTINUED | OUTPATIENT
Start: 2020-03-28 | End: 2020-03-28 | Stop reason: HOSPADM

## 2020-03-28 RX ORDER — ASPIRIN 325 MG
325 TABLET ORAL DAILY
Status: DISCONTINUED | OUTPATIENT
Start: 2020-03-28 | End: 2020-03-28

## 2020-03-28 RX ADMIN — ASPIRIN 81 MG 81 MG: 81 TABLET ORAL at 09:22

## 2020-03-28 RX ADMIN — CLOPIDOGREL BISULFATE 75 MG: 75 TABLET ORAL at 09:22

## 2020-03-28 ASSESSMENT — MIFFLIN-ST. JEOR: SCORE: 1267.82

## 2020-03-28 NOTE — ED NOTES
[facility]  ED Nurse Handoff Report    Mey Kim is a 40 year old female   ED Chief complaint: Numbness  . ED Diagnosis:   Final diagnoses:   Cerebrovascular accident (CVA), unspecified mechanism (H)     Allergies:   Allergies   Allergen Reactions     Lisinopril      Cough causing gagging leading to chapito wellington tear       Code Status: not on file  Activity level - Baseline/Home:  Independent. Activity Level - Current:   Independent. Lift room needed: No. Bariatric: No   Needed: No   Isolation: Yes. Infection: COVID rule out  Vital Signs:   Vitals:    03/27/20 1931 03/27/20 2000 03/27/20 2015 03/27/20 2030   BP: (!) 153/102 132/84  127/79   Pulse: 133   90   Resp: 20      Temp: 99.1  F (37.3  C)      TempSrc: Oral      SpO2: 100%  99% 99%   Weight: 65.8 kg (145 lb)          Cardiac Rhythm:  ,      Pain level:    Patient confused: No. Patient Falls Risk: No.   Elimination Status: Has voided   Patient Report - Initial Complaint: Numbness. Focused Assessment:  Mey Kim is a 40 year old female, with a history of CVA and anxiety, who presents with numbness. Per chart review, the patient had a Lacunar infarct on 06/02/18. The patient notes that she has had left sided numbness and tingling from her left leg to her head and tongue for the past 3-4 days. She reports that she is anxious because she has had a stroke in the past. She states that she has a chronic cough. She reports that she has a runny nose due to crying.   Tests Performed:   Labs Ordered and Resulted from Time of ED Arrival Up to the Time of Departure from the ED   CBC WITH PLATELETS DIFFERENTIAL - Abnormal; Notable for the following components:       Result Value    RBC Count 6.82 (*)     MCV 58 (*)     MCH 18.9 (*)     RDW 18.9 (*)     All other components within normal limits   LACTIC ACID WHOLE BLOOD   BASIC METABOLIC PANEL   TROPONIN I   HCG QUALITATIVE   CARDIAC CONTINUOUS MONITORING     MR Brain w/o & w  Contrast   Final Result   IMPRESSION:   1.  Acute lacunar infarct near lateral margin of the right thalamus and the posterior limb of the right internal capsule. No hemorrhage or mass effect.   2.  Chronic right basal ganglia infarcts.   3.  Paranasal sinus mucosal thickening with an air-fluid level in the left maxillary sinus. Correlate clinically for symptoms/signs of acute sinusitis.            MR Neck w/o & w Contrast Angiogram   Final Result   IMPRESSION:   1.  Normal neck MRA.      MR Head w/o Contrast Angiogram   Final Result   IMPRESSION:   No flow-limiting stenosis or large vessel occlusion of the major intracranial arterial vasculature.          Treatments provided: See MAR  Family Comments: none present  OBS brochure/video discussed/provided to patient:  N/A  ED Medications:   Medications   0.9% sodium chloride BOLUS (1,000 mLs Intravenous New Bag 3/27/20 2009)     Followed by   sodium chloride 0.9% infusion (has no administration in time range)   clopidogrel (PLAVIX) tablet 300 mg (has no administration in time range)   aspirin (ASA) tablet 325 mg (has no administration in time range)   LORazepam (ATIVAN) tablet 1 mg (1 mg Oral Given 3/27/20 2002)   gadobutrol (GADAVIST) injection 10 mL (10 mLs Intravenous Given 3/27/20 2041)   sodium chloride (PF) 0.9% PF flush 60 mL (100 mLs Intravenous Given 3/27/20 2041)     Drips infusing:  No  For the majority of the shift, the patient's behavior Green. Interventions performed were rounding.    Sepsis treatment initiated: No       ED Nurse Name/Phone Number: Stephany Strong RN,   11:32 PM    RECEIVING UNIT ED HANDOFF REVIEW    Above ED Nurse Handoff Report was reviewed: Yes  Reviewed by: Alissa Craig RN on March 28, 2020 at 1:51 AM

## 2020-03-28 NOTE — PLAN OF CARE
Vitals: /78  Pulse 79   Temp 97.1  F (Oral)   Resp 16  SpO2 98%  Breastfeeding: No      Neuro: WNL ex sensation changes on L side of body. Reports sensation on L face, arm and leg 'tingly' compared to R. Unchanged during shift. Patient reports slight improvement of symptoms since arrival to ED.  Cardiac: WNL - Tele SR  Lungs: WNL  GI: WNL  : WNL  Pain: Denies  IV: SL  Diet: Regular - tolerating  Activity: Ind - steady gait observed  Plan: Tele stroke completed. Will discharge this afternoon with medication management. Stroke education completed with stroke book.

## 2020-03-28 NOTE — ED PROVIDER NOTES
History     Chief Complaint:  Numbness      HPI   Mey Kim is a 40 year old female, with a history of CVA and anxiety, who presents with numbness. Per chart review, the patient had a Lacunar infarct on 18. The patient notes that she has had left sided numbness and tingling from her left leg to her head and tongue for the past 3-4 days. She reports that she is anxious because she has had a stroke in the past. She states that she has a chronic cough. She reports that she has a runny nose due to crying.       Allergies:  Lisinopril     Medications:    Aspirin   Acetaminophen  Senokot  Crestor  Adalatcc  Nexaplanon    Past Medical History:    CVA  GIB  HTN  Hemorrhage from placenta previa  Anxiety  Depression     Past Surgical History:    Dilation and curettage x2   section     Family History:    Cancer, father  Anemia, mother  Heart disease, cousin  DM  HTN  Heart murmur    Social History:  Smoking status: Former smoker  Alcohol use: Yes  Drug use: Yes, marijuana  PCP: Luzmaria Aponte  Marital Status:  Single [1]    Review of Systems   Neurological: Positive for numbness (and tingling on her left side with tongue numbness).   Psychiatric/Behavioral: The patient is nervous/anxious.    All other systems reviewed and are negative.    Physical Exam     Patient Vitals for the past 24 hrs:   BP Temp Temp src Pulse Heart Rate Resp SpO2 Weight   20 127/79 -- -- 90 -- -- 99 % --   20 -- -- -- -- -- -- 99 % --   20 132/84 -- -- -- -- -- -- --   20 193 (!) 153/102 99.1  F (37.3  C) Oral 133 133 20 100 % 65.8 kg (145 lb)        Physical Exam  VS: Reviewed per above  HENT: Mucous membranes moist, no nuchal rigidity  EYES: sclera anicteric  CV: Rate as noted, regular rhythm.   RESP: Effort normal. Breath sounds are normal bilaterally.  GI: no tenderness/rebound/guarding, not distended.  NEURO: GCS 15, cranial nerves II through XII are intact aside from subjective left facial  numbness, 5 out of 5 strength in all 4 extremities, left upper extremity and left lower extremity subjective numbness.  MSK: No deformity of the extremities  SKIN: Warm and dry      Emergency Department Course     ECG:  ECG taken at 2005, ECG read at 2008  Sinus tachycardia  Nonspecific ST and T wave abnormality   Abnormal ECG  Changes noted above, new tachycardia, compared to EKG dated 12/16/19  Rate 107 bpm. AZ interval 168 ms. QRS duration 86 ms. QT/QTc 354/472 ms. P-R-T axes  49 53-22.    Imaging:  Radiology findings were communicated with the patient who voiced understanding of the findings.      MR Brain w/o & w contrast:  1.  Acute lacunar infarct near lateral margin of the right thalamus and the posterior limb of the right internal capsule. No hemorrhage or mass effect.   2.  Chronic right basal ganglia infarcts.   3.  Paranasal sinus mucosal thickening with an air-fluid level in the left maxillary sinus. Correlate clinically for symptoms/signs of acute sinusitis.     Reading per radiology     MR Head w/o contrast Angiogram:  No flow-limiting stenosis or large vessel occlusion of the major intracranial arterial vasculature.     Reading per radiology      MR Neck w/o & w contrast Angiogramt:  1. Normal neck MRA.     Reading per radiology     Laboratory:  Laboratory findings were communicated with the patient who voiced understanding of the findings.    CBC: WBC 10.2, HGB 12.9,   BMP: o/w WNL (Creatinine 0.81)  Troponin (Collected 2043): 0.016   HCG qualitative preg: negative   Lactic acid whole blood: 1.7    Procedures    Interventions:  2002 Lorazepam 1 mg, PO   2009 NS 1L IV Bolus   2041 Gadavist, 10 mL, IV  2041 Sodium chloride, 100 mL, IV    Emergency Department Course:   Nursing notes and vitals reviewed.    1935 I performed an exam of the patient as documented above.     2022 IV was inserted and blood was drawn for laboratory testing, results above.     2029 The patient was sent for a MRI Head,  Neck, Brain while in the emergency department, results above.      2222 I personally reviewed the results with the patient and answered all related questions prior to admission.    2224 I spoke with Dr. Pena of the Stroke Neurology service regarding patient's presentation, findings, and plan of care.       I spoke to Dr. Mclean of the hospitalist service who accepts the patient for admission.        Impression & Plan      Medical Decision Making:  Mey Kim is a 40 year old female who presents to the emergency department today for evaluation of 2 to 3 days of left-sided paresthesias.  On arrival she is tachycardic to 133 which she attributes to her anxiety.  After Ativan and IV fluids, heart rate normalized. ECG sinus rhythm. Given history of stroke and new neuro findings, MRI and MRA of the head and neck was obtained.  This study revealed evidence of acute CVA.  Discussed with stroke neurology with plans for aspirin and Plavix and admission to the hospital.  Upon signout to my colleague, she was awaiting inpatient bed.    Diagnosis:    ICD-10-CM    1. Cerebrovascular accident (CVA), unspecified mechanism (H)  I63.9        Disposition:   The patient is admitted into the care of Dr. Mclean.        Scribe Disclosure:  I, Manisha Mendez, am serving as a scribe at 1935 on 3/27/2020 to document services personally performed by Shyam Garcia MD based on my observations and the provider's statements to me.  [facility] EMERGENCY DEPARTMENT       Shyam Garcia MD  03/28/20 0037

## 2020-03-28 NOTE — PHARMACY-ADMISSION MEDICATION HISTORY
Admission medication history interview status for this patient is complete. See [facility] admission navigator for allergy information, prior to admission medications and immunization status.     Medication history interview source(s):Patient  Medication history resources (including written lists, pill bottles, clinic record): care everywhere  Primary pharmacy: [facility]    Changes made to PTA medication list:  Added: clindamycin, fluocinonide  Deleted: senokot, crestor, prenatal vitamin, oxycodone, ferrous sulfate, aspirin, tylenol  Changed: nothing    Actions taken by pharmacist (provider contacted, etc):None     Additional medication history information: Pt reports taking acetaminophen for headaches occasionally    Medication reconciliation/reorder completed by provider prior to medication history?  Y   (Y/N)     For patients on insulin therapy: N  (Y/N)    Prior to Admission medications    Medication Sig Last Dose Taking? Auth Provider   clindamycin (CLEOCIN T) 1 % external lotion Apply topically 2 times daily 3/23/2020 Yes Unknown, Entered By History   fluocinonide (LIDEX) 0.05 % external solution Apply topically daily as needed Past Week at Unknown time Yes Unknown, Entered By History   NIFEdipine ER OSMOTIC (PROCARDIA XL) 30 MG 24 hr tablet Take 2 tablets (60 mg) by mouth daily 3/27/2020 at am Yes Hanna Vitale MD

## 2020-03-28 NOTE — PLAN OF CARE
Patient's After Visit Summary was reviewed with patient.  Patient verbalized understanding of After Visit Summary, recommended follow up and was given an opportunity to ask questions.   Discharge medications sent home with patient/family: YES - plavix, aspirin, crestor, and tylenol.   Discharged with spouse.    Discharged in stable condition with spouse as ride. Medications and importance of compliance thoroughly reviewed. Patient verbalized understanding.

## 2020-03-28 NOTE — H&P
Admitted:     2020      PRIMARY CARE PHYSICIAN:  JOLYNN Kirk.      CHIEF COMPLAINT:  Left-sided numbness.      HISTORY OF PRESENT ILLNESS:  Ms. Mey Kim is a 40-year-old female with past medical history significant for lacunar infarct in 2018, history of hypertension, depression and anxiety who presented to the emergency room at [facility] with complaint of left-sided numbness.  The patient's symptoms started on last Tuesday, about 2-3 days ago, when she had sudden onset of left-sided numbness, which was on and off.  This presentation is similar to symptoms she had in 2018 when she had diagnosis of lacunar infarct.  She had headaches yesterday.  The patient admitted that she is not taking her aspirin regularly lately.  The patient denies any weakness, vision change, diplopia, blurry vision, speech difficulties.  She also denies loss of consciousness and shortness of breath or chest pains, palpitations, dizziness, lightheadedness.  She denies abdominal pain, hematemesis, melena or rectal bleed, dysuria, urinary frequency, urgency or leg swelling.  The patient had a  section done on 2018.  She had the baby and she is lactating.  Prior to the pregnancy, the patient was advised to continue aspirin and Crestor was discontinued.  On arrival in the emergency room, she had an MRI of the brain, which showed acute lacunar infarct in the lateral margin of the right thalamus and posterior limb of right internal capsule.  No hemorrhage or mass effect.  MRA of the head was negative, the same as MRI of the neck without contrast was also negative.  ER doctor spoke with the neurologist on-call.  The patient was given Plavix 300 mg loading dose and aspirin 325 mg.  The patient was also given IV fluids.  The patient also received IV Ativan.  Other review of systems is unremarkable.  She will be admitted to telemetry bed for further evaluation.      PAST MEDICAL HISTORY:   1.  History of  right lacunar infarct on 2018.  She was admitted at [facility].  She was discharged on aspirin.  Crestor was held during pregnancy and lactation.   2.  Hypertension.   3.  Depression and anxiety disorder.   4.  History of hemorrhage from placenta previa.   5.  History of gastrointestinal bleed to Diamond-Prudencio tear.   6.   section on 2019   7.  D and C x 2.   8.  History of microcytic anemia and Hemoglobin E hemoglobinopathy     ALLERGIES:  LISINOPRIL.      CURRENT MEDICATIONS:     1.  Aspirin 81 mg daily.   2.  Tylenol p.r.n.   3.  Nifedipine ER 60 mg once daily.   4.  Senokot p.r.n.      FAMILY HISTORY:  The patient's father suffered cancer.  Mother suffers anemia.  She also suffers heart disease.  There is family history of diabetes and hypertension.      SOCIAL HISTORY:  The patient is a former smoker.  She drinks alcohol socially.      REVIEW OF SYSTEMS:  All 10-point review of systems were obtained.  For those pertinent positives, please refer to history of present illness, otherwise review of systems negative.      PHYSICAL EXAMINATION:   GENERAL:  The patient is a 40-year-old female who is alert, oriented with no acute respiratory distress.   VITAL SIGNS:  Blood pressure 127/79, pulse 90, respirations 20, temperature 99.1 Fahrenheit, pulse ox was 99% on room air.   SKIN:  Dry and warm.  There is no rash or petechiae.   HEENT:  Head without trauma.  Pupils are equal, round and reactive.  Mouth and throat normal.  There is no oral thrush.   NECK:  Supple.  There is no lymphadenopathy, no thyromegaly or nodules.   LUNGS:  Clear to auscultation.   HEART:  Regular rate and rhythm.  There are no murmurs, rubs or gallops auscultated.   ABDOMEN:  Soft.  Bowel sounds positive.  There is no tenderness.   EXTREMITIES:  No edema.  Full range of motion.   NEUROLOGIC:  Alert and oriented x 3.  Motor examination is 5/5 in all extremities.  Sensory is normal.  Gait not tested.      IMAGING:   MRI of the brain shows acute lacunar infarct near the lateral margin of the right thalamus and posterior limb of right internal capsule.  No hemorrhage or mass effect.  There are chronic right basal ganglia infarcts.      MRA of the brain and neck were negative.      LABORATORY TESTS:  Electrolytes within normal limits, creatinine 0.81, calcium 8.5.  Pregnancy test is negative.  Lactate is 1.7.  Troponin is less than 0.016.  Glucose 96.  Hemoglobin is 12.9, MCV 58, WBC is 10.2, platelet count 238.  EKG shows sinus tachycardia at the rate of 110 beats per minute.  There are nonspecific ST-T changes.      ASSESSMENT AND PLAN:  A 40-year-old with known history of prior basal ganglia infarcts, depression, anxiety who presents with left-sided numbness.  MRI results show acute lacunar infarct in the right thalamus and posterior limb of right internal capsule.     1.  Acute lacunar infarct of the right thalamus and posterior limb of right internal capsule.  The patient will be admitted to a telemetry bed.  We will consult Neurology team.  The patient received Plavix loading dose 300 mg x1.  We will continue with Plavix 75 mg once daily.  We will continue aspirin 325 mg once daily.  Further recommendations as per Neurology in the morning.  The patient had a recent echocardiogram in 12/2019, there is no need to repeat.  We will follow up closely.  The patient's speech is adequate.  She has no focal weakness at this point.  The patient used to take Crestor, which was discontinued during her pregnancy and also during her lactation.  We will ask tomorrow again whether she will be continuing with lactation and if so, we will hold Crestor, but otherwise, if she stops her lactation, then we will start her on Crestor treatment.   2.  History of hypertension.  The patient is on nifedipine, blood pressure is stable.  We will continue nifedipine for now.   3.  History of depression and anxiety.  On arrival, she was very anxious, so  she was given Ativan.  We will monitor closely.   4.  Deep venous thrombosis prophylaxis.  We will use pneumo boots.      CODE STATUS:  Full code.         JASSON LAM MD             D: 2020   T: 2020   MT: LATONYA      Name:     OMAYRA ECHEVARRIA   MRN:      [id number]        Account:      [id number]   :      1980        Admitted:     2020                   Document: [id number]       cc: Jasson NEIL

## 2020-03-28 NOTE — CONSULTS
"  [facility]    Stroke Consult Note    Reason for Consult:  \"acute lacunar infarct\"    Chief Complaint: Numbness       HPI  Mey Kim is a 40 year old female with past medical history significant for HTN, anxiety, and prior lacunar infarct (2018) who presented to the [facility] ED for evaluation of left side numbness and tingling. She reports that on Tuesday or Wednesday she awoke with left hemibody numbness that became progressively worse over the next several days. By Friday, she felt as though it was affecting her ability to walk and presented for evaluation. She denies associated focal weakness, speech difficulty, or vision changes. MRI brain revealed an acute infarct of the right thalamus and posterior limb of the right internal capsule.     Today she continues to have left side tingling more so than numbness. She tells me that she has been taking aspirin since her prior stroke, but is inconsistent with her doses, taking it approximately 6 days in a two week period. She denies a personal or family history of clotting disorder but does note she has had 2 prior miscarriages.     Stroke Evaluation summarized:  MRI/Head CT: MRI brain as above.   Intracranial Vascular Imaging: MRA brain unremarkable.   Cervical Carotid and Vertebral Artery Vascular Imaging: MRA neck unremarkable.   Echocardiogram: From 12/2019 - EF 60-65%, LA borderline dilated  EKG/Telemetry: Sinus tachycardia  LDL: 97  A1c: 5.1  Troponin: 0.016  Other testing: Not Applicable    Impression  Ischemic Stroke due to small-vessel occlusion      Recommendations  - DAPT with ASA 81 mg + Plavix 75 mg for 21 days, then ASA 81 mg alone indefinitely. Advised patient that this needs to be taken every day.  - LDL 97. Patient previously on Crestor, which was held during pregnancy/lactation. Recommend resuming statin when appropriate to achieve goal LDL 40-70.   - A1c 5.1, within goal <7.0  - Goal BP <140/90 with tighter control associated with " "decreased overall CV risk, if tolerated. Discussed the importance of home BP monitoring and keeping a log for PCP.  - Discussed stroke warning signs (BEFAST) and need for emergent presentation if symptoms present.  - Lupus anticoagulant panel ordered, can be followed-up outpatient. Further hypercoagulable workup can be done outpatient.  - Patient reports she quit smoking approximately 1 year ago. Discussed that smoking is a stroke risk factor and she should not resume.     Patient Follow-up    - in the next 1-2 week(s) with PCP  - in 4-6 weeks with local neurologist    Thank you for this consult. No further stroke evaluation is recommended, so we will sign off. Please contact us with any additional questions.    EDIN Mcclure, CNP  Neurology  2020 11:19 AM  To page stroke neurology after hours or on a subsequent day, click here: AMCOM  Choose \"On Call\" tab at top, then search dropdown box for \"Neurology Adult\" & press Enter, look for Neuro ICU/Stroke  _____________________________________________________    Past Medical History   Past Medical History:   Diagnosis Date     CVA (cerebral vascular accident) (H) 2018    right corona radiata-hypecoagulable work up was negative     GIB (gastrointestinal bleeding) 2019     High cholesterol      Hypertension      Past Surgical History   Past Surgical History:   Procedure Laterality Date      SECTION N/A 2019    Procedure: PRIMARY  SECTION;  Surgeon: Karon Eugene MD;  Location: [facility] L+D     DILATION AND CURETTAGE       DILATION AND CURETTAGE SUCTION WITH ULTRASOUND GUIDANCE N/A 3/31/2017    Procedure: DILATION AND CURETTAGE SUCTION WITH ULTRASOUND GUIDANCE;  Surgeon: Cary Ascencio MD;  Location: [facility] OR     Medications   Home Meds  Prior to Admission medications    Medication Sig Start Date End Date Taking? Authorizing Provider   acetaminophen (TYLENOL) 325 MG tablet Take 1-2 tablets (325-650 mg) by mouth every 6 hours as " needed for mild pain 12/20/19   Hanna Vitale MD   aspirin (ASA) 81 MG EC tablet Take 1 tablet (81 mg) by mouth daily Ok to resume this medication tomorrow 12/20/19   Hanna Vitale MD   ferrous sulfate (FEROSUL) 325 (65 Fe) MG tablet Take 325 mg by mouth daily (with breakfast)    Reported, Patient   NIFEdipine ER OSMOTIC (PROCARDIA XL) 30 MG 24 hr tablet Take 2 tablets (60 mg) by mouth daily 12/20/19   Hanna Vitale MD   oxyCODONE (ROXICODONE) 5 MG tablet Take 1-2 tablets (5-10 mg) by mouth every 6 hours as needed for pain 12/20/19 12/25/19  Hanna Vitale MD   Prenatal MV-Min-Fe Fum-FA-DHA (PRENATAL 1 PO)     Reported, Patient   rosuvastatin (CRESTOR) 20 MG tablet Take 1 tablet (20 mg) by mouth daily 6/4/18   Mila Jones MD   senna-docusate (SENOKOT-S/PERICOLACE) 8.6-50 MG tablet Take 1 tablet by mouth 2 times daily as needed for constipation 12/20/19   Hanna Vitale MD       Scheduled Meds    aspirin  81 mg Oral Daily     clopidogrel  75 mg Oral Daily     ferrous sulfate  325 mg Oral Daily with breakfast     NIFEdipine ER OSMOTIC  60 mg Oral Daily     sodium chloride (PF)  3 mL Intracatheter Q8H       Infusion Meds      PRN Meds  acetaminophen, lidocaine 4%, lidocaine (buffered or not buffered), naloxone, ondansetron **OR** ondansetron, senna-docusate, sodium chloride (PF)    Allergies   Allergies   Allergen Reactions     Lisinopril      Cough causing gagging leading to chapito wellington tear     Family History   No family history on file.  Social History   Social History     Tobacco Use     Smoking status: Former Smoker     Smokeless tobacco: Former User     Quit date: 5/1/2019   Substance Use Topics     Alcohol use: Yes     Comment: not with pregnancy     Drug use: Yes     Types: Marijuana     Comment: Early pregnancy        Review of Systems   The 10 point Review of Systems is negative other than noted in the HPI or here.        PHYSICAL EXAMINATION   Temp:   [97.1  F (36.2  C)-99.1  F (37.3  C)] 97.7  F (36.5  C)  Pulse:  [] 74  Heart Rate:  [] 85  Resp:  [16-20] 16  BP: (109-153)/() 109/51  SpO2:  [99 %-100 %] 100 %    Neurologic  Mental Status:  alert, oriented x 3, follows commands, speech clear and fluent, naming and repetition normal  Cranial Nerves:  visual fields intact (tested by nurse), EOMI with normal smooth pursuit, facial sensation intact and symmetric (tested by nurse), facial movements symmetric, hearing not formally tested but intact to conversation, no dysarthria, shoulder shrug equal bilaterally, tongue protrusion midline  Motor:  no abnormal movements, able to move all limbs antigravity spontaneously with no signs of hemiparesis observed, no pronator drift  Reflexes:  unable to test (telestroke)  Sensory:  no extinction on double simultaneous stimulation (assessed by nurse), decreased sensation to left hemibody  Coordination:  normal finger-to-nose and heel-to-shin bilaterally without dysmetria  Station/Gait:  unable to test due to telestroke    Dysphagia Screen  Per Nursing    Stroke Scales    NIHSS  Interval     Interval Comments     1a. Level of Consciousness 0-->Alert, keenly responsive   1b. LOC Questions 0-->Answers both questions correctly   1c. LOC Commands 0-->Performs both tasks correctly   2.   Best Gaze 0-->Normal   3.   Visual 0-->No visual loss   4.   Facial Palsy 0-->Normal symmetrical movements   5a. Motor Arm, Left 0-->No drift, limb holds 90 (or 45) degrees for full 10 secs   5b. Motor Arm, Right 0-->No drift, limb holds 90 (or 45) degrees for full 10 secs   6a. Motor Leg, Left 0-->No drift, leg holds 30 degree position for full 5 secs   6b. Motor Leg, right 0-->No drift, leg holds 30 degree position for full 5 secs   7.   Limb Ataxia 0-->Absent   8.   Sensory 1-->Mild-to-moderate sensory loss, patient feels pinprick is less sharp or is dull on the affected side, or there is a loss of superficial pain with pinprick,  but patient is aware of being touched   9.   Best Language 0-->No aphasia, normal   10. Dysarthria 0-->Normal   11. Extinction and Inattention  0-->No abnormality   Total 1 (03/28/20 0956)        PHQ-2 Depression Screening  Over the last 2 weeks, how many days have you noted: Never   (0 points) Several  (1 point) More than half  (2 points) Nearly all  (3 points)   Little interest/pleasure in doing things?   0      Feeling down, depressed, or hopeless? 0        PHQ-2 depression score: 0, consistent with a negative screen for depression.      Imaging  I personally reviewed all imaging; relevant findings per HPI.    Labs Data   CBC  Recent Labs   Lab 03/27/20 2011   WBC 10.2   RBC 6.82*   HGB 12.9   HCT 39.4        Basic Metabolic Panel   Recent Labs   Lab 03/27/20 2011      POTASSIUM 3.6   CHLORIDE 108   CO2 26   BUN 21   CR 0.81   GLC 96   REMY 8.5     Liver Panel  Recent Labs   Lab Test 11/19/19  1624 01/13/19  0712 06/03/18  0410   PROTTOTAL  --  6.3* 6.3*   ALBUMIN  --  3.3* 3.1*   BILITOTAL  --  0.6 0.3   ALKPHOS  --  36* 44   AST 27 16 12   ALT 20 25 16     INR  Recent Labs   Lab Test 01/13/19  0712 06/02/18  1950   INR 1.21* 1.08      Lipid Profile  Recent Labs   Lab Test 03/28/20  0551 06/03/18  0410 06/02/18 1958   CHOL 164 144 162   HDL 51 58 58   LDL 97 70 84   TRIG 81 81 101     A1C  Recent Labs   Lab Test 06/02/18 1958   A1C 4.3     Troponin I  Recent Labs   Lab 03/27/20 2011   TROPI 0.016          Stroke Code / Stroke Consult Data Data Telestroke Service Details  (for non-emergent stroke consult with tele)  Video start time 03/28/20   0930   Video end time 03/28/20   0955   Type of service telemedicine diagnostic assessment of acute neurological changes   Reason telemedicine is appropriate patient requires assessment with a specialist for diagnosis and treatment of neurological symptoms   Mode of transmission secure interactive audio and video communication per [facility]   [facility] site  (patient location) [facility]    Distant site (provider location) [facility]       I have personally spent a total of 50 minutes providing care and consulting with this patient's medical providers today, with more than 50% of this time spent in consultation, coordination of care, and discussion with the patient and/or family regarding diagnostic results, prognosis, symptom management, risks and benefits of management options, and development of plan of care.

## 2020-03-28 NOTE — PROGRESS NOTES
ROOM # 226    Living Situation (if not independent, order SW consult): home with kids  Facility name: n/a  : [place], significant other (see demographics)    Activity level at baseline: ind  Activity level on admit: ind      Patient registered to inpatient; given Patient Bill of Rights; given the opportunity to ask questions about their plan of care.  Patient has been oriented to the room, bathroom and call light is in place.    Discussed discharge goals and expectations with patient/family.

## 2020-03-28 NOTE — PLAN OF CARE
VS: Temp: 97.1  F (36.2  C) Temp src: Oral BP: 132/74 Pulse: 90 Heart Rate: 85 Resp: 16 SpO2: 100 % O2 Device: None (Room air)   Height: 152.4 cm (5') Weight: 67.1 kg (148 lb)    Pt A&Ox4, VSS on RA. Pt denies pain. Reports persistent mild numbness/tingling on left side, LLE and LUE. Reports tingling only on L side of face. C/o L foot heaviness. Denies any increased weakness in extremities. Has chronic dry cough. IV SL. Up independently in room; denies dizziness, LH, or unsteadiness when ambulating. SR HR 70s per tele tech. Plan: monitor on telemetry, AM labs (lipids), neurology consult.

## 2020-03-28 NOTE — DISCHARGE SUMMARY
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 PMH significant for hypertension, anxiety, depression, history of microcytic anemia, Hemoglobin E hemoglobinopathy, prior lacunar infarct (2018, no longer taking recommended aspirin 81 mg daily regularly), history of GI bleed due to [place] tear, mild mitral regurgitation, 2 previous miscarriages, and status post a 2019  for vaginal bleeding due to complete placenta previa. Patient confirms she is no longer breastfeeding. Patient presented to ER with complaint of 3-4 days left-sided numbness and tingling to her left head, tongue, upper and lower extremities. MRI in the ER revealed an acute lacunar infarct near the lateral margin of the right thalamus and posterior limb of the right internal capsule. ER provider spoke with the neurologist on-call. The patient was given Plavix 300 mg loading dose and aspirin 325 mg. Patient admitted on telemetry for further evaluation and telestroke neurology consultation.     Hospital Course   Mey Kim was admitted on 3/27/2020.  The following problems were addressed during her hospitalization:    Ischemic stroke due to small vessel occlusion  Prior lacunar infarct, 2018  Patient never resumed regular aspirin 81 mg daily or crestor 20 mg after pregnancy. She is no longer breastfeeding.   Appreciate tele stroke neurology consultation and  recommendations.   MRI in the ER revealed an acute lacunar infarct near the lateral margin of the right thalamus and posterior limb of the right internal capsule.  MRA brain and neck unremarkable.   Echocardiogram from 2019 with EF 60-65%, LA borderline dilated.   Sinus rhythm on telemetry; occasionally tachycardic.   LDL 97. Resuming Crestor 20 mg HS as she is no longer breastfeeding.   Hgb A1c 5.1%, which is within goal.   Troponin 0.016.   Neurology recommends DAPT with ASA 81 mg + Plavix 75 mg for 21 days, then ASA 81 mg alone indefinitely.   Stressed importance of compliance to patient.   BP goal is < 140/90, which she is currently within. Tighter control associated with further decreases in CV risk. Recommend that patient monitor BP daily and keep log for PCP.   Patient educated on stroke warning signs.   Neurology ordered lupus anticoagulant panel and the results will need to be followed by PCP and neurology.   Congratulated on tobacco cessation.   Follow up with primary care in the next week, noting that this may be phone visit due to current COVID-19.  Follow up with [facility] in 4-6 weeks. Referral sent.     History of microcytic anemia  Hemoglobin E hemoglobinopathy  History of GI bleed due to [place] tear  2 previous miscarriages  2019  for vaginal bleeding due to complete placenta previa  Hypercoagulable workup as above per neurology.   Patient to follow closely with primary care and neurology.   No NSAIDs for pain while on aspirin and plavix. Recommended that patient use Tylenol PRN pain.     Hypertension  BP goal is < 140/90, which she is currently within. Tighter control associated with further decreases in CV risk. Recommend that patient monitor BP daily and keep log for PCP.   Continue PTA nifedipine.     Janette Lenz MD FACP  Hospitalist Service  [facility]      Significant Results and Procedures   Imaging studies as  below    Pending Results   These results will be followed up by primary care and neurology for hypercoagulable workup.   Unresulted Labs Ordered in the Past 30 Days of this Admission     Date and Time Order Name Status Description    3/28/2020 1149 Beta 2 Glycoprotein Antibodies IGG IGM In process     3/28/2020 1149 Cardiolipin Namita IgG and IgM In process     3/28/2020 1149 Lupus Anticoagulant Panel In process           Code Status   Full Code       Primary Care Physician   Luzmaria Aponte    Physical Exam   Temp: 97.1  F (36.2  C) Temp src: Oral BP: 131/78 Pulse: 79 Heart Rate: 85 Resp: 16 SpO2: 98 % O2 Device: None (Room air)    Vitals:    03/27/20 1931 03/28/20 0227   Weight: 65.8 kg (145 lb) 67.1 kg (148 lb)     Vital Signs with Ranges  Temp:  [97.1  F (36.2  C)-99.1  F (37.3  C)] 97.1  F (36.2  C)  Pulse:  [] 79  Heart Rate:  [] 85  Resp:  [16-20] 16  BP: (109-153)/() 131/78  SpO2:  [98 %-100 %] 98 %  No intake/output data recorded.    Constitutional: Well-appearing young woman who appears appropriate for her age. Alert and oriented x3. No acute distress. Non-toxic.   HEENT: NCAT. EOMI. Moist oral mucosa.  Respiratory: Clear to auscultation bilaterally. No crackles or wheezes.  Cardiovascular: Regular rate and rhythm. Soft systolic murmur appreciated.   GI: Soft, nontender, nondistended. Normoactive bowel sounds.   Musculoskeletal: No gross deformities.   Neurologic: Alert and oriented x3. Patient reports tingling sensation to left extremities. No deficits in strength. Otherwise no focal neurologic deficits.     Discharge Disposition   Discharged to home  Condition at discharge: Stable    Consultations This Hospital Stay   NEUROLOGY IP CONSULT  PATIENT LEARNING CENTER IP CONSULT    Time Spent on this Encounter   IJanette MD, personally saw the patient today and spent less than or equal to 30 minutes discharging this patient.    Discharge Orders      Reason for your hospital stay     Ischemic Stroke due to small-vessel occlusion     Follow-up and recommended labs and tests     Follow up with primary care provider, Luzmaria Aponte, within 7 days for hospital follow-up. This may be a phone visit in setting of current COVID-19 pandemic. Follow blood pressure log and ensure within goal < 140/90.   Follow up with [facility] in 4-6 weeks for stroke follow up. Neurology will follow anticoagulation labs and order additional testing as needed.     Activity    Your activity upon discharge: activity as tolerated     Monitor and record    Monitor and record blood pressure daily and keep log to review with primary care and neurology. Goal blood pressure is less than 140/90.     Discharge Instructions    Do not take any NSAIDs (Aleve / naproxen, Advil / ibuprofen, etc.) while you are taking aspirin and Plavix in the next 21 days as you will be at increased risk of bleeding. Instead, take Tylenol as needed for pain.     Full Code     Diet    Follow this diet upon discharge: Low fat diet     Discharge Medications   Current Discharge Medication List      START taking these medications    Details   acetaminophen (TYLENOL) 325 MG tablet Take 1-2 tablets (325-650 mg) by mouth every 4 hours as needed for pain  Qty: 50 tablet, Refills: 0    Associated Diagnoses: Acute lacunar infarction (H)      aspirin (ASA) 81 MG chewable tablet Take 1 tablet (81 mg) by mouth daily . Take this indefinitely.  Qty: 30 tablet, Refills: 11    Associated Diagnoses: Acute lacunar infarction (H)      clopidogrel (PLAVIX) 75 MG tablet Take 1 tablet (75 mg) by mouth daily for 21 days and then stop.  Qty: 21 tablet, Refills: 0    Associated Diagnoses: Acute lacunar infarction (H)      rosuvastatin (CRESTOR) 20 MG tablet Take 1 tablet (20 mg) by mouth daily  Qty: 30 tablet, Refills: 11    Associated Diagnoses: Acute lacunar infarction (H)         CONTINUE these medications which have NOT CHANGED    Details   clindamycin  (CLEOCIN T) 1 % external lotion Apply topically 2 times daily      fluocinonide (LIDEX) 0.05 % external solution Apply topically daily as needed      NIFEdipine ER OSMOTIC (PROCARDIA XL) 30 MG 24 hr tablet Take 2 tablets (60 mg) by mouth daily           Allergies   Allergies   Allergen Reactions     Lisinopril      Cough causing gagging leading to chapito [place] tear     Data   Most Recent 3 CBC's:  Recent Labs   Lab Test 03/27/20 2011 12/17/19  1132 12/16/19  1117   WBC 10.2 16.7* 22.6*   HGB 12.9 8.3* 9.7*   MCV 58* 64* 63*    210 193      Most Recent 3 BMP's:  Recent Labs   Lab Test 03/27/20 2011 12/16/19  1117 11/19/19  1624 01/14/19  0649    139  --  143   POTASSIUM 3.6 4.4  --  3.8   CHLORIDE 108 110*  --  114*   CO2 26 24  --  24   BUN 21 12  --  10   CR 0.81 0.64 0.64 0.70   ANIONGAP 4 5  --  5   REMY 8.5 8.1*  --  7.0*   GLC 96 120*  --  87     Most Recent 2 LFT's:  Recent Labs   Lab Test 11/19/19  1624 01/13/19  0712 06/03/18  0410   AST 27 16 12   ALT 20 25 16   ALKPHOS  --  36* 44   BILITOTAL  --  0.6 0.3     Most Recent INR's and Anticoagulation Dosing History:  Anticoagulation Dose History     Recent Dosing and Labs Latest Ref Rng & Units 6/2/2018 1/13/2019    INR 0.86 - 1.14 1.08 1.21(H)        Most Recent 3 Troponin's:  Recent Labs   Lab Test 03/27/20 2011 06/03/18  0410 06/02/18  1958   TROPI 0.016 0.024 0.016     Most Recent Cholesterol Panel:  Recent Labs   Lab Test 03/28/20  0551   CHOL 164   LDL 97   HDL 51   TRIG 81     Most Recent TSH, T4 and A1c Labs:  Recent Labs   Lab Test 03/28/20  0551 12/16/19  1117   TSH  --  0.78   A1C 5.1  --      Results for orders placed or performed during the hospital encounter of 03/27/20   MR Brain w/o & w Contrast    Narrative    EXAM: MR BRAIN W/O and W CONTRAST  LOCATION: [facility]  DATE/TIME: 3/27/2020 8:29 PM    INDICATION: New left-sided paresthesias.  COMPARISON: MRI brain 06/02/2018.  CONTRAST: 10 mL Gadavist  TECHNIQUE:  Routine multiplanar multisequence head MRI without and with intravenous contrast.    FINDINGS: Mildly motion limited examination.  INTRACRANIAL CONTENTS: Small acute lacunar infarct along the lateral aspect of the right thalamus near its junction with the posterior limb of the right internal capsule. Chronic lacunar infarcts in the posterior right lentiform nucleus and caudate tail   have undergone expected evolution since previous MRI of 06/02/2018.. No hemorrhage or significant mass effect. Brain parenchyma and extra-axial spaces otherwise appear within normal limits.    SELLA: No abnormality accounting for technique.    OSSEOUS STRUCTURES/SOFT TISSUES: Normal marrow signal. The major intracranial vascular flow voids are maintained.     ORBITS: No abnormality accounting for technique.     SINUSES/MASTOIDS: Scattered paranasal sinus mucosal thickening with suggestion of an air-fluid level in the left maxillary sinus. Probable focal aerated secretions or potentially a small retention cyst along the anterior wall of the left maxillary sinus.   No middle ear or mastoid effusion.       Impression    IMPRESSION:  1.  Acute lacunar infarct near lateral margin of the right thalamus and the posterior limb of the right internal capsule. No hemorrhage or mass effect.  2.  Chronic right basal ganglia infarcts.  3.  Paranasal sinus mucosal thickening with an air-fluid level in the left maxillary sinus. Correlate clinically for symptoms/signs of acute sinusitis.       MR Head w/o Contrast Angiogram    Narrative    EXAM: MRA BRAIN ([place] [facility] [place]) WO CONTRAST  LOCATION: [facility]  DATE/TIME: 3/27/2020 8:30 PM    INDICATION: New left-sided paresthesias, history of stroke.  COMPARISON: CT angiogram of the head and neck dated 06/02/2018.  TECHNIQUE: 3D time-of-flight head MRA without intravenous contrast.    FINDINGS:  ANTERIOR CIRCULATION: No stenosis/occlusion, aneurysm, or high flow vascular malformation. Fetal  origin of both posterior cerebral arteries from the anterior circulation.    POSTERIOR CIRCULATION: No stenosis/occlusion, aneurysm, or high flow vascular malformation. Congenitally small vertebrobasilar system.       Impression    IMPRESSION:  No flow-limiting stenosis or large vessel occlusion of the major intracranial arterial vasculature.   MR Neck w/o & w Contrast Angiogram    Narrative    EXAM: MRA NECK (CAROTIDS) WO and W CONTRAST  LOCATION: [facility]  DATE/TIME: 3/27/2020 8:30 PM    INDICATION: New left-sided paresthesias in a patient with history of stroke.  COMPARISON: CTA head and neck 06/02/2018  CONTRAST: 10 mL Gadavist  TECHNIQUE: Neck MRA without and with IV contrast. Stenosis measurements made according to NASCET criteria unless otherwise specified.    FINDINGS:  Mildly limited exam due to artifacts.    RIGHT CAROTID: No measurable stenosis or gross dissection.    LEFT CAROTID: No measurable stenosis or gross dissection.    VERTEBRAL ARTERIES: No focal stenosis or gross dissection. Balanced vertebral arteries.    AORTIC ARCH: Classic aortic arch anatomy with no significant stenosis at the origin of the great vessels.      Impression    IMPRESSION:  1.  Normal neck MRA.

## 2020-03-28 NOTE — ED TRIAGE NOTES
Pt reports 3 days of left arm and leg numbness which she describes and pins and needles, notes cough and runny nose as well

## 2020-03-30 LAB
CARDIOLIPIN ANTIBODY IGG: <1.6 GPL-U/ML (ref 0–19.9)
CARDIOLIPIN ANTIBODY IGM: <0.2 MPL-U/ML (ref 0–19.9)
INTERPRETATION ECG - MUSE: NORMAL

## 2020-03-31 LAB
B2 GLYCOPROT1 IGG SERPL IA-ACNC: 0.8 U/ML
B2 GLYCOPROT1 IGM SERPL IA-ACNC: <2.9 U/ML
LA PPP-IMP: NEGATIVE

## 2020-11-08 ENCOUNTER — HEALTH MAINTENANCE LETTER (OUTPATIENT)
Age: 40
End: 2020-11-08

## 2021-03-09 ENCOUNTER — APPOINTMENT (OUTPATIENT)
Dept: URBAN - METROPOLITAN AREA CLINIC 253 | Age: 41
Setting detail: DERMATOLOGY
End: 2021-03-10

## 2021-03-09 VITALS — RESPIRATION RATE: 15 BRPM | WEIGHT: 140 LBS | HEIGHT: 60 IN

## 2021-03-09 DIAGNOSIS — L74.51 PRIMARY FOCAL HYPERHIDROSIS: ICD-10-CM

## 2021-03-09 DIAGNOSIS — L90.5 SCAR CONDITIONS AND FIBROSIS OF SKIN: ICD-10-CM

## 2021-03-09 DIAGNOSIS — L30.0 NUMMULAR DERMATITIS: ICD-10-CM

## 2021-03-09 DIAGNOSIS — L21.8 OTHER SEBORRHEIC DERMATITIS: ICD-10-CM

## 2021-03-09 PROBLEM — L74.510 PRIMARY FOCAL HYPERHIDROSIS, AXILLA: Status: ACTIVE | Noted: 2021-03-09

## 2021-03-09 PROBLEM — L30.9 DERMATITIS, UNSPECIFIED: Status: ACTIVE | Noted: 2021-03-09

## 2021-03-09 PROCEDURE — 99204 OFFICE O/P NEW MOD 45 MIN: CPT

## 2021-03-09 PROCEDURE — OTHER ADDITIONAL NOTES: OTHER

## 2021-03-09 PROCEDURE — OTHER COUNSELING: OTHER

## 2021-03-09 PROCEDURE — OTHER PRESCRIPTION: OTHER

## 2021-03-09 RX ORDER — ALUMINUM CHLORIDE 20 %
20% SOLUTION, NON-ORAL TOPICAL QHS
Qty: 1 | Refills: 3 | Status: ERX | COMMUNITY
Start: 2021-03-09

## 2021-03-09 RX ORDER — CLOBETASOL PROPIONATE 0.5 MG/ML
0.05% SOLUTION TOPICAL BID
Qty: 1 | Refills: 2 | Status: ERX | COMMUNITY
Start: 2021-03-09

## 2021-03-09 RX ORDER — TRIAMCINOLONE ACETONIDE 1 MG/G
0.1% CREAM TOPICAL BID
Qty: 1 | Refills: 2 | Status: ERX | COMMUNITY
Start: 2021-03-09

## 2021-03-09 ASSESSMENT — LOCATION SIMPLE DESCRIPTION DERM
LOCATION SIMPLE: CHEST
LOCATION SIMPLE: RIGHT AXILLARY VAULT
LOCATION SIMPLE: UPPER BACK
LOCATION SIMPLE: LEFT AXILLARY VAULT
LOCATION SIMPLE: RIGHT SCALP

## 2021-03-09 ASSESSMENT — LOCATION DETAILED DESCRIPTION DERM
LOCATION DETAILED: MIDDLE STERNUM
LOCATION DETAILED: LEFT AXILLARY VAULT
LOCATION DETAILED: RIGHT MEDIAL FRONTAL SCALP
LOCATION DETAILED: RIGHT AXILLARY VAULT
LOCATION DETAILED: SUPERIOR THORACIC SPINE

## 2021-03-09 ASSESSMENT — LOCATION ZONE DERM
LOCATION ZONE: TRUNK
LOCATION ZONE: AXILLAE
LOCATION ZONE: SCALP

## 2021-03-09 NOTE — PROCEDURE: ADDITIONAL NOTES
Render Risk Assessment In Note?: no
Detail Level: Simple
Additional Notes: Discussed rash is not active today but some PIH and scarring are present\\nRecommended patient be seen when flaring and try to take photos\\nDiscussed difficult to treat without seeing it active, patient understood\\nRecommended TMC for PIH lesions and may help with itchiness/ when rash is active
Additional Notes: A female nurse was present for the exam. Care instructions were explained to the patient in detail. Told patient to call with any concerns or questions. The patient verbalized understanding and agreement of the education provided and the treatment plan. Encouraged patient to schedule a follow up appointment right after visit. At the end of the visit, all questions had been answered and the patient was satisfied with the visit.

## 2021-03-09 NOTE — PROCEDURE: COUNSELING
Patient Specific Counseling (Will Not Stick From Patient To Patient): Recommended sunscreen and time to fade as she is not currently flaring. When her itchiness is active recommended she walk in.
Detail Level: Simple
Medical Necessity Information: LCD Guidelines vary from payer to payer. Please check with your payer's policy to determine medical necessity.
Detail Level: Zone
Medical Necessity Clause: Botulinum toxin hyperhidrosis therapy is medically necessary because

## 2021-03-09 NOTE — HPI: RASH
What Type Of Note Output Would You Prefer (Optional)?: Bullet Format
Is This A New Presentation, Or A Follow-Up?: Rash
Additional History: Itchy scalp, back and shoulders\\nSweating in armpits\\nRash comes and goes, worse in the summer. Nothing else seems to make it better or worse\\nNot flaring today, no photos \\nSweatiness, there is odor and bad wetness \\n

## 2021-09-12 ENCOUNTER — HEALTH MAINTENANCE LETTER (OUTPATIENT)
Age: 41
End: 2021-09-12

## 2022-01-02 ENCOUNTER — HEALTH MAINTENANCE LETTER (OUTPATIENT)
Age: 42
End: 2022-01-02

## 2022-10-30 ENCOUNTER — HEALTH MAINTENANCE LETTER (OUTPATIENT)
Age: 42
End: 2022-10-30

## 2023-04-08 ENCOUNTER — HEALTH MAINTENANCE LETTER (OUTPATIENT)
Age: 43
End: 2023-04-08

## 2023-05-18 ENCOUNTER — HOSPITAL ENCOUNTER (EMERGENCY)
Facility: CLINIC | Age: 43
Discharge: HOME | End: 2023-05-18
Attending: EMERGENCY MEDICINE | Admitting: EMERGENCY MEDICINE
Payer: COMMERCIAL

## 2023-05-18 VITALS
TEMPERATURE: 98.7 F | WEIGHT: 141 LBS | BODY MASS INDEX: 27.54 KG/M2 | DIASTOLIC BLOOD PRESSURE: 95 MMHG | SYSTOLIC BLOOD PRESSURE: 152 MMHG | RESPIRATION RATE: 18 BRPM | HEART RATE: 75 BPM | OXYGEN SATURATION: 100 %

## 2023-05-18 DIAGNOSIS — R68.84 JAW PAIN: ICD-10-CM

## 2023-05-18 LAB
ANION GAP SERPL CALCULATED.3IONS-SCNC: 10 MMOL/L (ref 7–15)
ATRIAL RATE - MUSE: 71 BPM
BASOPHILS # BLD AUTO: 0.1 10E3/UL (ref 0–0.2)
BASOPHILS NFR BLD AUTO: 1 %
BUN SERPL-MCNC: 18.7 MG/DL (ref 6–20)
CALCIUM SERPL-MCNC: 8.4 MG/DL (ref 8.6–10)
CHLORIDE SERPL-SCNC: 108 MMOL/L (ref 98–107)
CREAT SERPL-MCNC: 0.77 MG/DL (ref 0.51–0.95)
DEPRECATED HCO3 PLAS-SCNC: 23 MMOL/L (ref 22–29)
DIASTOLIC BLOOD PRESSURE - MUSE: NORMAL MMHG
EOSINOPHIL # BLD AUTO: 0.4 10E3/UL (ref 0–0.7)
EOSINOPHIL NFR BLD AUTO: 6 %
ERYTHROCYTE [DISTWIDTH] IN BLOOD BY AUTOMATED COUNT: 16.9 % (ref 10–15)
GFR SERPL CREATININE-BSD FRML MDRD: >90 ML/MIN/1.73M2
GLUCOSE SERPL-MCNC: 102 MG/DL (ref 70–99)
HCG SERPL QL: NEGATIVE
HCT VFR BLD AUTO: 32.2 % (ref 35–47)
HGB BLD-MCNC: 10.8 G/DL (ref 11.7–15.7)
HOLD SPECIMEN: NORMAL
HOLD SPECIMEN: NORMAL
IMM GRANULOCYTES # BLD: 0 10E3/UL
IMM GRANULOCYTES NFR BLD: 1 %
INTERPRETATION ECG - MUSE: NORMAL
LYMPHOCYTES # BLD AUTO: 0.9 10E3/UL (ref 0.8–5.3)
LYMPHOCYTES NFR BLD AUTO: 14 %
MCH RBC QN AUTO: 19.6 PG (ref 26.5–33)
MCHC RBC AUTO-ENTMCNC: 33.5 G/DL (ref 31.5–36.5)
MCV RBC AUTO: 58 FL (ref 78–100)
MONOCYTES # BLD AUTO: 0.4 10E3/UL (ref 0–1.3)
MONOCYTES NFR BLD AUTO: 6 %
NEUTROPHILS # BLD AUTO: 4.8 10E3/UL (ref 1.6–8.3)
NEUTROPHILS NFR BLD AUTO: 72 %
NRBC # BLD AUTO: 0 10E3/UL
NRBC BLD AUTO-RTO: 0 /100
P AXIS - MUSE: 12 DEGREES
PLAT MORPH BLD: ABNORMAL
PLATELET # BLD AUTO: 235 10E3/UL (ref 150–450)
POTASSIUM SERPL-SCNC: 3.4 MMOL/L (ref 3.4–5.3)
PR INTERVAL - MUSE: 180 MS
QRS DURATION - MUSE: 80 MS
QT - MUSE: 442 MS
QTC - MUSE: 480 MS
R AXIS - MUSE: 65 DEGREES
RBC # BLD AUTO: 5.51 10E6/UL (ref 3.8–5.2)
RBC MORPH BLD: ABNORMAL
SODIUM SERPL-SCNC: 141 MMOL/L (ref 136–145)
SYSTOLIC BLOOD PRESSURE - MUSE: NORMAL MMHG
T AXIS - MUSE: 33 DEGREES
TARGETS BLD QL SMEAR: SLIGHT
TROPONIN T SERPL HS-MCNC: 11 NG/L
TROPONIN T SERPL HS-MCNC: 12 NG/L
VENTRICULAR RATE- MUSE: 71 BPM
WBC # BLD AUTO: 6.6 10E3/UL (ref 4–11)

## 2023-05-18 PROCEDURE — 93005 ELECTROCARDIOGRAM TRACING: CPT

## 2023-05-18 PROCEDURE — 99284 EMERGENCY DEPT VISIT MOD MDM: CPT

## 2023-05-18 PROCEDURE — 85025 COMPLETE CBC W/AUTO DIFF WBC: CPT | Performed by: EMERGENCY MEDICINE

## 2023-05-18 PROCEDURE — 82310 ASSAY OF CALCIUM: CPT | Performed by: EMERGENCY MEDICINE

## 2023-05-18 PROCEDURE — 84484 ASSAY OF TROPONIN QUANT: CPT | Performed by: EMERGENCY MEDICINE

## 2023-05-18 PROCEDURE — 84703 CHORIONIC GONADOTROPIN ASSAY: CPT | Performed by: EMERGENCY MEDICINE

## 2023-05-18 PROCEDURE — 84484 ASSAY OF TROPONIN QUANT: CPT | Mod: 91 | Performed by: EMERGENCY MEDICINE

## 2023-05-18 PROCEDURE — 36415 COLL VENOUS BLD VENIPUNCTURE: CPT | Performed by: EMERGENCY MEDICINE

## 2023-05-18 ASSESSMENT — ACTIVITIES OF DAILY LIVING (ADL)
ADLS_ACUITY_SCORE: 35
ADLS_ACUITY_SCORE: 33

## 2023-05-18 NOTE — ED TRIAGE NOTES
C/o bilateral lower jaw and arm pain. Started at 0930 today after dropping child off at school. Denies CP, SOB, fever. C/o left arm tingling. H/o stroke in 2018 and 2019. Reports symptoms feel different than previous strokes. ABCs intact. A/Ox4.

## 2023-05-18 NOTE — ED PROVIDER NOTES
"  History     Chief Complaint:  Jaw Pain and Arm Pain     The history is provided by the patient.      Mey Kim is a 43 year old female with a history of stroke on aspirin 81 mg who presents to the ED for evaluation of jaw pain and bilateral arm pain which radiates to her chest with associated diaphoresis. The symptoms were the worst when they initially started and have since improved but have not resolved completely. She notes she dropped her son off at the bus as she walked up the stairs 4 hours ago and the symptoms started and lasted approximately 5-8 minutes. She notes she had associated shortness of breath which has since resolved. She notes she had a \"slight\" headache after the onset of the pain. She denies dizziness, abdominal pain, fever, back pain, throat swelling, shoulder pain, cough, or cold symptoms. In ED, she notes she is still feeling jaw pain and bilateral arm pain. She denies broken teeth, throat swelling, or dental pain. She denies injury or falls. She denies any patterns to provocation of the pain. She notes a history of stroke and she takes aspirin, blood pressure, and cholesterol medications. Her symptoms were left-sided weakness. She notes she now has left leg weakness at baseline. She has been taking these medications regularly.  She denies a known trigger to this incident. She denies pain with last stroke. She denies any other symptoms.     Independent Historian:   None - Patient Only    Review of External Notes:  none    ROS:  See HPI    Allergies:  Lisinopril     Physical Exam     Patient Vitals for the past 24 hrs:   BP Temp Temp src Pulse Resp SpO2 Weight   05/18/23 1330 (!) 152/95 -- -- 75 -- 100 % --   05/18/23 1315 (!) 159/82 -- -- -- -- -- --   05/18/23 1154 (!) 144/95 98.7  F (37.1  C) Temporal 70 18 100 % 64 kg (141 lb)        Physical Exam  General: Well appearing, nontoxic. Resting comfortably  Head:  Scalp, face, and head appear normal  Eyes:  Pupils are equal, " round    Conjunctivae non-injected and sclerae white  ENT:    The external nose is normal    Pinnae are normal  Neck:  Normal range of motion    There is no rigidity noted    Trachea is in the midline  CV:  Regular rate and rhythm     Normal S1/S2, no S3/S4    No murmur or rub. Radial pulses 2+ bilaterally.  Resp:  Lungs are clear and equal bilaterally  There is no tachypnea    No increased work of breathing    No rales, wheezing, or rhonchi  GI:  Abdomen is soft, no rigidity or guarding    No distension, or mass    No tenderness or rebound tenderness   MS:  Normal muscular tone    Symmetric motor strength    No lower extremity edema. No calf swelling or tenderness.  Skin:  No rash or acute skin lesions noted  Neuro: A&Ox3, GCS 15    CN II - XII intact    Speech clear, fluent, and normal    Strength 5/5 and symmetric in bilateral upper and lower extremities.    No pronator drift. No leg drift. SILT throughout.    No ankle clonus    FTN testing normal. No tremor.     No meningismus   Psych:  Normal affect. Appropriate interactions.      Emergency Department Course     ECG  ECG taken at 1202, ECG read at 1332  Normal sinus rhythm  Septal infarct, age undetermined   No significant change as compared to prior, dated 03/27/2020.  Rate 71 bpm. NV interval 180 ms. QRS duration 80 ms. QT/QTc 442/480 ms. P-R-T axes 12 65 33.     Laboratory:  Labs Ordered and Resulted from Time of ED Arrival to Time of ED Departure   BASIC METABOLIC PANEL - Abnormal       Result Value    Sodium 141      Potassium 3.4      Chloride 108 (*)     Carbon Dioxide (CO2) 23      Anion Gap 10      Urea Nitrogen 18.7      Creatinine 0.77      Calcium 8.4 (*)     Glucose 102 (*)     GFR Estimate >90     CBC WITH PLATELETS AND DIFFERENTIAL - Abnormal    WBC Count 6.6      RBC Count 5.51 (*)     Hemoglobin 10.8 (*)     Hematocrit 32.2 (*)     MCV 58 (*)     MCH 19.6 (*)     MCHC 33.5      RDW 16.9 (*)     Platelet Count 235      % Neutrophils 72      %  Lymphocytes 14      % Monocytes 6      % Eosinophils 6      % Basophils 1      % Immature Granulocytes 1      NRBCs per 100 WBC 0      Absolute Neutrophils 4.8      Absolute Lymphocytes 0.9      Absolute Monocytes 0.4      Absolute Eosinophils 0.4      Absolute Basophils 0.1      Absolute Immature Granulocytes 0.0      Absolute NRBCs 0.0     RBC AND PLATELET MORPHOLOGY - Abnormal    Platelet Assessment        Value: Automated Count Confirmed. Platelet morphology is normal.    Target Cells Slight (*)     RBC Morphology Confirmed RBC Indices     TROPONIN T, HIGH SENSITIVITY - Normal    Troponin T, High Sensitivity 11     HCG QUALITATIVE PREGNANCY - Normal    hCG Serum Qualitative Negative     TROPONIN T, HIGH SENSITIVITY - Normal    Troponin T, High Sensitivity 12        Emergency Department Course & Assessments:    Interventions:  Medications - No data to display     Assessments, Independent Interpretation, Consult/Discussion of ManagementTests:  ED Course as of 05/18/23 1700   Thu May 18, 2023   1341 I obtained the patient's history and examined as noted above.      Social Determinants of Health affecting care:  None    Disposition:  The patient was discharged to home.     Impression & Plan      Medical Decision Making:  Mey Kim is a 43 year old female who presents to the ED for evaluation of evaluation of an episode of jaw and bilateral shoulder pain. On my evaluation the patient is well appearing, hemodynamically stable and afebrile. No focal neurologic deficits or other neuro symptoms. Findings not consistent with stroke. No trauma or injury. Symptoms are atypical for ACS. EKG neg for acute ischemia or dysrhythmia. Troponin neg x2. Patient denies any dental concerns or dental pain. No facial swelling, soft tissue infection. Labs otherwise reassuring. Symptoms likely musculoskeletal in origin. No clinical findings to suggest primary pulmonary pathology. Reassurance was provided. Supportive care at home  with tylenol and ibuprofen. Close follow up with PCP if sympotms are persistent or return to the ED for any worsening. Return precautions were discussed with patient. The patient's questions were answered and the patient was agreeable with discharge.       Diagnosis:    ICD-10-CM    1. Jaw pain  R68.84            Discharge Medications:  Discharge Medication List as of 2023  2:49 PM         Scribe Disclosure:  I, Lou Dyer, am serving as a scribe at 1:07 PM on 2023 to document services personally performed by Krish Ba MD based on my observations and the provider's statements to me.     2023   Krish Ba MD       Historical Data:  ______________________________________________________________________  Medications:    Folic acid  Rosuvastatin  Losartan  Etonogestrel-ethinyl estradiol nuvaring  Aspirin 81 MG    Past Medical History:   Past Surgical History:     Past Medical History:   Diagnosis Date     CVA (cerebral vascular accident) (H) 2018     GIB (gastrointestinal bleeding) 2019     High cholesterol      Hypertension     Past Surgical History:   Procedure Laterality Date      SECTION N/A 2019    Procedure: PRIMARY  SECTION;  Surgeon: Karon Eugene MD;  Location: [facility] L[facility]     DILATION AND CURETTAGE       DILATION AND CURETTAGE SUCTION WITH ULTRASOUND GUIDANCE N/A 3/31/2017    Procedure: DILATION AND CURETTAGE SUCTION WITH ULTRASOUND GUIDANCE;  Surgeon: Cary Ascencio MD;  Location: [facility] OR      Patient Active Problem List    Diagnosis Date Noted     Acute lacunar infarction (H) 2020     Priority: Medium     Hemorrhage from placenta previa 2019     Priority: Medium     Encounter for triage in pregnant patient 2019     Priority: Medium     GI bleed 2019     Priority: Medium     CVA (cerebral vascular accident) (H) 2018     Priority: Medium     S/P dilation and curettage 2017     Priority: Medium          Family  History:    None Social History:  Patient presents alone.  Patient arrived via car.      PCP: Luzmaria Aponte Ryan Clay, MD  05/18/23 [id number]

## 2023-08-18 ENCOUNTER — HOSPITAL ENCOUNTER (EMERGENCY)
Facility: CLINIC | Age: 43
Discharge: HOME | End: 2023-08-19
Attending: EMERGENCY MEDICINE | Admitting: EMERGENCY MEDICINE
Payer: COMMERCIAL

## 2023-08-18 DIAGNOSIS — R20.0 LEFT FACIAL NUMBNESS: ICD-10-CM

## 2023-08-18 DIAGNOSIS — R20.0 LEFT ARM NUMBNESS: ICD-10-CM

## 2023-08-18 LAB
ANION GAP SERPL CALCULATED.3IONS-SCNC: 8 MMOL/L (ref 7–15)
APTT PPP: 29 SECONDS (ref 22–38)
BASOPHILS # BLD AUTO: 0.1 10E3/UL (ref 0–0.2)
BASOPHILS NFR BLD AUTO: 1 %
BUN SERPL-MCNC: 18.2 MG/DL (ref 6–20)
CALCIUM SERPL-MCNC: 9.5 MG/DL (ref 8.6–10)
CHLORIDE SERPL-SCNC: 103 MMOL/L (ref 98–107)
CREAT SERPL-MCNC: 1.18 MG/DL (ref 0.51–0.95)
DEPRECATED HCO3 PLAS-SCNC: 29 MMOL/L (ref 22–29)
EOSINOPHIL # BLD AUTO: 0.2 10E3/UL (ref 0–0.7)
EOSINOPHIL NFR BLD AUTO: 3 %
ERYTHROCYTE [DISTWIDTH] IN BLOOD BY AUTOMATED COUNT: 16.4 % (ref 10–15)
GFR SERPL CREATININE-BSD FRML MDRD: 59 ML/MIN/1.73M2
GLUCOSE SERPL-MCNC: 92 MG/DL (ref 70–99)
HCT VFR BLD AUTO: 33.5 % (ref 35–47)
HGB BLD-MCNC: 11.1 G/DL (ref 11.7–15.7)
HOLD SPECIMEN: NORMAL
IMM GRANULOCYTES # BLD: 0 10E3/UL
IMM GRANULOCYTES NFR BLD: 0 %
INR PPP: 0.94 (ref 0.85–1.15)
LYMPHOCYTES # BLD AUTO: 1.3 10E3/UL (ref 0.8–5.3)
LYMPHOCYTES NFR BLD AUTO: 19 %
MCH RBC QN AUTO: 19.1 PG (ref 26.5–33)
MCHC RBC AUTO-ENTMCNC: 33.1 G/DL (ref 31.5–36.5)
MCV RBC AUTO: 58 FL (ref 78–100)
MONOCYTES # BLD AUTO: 0.5 10E3/UL (ref 0–1.3)
MONOCYTES NFR BLD AUTO: 8 %
NEUTROPHILS # BLD AUTO: 4.8 10E3/UL (ref 1.6–8.3)
NEUTROPHILS NFR BLD AUTO: 69 %
NRBC # BLD AUTO: 0 10E3/UL
NRBC BLD AUTO-RTO: 0 /100
PLAT MORPH BLD: NORMAL
PLATELET # BLD AUTO: 226 10E3/UL (ref 150–450)
POTASSIUM SERPL-SCNC: 3.8 MMOL/L (ref 3.4–5.3)
RBC # BLD AUTO: 5.8 10E6/UL (ref 3.8–5.2)
RBC MORPH BLD: NORMAL
SODIUM SERPL-SCNC: 140 MMOL/L (ref 136–145)
WBC # BLD AUTO: 6.9 10E3/UL (ref 4–11)

## 2023-08-18 PROCEDURE — 80048 BASIC METABOLIC PNL TOTAL CA: CPT | Performed by: EMERGENCY MEDICINE

## 2023-08-18 PROCEDURE — 85730 THROMBOPLASTIN TIME PARTIAL: CPT | Performed by: EMERGENCY MEDICINE

## 2023-08-18 PROCEDURE — 93005 ELECTROCARDIOGRAM TRACING: CPT

## 2023-08-18 PROCEDURE — 85610 PROTHROMBIN TIME: CPT | Performed by: EMERGENCY MEDICINE

## 2023-08-18 PROCEDURE — 36415 COLL VENOUS BLD VENIPUNCTURE: CPT | Performed by: EMERGENCY MEDICINE

## 2023-08-18 PROCEDURE — 85004 AUTOMATED DIFF WBC COUNT: CPT | Performed by: EMERGENCY MEDICINE

## 2023-08-18 PROCEDURE — 99285 EMERGENCY DEPT VISIT HI MDM: CPT | Mod: 25

## 2023-08-18 ASSESSMENT — ACTIVITIES OF DAILY LIVING (ADL): ADLS_ACUITY_SCORE: 35

## 2023-08-19 ENCOUNTER — APPOINTMENT (OUTPATIENT)
Dept: MRI IMAGING | Facility: CLINIC | Age: 43
End: 2023-08-19
Attending: EMERGENCY MEDICINE
Payer: COMMERCIAL

## 2023-08-19 VITALS
WEIGHT: 140 LBS | HEART RATE: 70 BPM | RESPIRATION RATE: 16 BRPM | BODY MASS INDEX: 27.34 KG/M2 | DIASTOLIC BLOOD PRESSURE: 97 MMHG | OXYGEN SATURATION: 100 % | SYSTOLIC BLOOD PRESSURE: 141 MMHG | TEMPERATURE: 97.5 F

## 2023-08-19 PROCEDURE — 70549 MR ANGIOGRAPH NECK W/O&W/DYE: CPT

## 2023-08-19 PROCEDURE — 255N000002 HC RX 255 OP 636: Performed by: EMERGENCY MEDICINE

## 2023-08-19 PROCEDURE — 70553 MRI BRAIN STEM W/O & W/DYE: CPT

## 2023-08-19 PROCEDURE — 70544 MR ANGIOGRAPHY HEAD W/O DYE: CPT | Mod: XU

## 2023-08-19 PROCEDURE — A9585 GADOBUTROL INJECTION: HCPCS | Performed by: EMERGENCY MEDICINE

## 2023-08-19 RX ORDER — GADOBUTROL 604.72 MG/ML
10 INJECTION INTRAVENOUS ONCE
Status: COMPLETED | OUTPATIENT
Start: 2023-08-19 | End: 2023-08-19

## 2023-08-19 RX ADMIN — GADOBUTROL 10 ML: 604.72 INJECTION INTRAVENOUS at 03:16

## 2023-08-19 ASSESSMENT — ACTIVITIES OF DAILY LIVING (ADL)
ADLS_ACUITY_SCORE: 35

## 2023-08-19 NOTE — ED TRIAGE NOTES
Left arm numbness since yesterday. Left facial numbness started at 2100. Hx of stroke. On ASA.

## 2023-08-19 NOTE — ED NOTES
MRI follow-up:    MR Brain w/o & w Contrast (Final result)  Result time 08/19/23 04:39:09  Final result by Dayton Ward MD (08/19/23 04:39:09)                Impression:    IMPRESSION:  HEAD MRI:  1.  Motion degraded exam.  2.  No definite acute intracranial pathology identified.  3.  Chronic lacunar infarct right corona radiata.    HEAD MRA:  1.  The exam is moderately degraded by motion.  2.  No definite proximal branch occlusion, significant flow-limiting stenosis or aneurysm.    NECK MRA:  No measurable stenosis or dissection.              Discussed findings with the patient.  She is asymptomatic at this point.  No indication for admission or neurologic consultation.     Sixto Perkins MD  08/19/23 [id number]

## 2023-08-19 NOTE — ED PROVIDER NOTES
History     Chief Complaint:  Numbness       The history is provided by the patient.      Mey Kim is a 43 year old female with history of CVAs in 2018 and 2020 who presents to the ED for evaluation of one sided numbness. The patient provides that yesterday morning after waking up, she began developing a left arm numbness that she describes as being intermittent. This continued into today and while showering, she noticed that she started having this numbness in her left face, prompting her to come to the ED. She also complains of some nausea today but denies any headache, weakness in her extremities, vision changes, vomiting, falls, or injuries. No gait or balance problems. No leg involvement. The patient has had 2 prior CVAs, with her most recent being in 2020. She notes that with her previous strokes, she had different symptoms for the first one, but she had somewhat similar symptoms when compared to her second. Notes that she has had residual numbness in her left leg since the most recent stroke and she has no changes with this today. No fever, rash. She denies any other symptoms.    Independent Historian:   None - Patient Only    Review of External Notes:  [facility]    Lacunar infarction 06/26/2018   Overview:    June 2018 and March 2020. June 2018 acute lacunar infarction in the right corona radiata extending into posterior limb of right internal capsule. March 2020 - acute lacunar infarct near the lateral margin of the right thalamus and posterior limb of the right internal capsule. Neurology recommends DAPT with ASA 81 mg + Plavix 75 mg for 21 days, then ASA 81 mg alone indefinitely. BP goal < 140/90.     ROS:  See HPI    Allergies:  Lisinopril     Physical Exam   Patient Vitals for the past 24 hrs:   BP Temp Temp src Pulse Resp SpO2 Weight   08/19/23 0045 (!) 143/91 -- -- 78 -- 100 % --   08/19/23 0030 130/82 -- -- 51 -- 100 % --   08/19/23 0015 138/84 --  -- 69 -- 99 % --   08/19/23 0000 (!) 143/93 -- -- 77 -- 100 % --   08/18/23 2300 128/85 -- -- 55 -- 100 % --   08/18/23 2245 123/75 -- -- 60 -- 100 % --   08/18/23 2230 126/76 -- -- 71 -- 99 % --   08/18/23 2215 128/77 -- -- 81 -- 99 % --   08/18/23 2200 116/75 -- -- 74 -- 100 % --   08/18/23 2145 125/61 -- -- 78 -- 100 % --   08/18/23 2119 (!) 151/90 97.5  F (36.4  C) Temporal 92 16 100 % 63.5 kg (140 lb)        Physical Exam  General: Well appearing, nontoxic. Resting comfortably  Head:  Scalp, face, and head appear normal  Eyes:  Pupils are equal, round, reactive to light EOMI, no nystagmus     Conjunctivae non-injected and sclerae white  ENT:    The external nose is normal    Pinnae are normal  Neck:  Normal range of motion    There is no rigidity noted    Trachea is in the midline  CV:  Regular rate and rhythm     Normal S1/S2, no S3/S4    No murmur or rub. Radial pulses 2+ bilaterally.  Resp:  Lungs are clear and equal bilaterally  There is no tachypnea    No increased work of breathing    No rales, wheezing, or rhonchi  GI:  Abdomen is soft, no rigidity or guarding    No distension, or mass    No tenderness or rebound tenderness   MS:  Normal muscular tone    Symmetric motor strength    No lower extremity edema  Skin:  No rash or acute skin lesions noted  Neuro:  A&Ox3, GCS 15    CN II - XII intact with the exception of subjective sensory change to the left face.    Speech clear, fluent, and normal    Strength 5/5 and symmetric in bilateral upper and lower extremities.    No pronator drift. No leg drift. SILT throughout. Patient reports decreased sensation throughout the left upper extremity in a nondermatomal distribution.     No ankle clonus    FTN testing normal. No tremor.     No meningismus   Psych: Normal affect. Appropriate interactions.      Emergency Department Course     ECG  ECG results from 08/18/23   EKG 12-lead, tracing only     Value    Systolic Blood Pressure     Diastolic Blood Pressure      Ventricular Rate 70    Atrial Rate 70    NY Interval 186    QRS Duration 80        QTc 483    P Axis 26    R AXIS 65    T Axis 53    Interpretation ECG      Sinus rhythm  Prolonged QT  Abnormal ECG  When compared with ECG of 18-MAY-2023 12:02,  No significant change was found         Imaging:  MR Brain w/o & w Contrast    (Results Pending)   MRA Angiogram Head w/o Contrast    (Results Pending)   MRA Angiogram Neck w/o & w Contrast    (Results Pending)      Report per radiology    Laboratory:  Labs Ordered and Resulted from Time of ED Arrival to Time of ED Departure   BASIC METABOLIC PANEL - Abnormal       Result Value    Sodium 140      Potassium 3.8      Chloride 103      Carbon Dioxide (CO2) 29      Anion Gap 8      Urea Nitrogen 18.2      Creatinine 1.18 (*)     Calcium 9.5      Glucose 92      GFR Estimate 59 (*)    CBC WITH PLATELETS AND DIFFERENTIAL - Abnormal    WBC Count 6.9      RBC Count 5.80 (*)     Hemoglobin 11.1 (*)     Hematocrit 33.5 (*)     MCV 58 (*)     MCH 19.1 (*)     MCHC 33.1      RDW 16.4 (*)     Platelet Count 226      % Neutrophils 69      % Lymphocytes 19      % Monocytes 8      % Eosinophils 3      % Basophils 1      % Immature Granulocytes 0      NRBCs per 100 WBC 0      Absolute Neutrophils 4.8      Absolute Lymphocytes 1.3      Absolute Monocytes 0.5      Absolute Eosinophils 0.2      Absolute Basophils 0.1      Absolute Immature Granulocytes 0.0      Absolute NRBCs 0.0     INR - Normal    INR 0.94     PARTIAL THROMBOPLASTIN TIME - Normal    aPTT 29     RBC AND PLATELET MORPHOLOGY    Platelet Assessment        Value: Automated Count Confirmed. Platelet morphology is normal.    RBC Morphology Confirmed RBC Indices     GLUCOSE MONITOR NURSING POCT        Emergency Department Course & Assessments:       Interventions:  Medications - No data to display     Assessments, Independent Interpretation, Consult/Discussion of ManagementTests:  ED Course as of 08/19/23 0125   Fri Aug 18, 2023    2133 I obtained history and examined the patient as noted above.       Social Determinants of Health affecting care:  None    Disposition:  Care of the patient was transferred to my colleague Dr. Gabriel pending MRI results.     Impression & Plan      Medical Decision Making:  Mey Kim is a 43 year old female who presents to the ED for evaluation of left facial and arm numbness and sensory change in the setting of prior two lacunar infarcts involving the right corona radiata and internal capsule as well as the thalamus.  Patient has been on aspirin 81 mg indefinitely since then.  Symptoms greater than 24 hours in duration and therefore no indication for code stroke.  No motor deficits.  No cranial nerve deficits other than sensory changes to the face.  Laboratory studies are largely reassuring.  Minimal chronic anemia.  Mild elevation in creatinine to 1.18.  EKG without ischemia or dysrhythmia.  There is mildly prolonged QTc.  Given the findings and symptoms concern is for an acute neurologic event.  MRI and MRA of the head and neck are ordered and pending.  The patient was signed out to my partner Dr. Gabriel pending MRI results.  Ultimate disposition will be dependent on these findings.  Patient was signed out in stable condition.    The patient has stroke symptoms:         ED Stroke specific documentation           NIHSS PDF     Thrombolytics:   Not given due to:   - unclear or unfavorable risk-benefit profile for extended window thrombolysis beyond the conventional 4.5 hour time window    If treating with thrombolytics: Ensure SBP<180 and DBP<105 prior to treatment with thrombolytics.  Administering thrombolytics after treatment with IV labetalol, hydralazine, or nicardipine is reasonable once BP control is established.    Endovascular Retrieval:  Not initiated due to absence of proximal vessel occlusion    National [employer] Stroke Scale (Baseline)  Time Performed: Arrival     Score     Level of consciousness: (0)   Alert, keenly responsive    LOC questions: (0)   Answers both questions correctly    LOC commands: (0)   Performs both tasks correctly    Best gaze: (0)   Normal    Visual: (0)   No visual loss    Facial palsy: (0)   Normal symmetrical movements    Motor arm (left): (0)   No drift    Motor arm (right): (0)   No drift    Motor leg (left): (0)   No drift    Motor leg (right): (0)   No drift    Limb ataxia: (0)   Absent    Sensory: (1)   Mild to moderate sensory loss    Best language: (0)   Normal- no aphasia    Dysarthria: (0)   Normal    Extinction and inattention: (0)   No abnormality        Total Score:  1        Stroke Mimics were considered (including migraine headache, seizure disorder, hypoglycemia (or hyperglycemia), head or spinal trauma, CNS infection, Toxin ingestion and shock state (e.g. sepsis) .        Diagnosis:    ICD-10-CM    1. Left arm numbness  R20.0       2. Left facial numbness  R20.0              Scribe Disclosure:  I, Robin Bangura, am serving as a scribe at 12:52 AM on 2023 to document services personally performed by Krish Ba MD based on my observations and the provider's statements to me.    Historical Data:  ______________________________________________________________________  Medications:    acetaminophen (TYLENOL) 325 MG tablet  aspirin (ASA) 81 MG chewable tablet  clindamycin (CLEOCIN T) 1 % external lotion  clopidogrel (PLAVIX) 75 MG tablet  fluocinonide (LIDEX) 0.05 % external solution  NIFEdipine ER OSMOTIC (PROCARDIA XL) 30 MG 24 hr tablet  rosuvastatin (CRESTOR) 20 MG tablet        Past Medical History:   Past Surgical History:     Past Medical History:   Diagnosis Date    CVA (cerebral vascular accident) (H) 2018    GIB (gastrointestinal bleeding) 2019    High cholesterol     Hypertension     Past Surgical History:   Procedure Laterality Date     SECTION N/A 2019    Procedure: PRIMARY  SECTION;  Surgeon:  Karon Eugene MD;  Location: [facility] L+D    DILATION AND CURETTAGE      DILATION AND CURETTAGE SUCTION WITH ULTRASOUND GUIDANCE N/A 3/31/2017    Procedure: DILATION AND CURETTAGE SUCTION WITH ULTRASOUND GUIDANCE;  Surgeon: Cary Ascencio MD;  Location: [facility] OR      Patient Active Problem List    Diagnosis Date Noted    Acute lacunar infarction (H) 03/28/2020     Priority: Medium    Hemorrhage from placenta previa 12/16/2019     Priority: Medium    Encounter for triage in pregnant patient 07/06/2019     Priority: Medium    GI bleed 01/13/2019     Priority: Medium    CVA (cerebral vascular accident) (H) 06/02/2018     Priority: Medium    S/P dilation and curettage 03/31/2017     Priority: Medium          Family History:    family history is not on file. Social History:   reports that she has quit smoking. She quit smokeless tobacco use about 4 years ago. She reports current alcohol use. She reports current drug use. Drug: Marijuana.     PCP: Andre, [facility] [place] Daro, Ryan Clay, MD  08/19/23 [id number]

## 2023-08-21 LAB
ATRIAL RATE - MUSE: 70 BPM
DIASTOLIC BLOOD PRESSURE - MUSE: NORMAL MMHG
INTERPRETATION ECG - MUSE: NORMAL
P AXIS - MUSE: 26 DEGREES
PR INTERVAL - MUSE: 186 MS
QRS DURATION - MUSE: 80 MS
QT - MUSE: 448 MS
QTC - MUSE: 483 MS
R AXIS - MUSE: 65 DEGREES
SYSTOLIC BLOOD PRESSURE - MUSE: NORMAL MMHG
T AXIS - MUSE: 53 DEGREES
VENTRICULAR RATE- MUSE: 70 BPM

## 2024-06-09 ENCOUNTER — HEALTH MAINTENANCE LETTER (OUTPATIENT)
Age: 44
End: 2024-06-09

## 2025-02-01 ENCOUNTER — HEALTH MAINTENANCE LETTER (OUTPATIENT)
Age: 45
End: 2025-02-01

## 2025-06-15 ENCOUNTER — HEALTH MAINTENANCE LETTER (OUTPATIENT)
Age: 45
End: 2025-06-15

## (undated) DEVICE — SOL NACL 0.9% IRRIG 1000ML BOTTLE 2F7124

## (undated) DEVICE — FILTER BERKLEY SAFETOUCH

## (undated) DEVICE — SUCTION CANNULA UTERINE 07MM CVD  21853

## (undated) DEVICE — CATH TRAY FOLEY SURESTEP 16FR DRAIN BAG STATOCK A899916

## (undated) DEVICE — SOL WATER IRRIG 1000ML BOTTLE 2F7114

## (undated) DEVICE — SOLIDIFIER FLUID RED 1500ML LIQUID KIT SYS POWDER ISOB1500

## (undated) DEVICE — GLOVE PROTEXIS W/NEU-THERA 5.5  2D73TE55

## (undated) DEVICE — SU PLAIN 3-0 CT 27" 852H

## (undated) DEVICE — CAP BABY PINK/BLUE IC-2

## (undated) DEVICE — Device

## (undated) DEVICE — GLOVE PROTEXIS W/NEU-THERA 6.0  2D73TE60

## (undated) DEVICE — SUCTION CANISTER MEDIVAC LINER 3000ML W/LID 65651-530

## (undated) DEVICE — LINEN FULL SHEET 5511

## (undated) DEVICE — BLADE KNIFE SURG 10 371110

## (undated) DEVICE — GLOVE PROTEXIS POWDER FREE SMT 6.0  2D72PT60X

## (undated) DEVICE — GLOVE PROTEXIS BLUE W/NEU-THERA 7.5  2D73EB75

## (undated) DEVICE — GLOVE PROTEXIS POWDER FREE SMT 6.5  2D72PT65X

## (undated) DEVICE — LINEN TOWEL PACK X5 5464

## (undated) DEVICE — SPECIMEN TRAP VACUUM SUCTION 003984-901

## (undated) DEVICE — LINEN BABY BLANKET 5434

## (undated) DEVICE — LINEN TOWEL PACK X10 5473

## (undated) DEVICE — PREP CHLORAPREP 26ML TINTED ORANGE  260815

## (undated) DEVICE — GLOVE PROTEXIS BLUE W/NEU-THERA 6.0  2D73EB60

## (undated) DEVICE — TRANSFER DEVICE BLOOD NDL HOLDER 364880

## (undated) DEVICE — GLOVE PROTEXIS W/NEU-THERA 7.5  2D73TE75

## (undated) DEVICE — STOCKING SLEEVE VASOPRESS COMPRESSION CALF MED 18" VP501M

## (undated) DEVICE — HEMOSTAT ABSORBABLE ARISTA 5GM SM0007-USA

## (undated) DEVICE — PACK C-SECTION LF PL15OTA83B

## (undated) DEVICE — PACK MINOR LITHOTOMY RIDGES

## (undated) DEVICE — GLOVE PROTEXIS BLUE W/NEU-THERA 8.0  2D73EB80

## (undated) DEVICE — SU VICRYL 4-0 PS-2 18" UND J496H

## (undated) DEVICE — TUBING VACUUM COLLECTION 6FT 23116

## (undated) DEVICE — LINEN HALF SHEET 5512

## (undated) DEVICE — SUCTION CANNULA UTERINE 08MM CVD  20317

## (undated) DEVICE — BAG CLEAR TRASH 1.3M 39X33" P4040C

## (undated) DEVICE — GLOVE PROTEXIS BLUE W/NEU-THERA 6.5  2D73EB65

## (undated) DEVICE — SUCTION CANNULA UTERINE 10MM CVD  21553

## (undated) DEVICE — LINEN DRAPE 54X72" 5467

## (undated) DEVICE — SYR 50ML LL W/O NDL 309653

## (undated) DEVICE — ESU GROUND PAD ADULT W/CORD E7507

## (undated) DEVICE — SU MONOCRYL 0 CT-1 36" UND Y946H

## (undated) DEVICE — SU VICRYL 0 CT-1 36" J346H

## (undated) DEVICE — BLADE CLIPPER SGL USE 9680

## (undated) DEVICE — ADH SKIN CLOSURE PREMIERPRO EXOFIN 1.0ML 3470

## (undated) DEVICE — CATH INTERMITTENT CLEAN-CATH FEMALE 14FR 6" VINYL LF 420614

## (undated) DEVICE — PAD CHUX UNDERPAD 30X36" P3036C

## (undated) DEVICE — TUBING OXYGEN CANNULA NASAL 7FT

## (undated) DEVICE — CLIP HEMOCLIP ENDOSCOPIC INSTINCT 2.8X230CM INSC-7-230-SS

## (undated) DEVICE — DILATOR PROBE UTERINE OS CANAL FINDER 260-610

## (undated) RX ORDER — DEXAMETHASONE SODIUM PHOSPHATE 4 MG/ML
INJECTION, SOLUTION INTRA-ARTICULAR; INTRALESIONAL; INTRAMUSCULAR; INTRAVENOUS; SOFT TISSUE
Status: DISPENSED
Start: 2017-03-31

## (undated) RX ORDER — GLYCOPYRROLATE 0.2 MG/ML
INJECTION INTRAMUSCULAR; INTRAVENOUS
Status: DISPENSED
Start: 2017-03-31

## (undated) RX ORDER — FENTANYL CITRATE 50 UG/ML
INJECTION, SOLUTION INTRAMUSCULAR; INTRAVENOUS
Status: DISPENSED
Start: 2019-01-13

## (undated) RX ORDER — OXYTOCIN/0.9 % SODIUM CHLORIDE 30/500 ML
PLASTIC BAG, INJECTION (ML) INTRAVENOUS
Status: DISPENSED
Start: 2019-12-16

## (undated) RX ORDER — LIDOCAINE HYDROCHLORIDE 10 MG/ML
INJECTION, SOLUTION EPIDURAL; INFILTRATION; INTRACAUDAL; PERINEURAL
Status: DISPENSED
Start: 2017-03-31

## (undated) RX ORDER — DOXYCYCLINE 100 MG/10ML
INJECTION, POWDER, LYOPHILIZED, FOR SOLUTION INTRAVENOUS
Status: DISPENSED
Start: 2017-03-31

## (undated) RX ORDER — FENTANYL CITRATE 50 UG/ML
INJECTION, SOLUTION INTRAMUSCULAR; INTRAVENOUS
Status: DISPENSED
Start: 2017-03-31

## (undated) RX ORDER — MORPHINE SULFATE 1 MG/ML
INJECTION, SOLUTION EPIDURAL; INTRATHECAL; INTRAVENOUS
Status: DISPENSED
Start: 2019-12-16

## (undated) RX ORDER — DEXAMETHASONE SODIUM PHOSPHATE 4 MG/ML
INJECTION, SOLUTION INTRA-ARTICULAR; INTRALESIONAL; INTRAMUSCULAR; INTRAVENOUS; SOFT TISSUE
Status: DISPENSED
Start: 2019-12-16

## (undated) RX ORDER — KETOROLAC TROMETHAMINE 30 MG/ML
INJECTION, SOLUTION INTRAMUSCULAR; INTRAVENOUS
Status: DISPENSED
Start: 2019-12-16

## (undated) RX ORDER — PHENYLEPHRINE HCL IN 0.9% NACL 1 MG/10 ML
SYRINGE (ML) INTRAVENOUS
Status: DISPENSED
Start: 2019-12-16

## (undated) RX ORDER — ACETAMINOPHEN 500 MG
TABLET ORAL
Status: DISPENSED
Start: 2017-03-31

## (undated) RX ORDER — PROPOFOL 10 MG/ML
INJECTION, EMULSION INTRAVENOUS
Status: DISPENSED
Start: 2017-03-31

## (undated) RX ORDER — EPHEDRINE SULFATE 50 MG/ML
INJECTION, SOLUTION INTRAMUSCULAR; INTRAVENOUS; SUBCUTANEOUS
Status: DISPENSED
Start: 2019-12-16

## (undated) RX ORDER — DIPHENHYDRAMINE HYDROCHLORIDE 50 MG/ML
INJECTION INTRAMUSCULAR; INTRAVENOUS
Status: DISPENSED
Start: 2019-01-13

## (undated) RX ORDER — ONDANSETRON 2 MG/ML
INJECTION INTRAMUSCULAR; INTRAVENOUS
Status: DISPENSED
Start: 2019-12-16

## (undated) RX ORDER — ONDANSETRON 2 MG/ML
INJECTION INTRAMUSCULAR; INTRAVENOUS
Status: DISPENSED
Start: 2017-03-31